# Patient Record
Sex: FEMALE | Race: BLACK OR AFRICAN AMERICAN | NOT HISPANIC OR LATINO | Employment: FULL TIME | URBAN - METROPOLITAN AREA
[De-identification: names, ages, dates, MRNs, and addresses within clinical notes are randomized per-mention and may not be internally consistent; named-entity substitution may affect disease eponyms.]

---

## 2017-05-09 ENCOUNTER — ALLSCRIPTS OFFICE VISIT (OUTPATIENT)
Dept: OTHER | Facility: OTHER | Age: 37
End: 2017-05-09

## 2017-08-31 ENCOUNTER — HOSPITAL ENCOUNTER (EMERGENCY)
Facility: HOSPITAL | Age: 37
Discharge: HOME | End: 2017-08-31
Attending: EMERGENCY MEDICINE | Admitting: EMERGENCY MEDICINE
Payer: COMMERCIAL

## 2017-08-31 VITALS
HEART RATE: 85 BPM | RESPIRATION RATE: 18 BRPM | TEMPERATURE: 99.3 F | SYSTOLIC BLOOD PRESSURE: 118 MMHG | DIASTOLIC BLOOD PRESSURE: 65 MMHG | OXYGEN SATURATION: 100 %

## 2017-08-31 DIAGNOSIS — M54.41 ACUTE RIGHT-SIDED LOW BACK PAIN WITH RIGHT-SIDED SCIATICA: Primary | ICD-10-CM

## 2017-08-31 PROCEDURE — 99283 EMERGENCY DEPT VISIT LOW MDM: CPT

## 2017-08-31 RX ORDER — CYCLOBENZAPRINE HCL 10 MG
10 TABLET ORAL 3 TIMES DAILY PRN
Qty: 20 TABLET | Refills: 0 | Status: SHIPPED | OUTPATIENT
Start: 2017-08-31 | End: 2019-03-15 | Stop reason: ALTCHOICE

## 2017-08-31 RX ORDER — NAPROXEN 500 MG/1
500 TABLET ORAL 2 TIMES DAILY PRN
Qty: 20 TABLET | Refills: 0 | Status: SHIPPED | OUTPATIENT
Start: 2017-08-31 | End: 2019-03-15 | Stop reason: ALTCHOICE

## 2017-09-11 DIAGNOSIS — M54.41 LOW BACK PAIN WITH RIGHT-SIDED SCIATICA: ICD-10-CM

## 2017-09-11 DIAGNOSIS — M54.50 LOW BACK PAIN: ICD-10-CM

## 2017-09-21 ENCOUNTER — ALLSCRIPTS OFFICE VISIT (OUTPATIENT)
Dept: OTHER | Facility: OTHER | Age: 37
End: 2017-09-21

## 2017-10-20 ENCOUNTER — GENERIC CONVERSION - ENCOUNTER (OUTPATIENT)
Dept: OTHER | Facility: OTHER | Age: 37
End: 2017-10-20

## 2018-01-12 NOTE — RESULT NOTES
Message   Stress fracture in her foot based on the MRI   Has she scheduled with the podiatrist?        Verified Results  * MRI FOOT/FOREFOOT TOES LEFT WO CONTRAST [id number] 08:49PM Melissa Smith Order Number: [id number]     Test Name Result Flag Reference   MRI FOOT/FOREFOOT TOES LEFT WO CONTRAST (Report)     This is a summary report  The complete report is available in the patient's medical record  If you cannot access the medical record, please contact the sending organization for a detailed fax or copy  MRI LEFT FOOT     INDICATION: Left foot swelling, generalized left foot pain localized over the lateral dorsal aspect  COMPARISON: Left foot plain films from 1/4/2016  TECHNIQUE: MR sequences were obtained of the left foot including the following: Localizer, sagittal T1/STIR, coronal T1/T2 fat sat, axial T2 fat sat/PD  Images were acquired on a 1 5 Sana unit  Gadolinium was not used     FINDINGS:     SUBCUTANEOUS TISSUES: Normal     BONE MARROW: There is marrow edema along the plantar aspect of the metatarsal head and neck which is probably due to a stress fracture (series 5 images 10 through 12, and series 4 images 10 through 12 )     FIRST MTP JOINT: Intact  SESAMOID BONES: Intact  PLANTAR FASCIA: Intact  LISFRANC LIGAMENT: Intact  FOREFOOT TENDONS: Intact  INTERMETATARSAL REGIONS: There is mild 1st and 2nd interspace intermetatarsal bursitis (series 7 image 18 ) There is no evidence of Campo's neuroma  MUSCULATURE: Intact         IMPRESSION:     There is marrow edema along the plantar aspect of the metatarsal head and neck which is probably due to a stress fracture (series 5 images 10 through 12, and series 4 images 10 through 12 )     There is mild 1st and 2nd interspace intermetatarsal bursitis (series 7 image 18 )       ##sigslh##sigslh       Workstation performed: [id number]     Signed by:   Zaid Jin MD   3/31/16       Signatures   Electronically signed by BERNARDO Miles ; Mar 31 2016  2:19PM EST                       (Author)

## 2018-01-13 VITALS
SYSTOLIC BLOOD PRESSURE: 122 MMHG | HEIGHT: 68 IN | BODY MASS INDEX: 32.89 KG/M2 | HEART RATE: 100 BPM | WEIGHT: 217 LBS | OXYGEN SATURATION: 98 % | DIASTOLIC BLOOD PRESSURE: 80 MMHG

## 2018-01-14 VITALS
OXYGEN SATURATION: 99 % | HEART RATE: 76 BPM | DIASTOLIC BLOOD PRESSURE: 74 MMHG | WEIGHT: 218.2 LBS | HEIGHT: 68 IN | SYSTOLIC BLOOD PRESSURE: 112 MMHG | BODY MASS INDEX: 33.07 KG/M2 | TEMPERATURE: 99.1 F

## 2018-01-14 NOTE — MISCELLANEOUS
Provider Comments  Provider Comments:   Pt was a n/s  LMOM to call back and make new appt        Signatures   Electronically signed by : BERNARDO Cassidy ; Nov 14 2016  4:41PM EST                       (Review)

## 2018-01-16 NOTE — MISCELLANEOUS
Provider Comments  Provider Comments: The patient did not show up for her appointment today  A message was left for her to reschedule        Signatures   Electronically signed by : Murlene Boas, M D ; Apr 8 2016 12:50PM EST                       (Author)

## 2018-01-16 NOTE — PROGRESS NOTES
Assessment    1  Encounter for preventive health examination (V70 0) (Z00 00)   2  Current smoker (305 1) (F17 200)   3  Obesity (BMI 30-39 9) (278 00) (E66 9)   4  Screening, lipid (V77 91) (Z13 220)   5  Screening for diabetes mellitus (V77 1) (Z13 1)   6  Need for prophylactic vaccination or inoculation against diphtheria and tetanus (V06 5)   (Z23)   7  Bipolar disorder (296 80) (F31 9)   8  Bilateral edema of lower extremity (782 3) (R60 0)   9  Cough (786 2) (R05)    Plan  Bilateral edema of lower extremity, Obesity (BMI 30-39 9), Screening for diabetes  mellitus, Screening, lipid    · (1) TSH WITH FT4 REFLEX; Status:Active; Requested for:61Ady2960;   Bipolar disorder    · QUEtiapine Fumarate 25 MG Oral Tablet (SEROquel); TAKE 1 TABLET qhs  Need for prophylactic vaccination or inoculation against diphtheria and tetanus    · Adacel 5-2-15 5 LF-MCG/0 5 Intramuscular Suspension; INJECT 0 5  ML  Intramuscular; To Be Done: 57ZVF0391  Obesity (BMI 30-39 9), Screening for diabetes mellitus, Screening, lipid    · (1) GLUCOSE,  FASTING; Status:Active; Requested for:44Rcf1451;    · (1) LIPID PANEL FASTING W DIRECT LDL REFLEX; Status:Active; Requested  BMU:[id number];     Discussion/Summary  health maintenance visit Currently, she eats a poor diet and has an inadequate exercise regimen  cervical cancer screening is current Breast cancer screening: breast cancer screening is not indicated  Colorectal cancer screening: colorectal cancer screening is not indicated  Osteoporosis screening: bone mineral density testing is not indicated  Screening lab work includes glucose, lipid profile and thyroid function testing  The immunizations will be given as outlined in the orders and Declines flu vaccine  Advice and education were given regarding nutrition, aerobic exercise, weight loss and tobacco cessation  Start Seroquel  3-4 week f/u  Form completed for her  If cough does not get better, will CXR     Possible side effects of new medications were reviewed with the patient/guardian today  The patient was counseled regarding instructions for management, impressions  total time of encounter was 30 minutes and >15 minutes was spent counseling  Chief Complaint  Patient presents today for a wellness  History of Present Illness  HM, Adult Female: The patient is being seen for a health maintenance and need form completed to have foster child evaluation  The last health maintenance visit was 1 year(s) ago  Social History: Household members include foster child, 3 biological children  Work status: working full time  General Health: The patient's health since the last visit is described as good  She has regular dental visits  She complains of vision problems  Vision care includes no recent eye examination  The patient complains of worsening vision  She denies hearing loss  Immunizations status: not up to date The patient needs the following immunization(s): tetanus vaccine and influenza vaccine  Lifestyle:  She does not have a healthy diet  She has weight concerns  She does not exercise regularly  She uses tobacco  The patient is a current cigarette smoker and >20 pack years  She is ready to quit using tobacco  She consumes alcohol  She reports occasional alcohol use  She denies drug use  Reproductive health: the patient is premenopausal   pregnancy history: G 6P 3, 1, 2, 4  Screening: Cervical cancer screening includes a pap smear performed 2016  Breast cancer screening includes no previous mammogram  She hasn't been previously screened for colorectal cancer  Metabolic screening includes uncertain timing of her last lipid profile, glucose screening performed 7468 and uncertain timing of her last thyroid function test      Cardiovascular risk factors: stress, obesity and tobacco use, but no hypertension and no diabetes        Review of Systems    Constitutional: no fever, no recent weight gain, no chills and no recent weight loss    Eyes: eyesight problems  ENT: nasal discharge, but no sore throat  Cardiovascular: no chest pain, no palpitations and no lower extremity edema  The patient presents with complaints of constant episodes of cough, described as dry (3 weeks, started when she had strep throat)  Gastrointestinal: no abdominal pain, no constipation and no diarrhea  Genitourinary: no dysuria  Musculoskeletal: left foot pain at times, known stress fractures and used the boot for a while  Integumentary: no rashes  Neurological: no headache  Psychiatric: sleep disturbances, emotional problems and racing thoughts, mood swings but did not try Seroquel again which she did well on with Lexapro in the past, but not suicidal and no depression  Active Problems    1  Bilateral edema of lower extremity (782 3) (R60 0)   2  Bipolar disorder (296 80) (F31 9)   3  Cervical high risk HPV (human papillomavirus) test positive (795 05) (R87 810)   4  Generalized abdominal pain (789 07) (R10 84)   5  Hemorrhagic cyst of ovary (620 2) (N83 20)   6  Hemorrhoids, external (455 3) (K64 4)   7  Need for prophylactic vaccination and inoculation against influenza (V04 81) (Z23)   8   Pedal edema (782 3) (R60 0)    Past Medical History    · History of Acute foot pain, left (729 5) (M79 672)   · Acute laryngitis (464 00) (J04 0)   · Acute upper respiratory infection (465 9) (J06 9)   · History of Cellulitis (682 9) (L03 90)   · History of Cough (786 2) (R05)   · History of Coughing up blood (786 30) (R04 2)   · History of acute bronchitis (V12 69) (Z87 09)   · History of acute gastritis (V12 70) (Z87 19)   · History of acute pharyngitis (V12 69) (Z87 09)   · History of backache (V13 59) (Z87 39)   · History of left flank pain (V13 89) (T70 555)   · History of sciatica (V12 49) (Z86 69)   · History of streptococcal pharyngitis (V12 09) (Z87 09)   · History of urinary tract infection (V13 02) (Z87 440)   · History of Neck pain (723 1) (M54 2)    Surgical History    · History of Appendectomy   · History of Tubal Ligation    Family History  Family History    · Family history of Depression (311) (F32 9)   · Family history of Diabetes Mellitus (250 00)   · Family history of hypertension (V17 49) (Z82 49)   · Family history of Schizophrenia    Social History    · Being A Social Drinker   · Current smoker (305 1) (F17 200)    Allergies    1  No Known Drug Allergies    2  Seasonal    Vitals   Recorded: 58MHD9924 75:57OH   Systolic 592   Diastolic 88   Heart Rate 218   Respiration 16   Temperature 97 8 F   O2 Saturation 99   Height 5 ft 7 5 in   Weight 219 lb 0 8 oz   BMI Calculated 33 8   BSA Calculated 2 11     Physical Exam    Constitutional   General appearance: No acute distress, well appearing and well nourished  obese  Head and Face   Head and face: Normal     Eyes   Conjunctiva and lids: No swelling, erythema or discharge  Ears, Nose, Mouth, and Throat   Otoscopic examination: Tympanic membranes translucent with normal light reflex  Canals patent without erythema  Oropharynx: Normal with no erythema, edema, exudate or lesions  Neck   Neck: Supple, symmetric, trachea midline, no masses  Thyroid: Normal, no thyromegaly  Pulmonary   Respiratory effort: No increased work of breathing or signs of respiratory distress  Auscultation of lungs: Clear to auscultation  Cardiovascular   Auscultation of heart: Normal rate and rhythm, normal S1 and S2, no murmurs  Examination of extremities for edema and/or varicosities: Abnormal   bilateral ankle tr+ pitting edema and bilateral pretibial tr+ pitting edema, but pitting edema present  Abdomen   Abdomen: Non-tender, no masses  Lymphatic   Palpation of lymph nodes in neck: No lymphadenopathy      Musculoskeletal   Gait and station: Normal     Psychiatric   Orientation to person, place, and time: Normal     Mood and affect: Normal        Signatures   Electronically signed by : Doug Hunt BERNARDO Powell ; Sep 23 2016  9:18AM EST                       (Author)

## 2018-01-17 NOTE — MISCELLANEOUS
Provider Comments  Provider Comments:   Pt was a NOS  LMOM to call back and make a new appt        Signatures   Electronically signed by : BERNARDO Chakraborty ; Oct 27 2017 10:13AM EST                       (Review)

## 2018-06-01 ENCOUNTER — APPOINTMENT (EMERGENCY)
Dept: CT IMAGING | Facility: HOSPITAL | Age: 38
End: 2018-06-01
Payer: COMMERCIAL

## 2018-06-01 ENCOUNTER — HOSPITAL ENCOUNTER (EMERGENCY)
Facility: HOSPITAL | Age: 38
Discharge: HOME | End: 2018-06-01
Attending: EMERGENCY MEDICINE | Admitting: EMERGENCY MEDICINE
Payer: COMMERCIAL

## 2018-06-01 ENCOUNTER — TELEPHONE (OUTPATIENT)
Dept: INTERNAL MEDICINE CLINIC | Facility: CLINIC | Age: 38
End: 2018-06-01

## 2018-06-01 VITALS
RESPIRATION RATE: 20 BRPM | HEART RATE: 64 BPM | OXYGEN SATURATION: 100 % | DIASTOLIC BLOOD PRESSURE: 61 MMHG | BODY MASS INDEX: 33.58 KG/M2 | TEMPERATURE: 98.4 F | WEIGHT: 217.59 LBS | SYSTOLIC BLOOD PRESSURE: 117 MMHG

## 2018-06-01 DIAGNOSIS — R51.9 HEADACHE: Primary | ICD-10-CM

## 2018-06-01 PROCEDURE — 99284 EMERGENCY DEPT VISIT MOD MDM: CPT

## 2018-06-01 PROCEDURE — 96365 THER/PROPH/DIAG IV INF INIT: CPT

## 2018-06-01 PROCEDURE — 96375 TX/PRO/DX INJ NEW DRUG ADDON: CPT

## 2018-06-01 PROCEDURE — 70450 CT HEAD/BRAIN W/O DYE: CPT

## 2018-06-01 RX ORDER — METOCLOPRAMIDE HYDROCHLORIDE 5 MG/ML
10 INJECTION INTRAMUSCULAR; INTRAVENOUS ONCE
Status: COMPLETED | OUTPATIENT
Start: 2018-06-01 | End: 2018-06-01

## 2018-06-01 RX ORDER — DIPHENHYDRAMINE HYDROCHLORIDE 50 MG/ML
25 INJECTION INTRAMUSCULAR; INTRAVENOUS EVERY 6 HOURS PRN
Status: DISCONTINUED | OUTPATIENT
Start: 2018-06-01 | End: 2018-06-01 | Stop reason: HOSPADM

## 2018-06-01 RX ORDER — KETOROLAC TROMETHAMINE 30 MG/ML
15 INJECTION, SOLUTION INTRAMUSCULAR; INTRAVENOUS ONCE
Status: COMPLETED | OUTPATIENT
Start: 2018-06-01 | End: 2018-06-01

## 2018-06-01 RX ORDER — ACETAMINOPHEN 325 MG/1
975 TABLET ORAL ONCE
Status: COMPLETED | OUTPATIENT
Start: 2018-06-01 | End: 2018-06-01

## 2018-06-01 RX ADMIN — KETOROLAC TROMETHAMINE 15 MG: 30 INJECTION, SOLUTION INTRAMUSCULAR at 16:25

## 2018-06-01 RX ADMIN — VALPROATE SODIUM 1000 MG: 100 INJECTION, SOLUTION INTRAVENOUS at 15:44

## 2018-06-01 RX ADMIN — ACETAMINOPHEN 975 MG: 325 TABLET ORAL at 15:33

## 2018-06-01 RX ADMIN — DIPHENHYDRAMINE HYDROCHLORIDE 25 MG: 50 INJECTION, SOLUTION INTRAMUSCULAR; INTRAVENOUS at 15:35

## 2018-06-01 RX ADMIN — METOCLOPRAMIDE 10 MG: 5 INJECTION, SOLUTION INTRAMUSCULAR; INTRAVENOUS at 15:37

## 2018-06-01 NOTE — ED PROVIDER NOTES
History  Chief Complaint   Patient presents with    Headache     Pt  c/o headache since this morning with dizziness  Pt  states "it feels like my whole head was bashed in with a rock"  Pt  denies hx  of migraines or hypertension  HPI     Patient is a pleasant 42 yof with achi, diffuse headache that started this am  No ataxia or dizziness -  She notes mild lightheadedness with no cp or sob  HA was gradual onset  No f/c/s  No neck stiffness  No focal neurological symptoms  No temporal artery pain/tenderness  No vision changes  Headaches are not increasing in severity or frequency  Not worse in the AM  No head trauma  No dysarthria, nystagmus, dysphagia, diplopia, aphasia, vertigo, ataxia, visions loss, dysmetria  Normal finger to nose, gait, rapid alternating movement,  tandem gait, strength and sensation in bilat upper and lower extremities  Normal upper and lower reflexes  No pronator drift  Normal heel to shin  EOMI, no visual field defects  CN 2-13 intact  GCS 15  AOx3  Normal babinski  MDM well appearing 42, yof, will treat for headache  Prior to Admission Medications   Prescriptions Last Dose Informant Patient Reported? Taking? cyclobenzaprine (FLEXERIL) 10 mg tablet   No No   Sig: Take 1 tablet by mouth 3 (three) times a day as needed for muscle spasms   guaifenesin-codeine (GUAIFENESIN AC) 100-10 MG/5ML liquid   No No   Sig: Take 5 mL by mouth 3 (three) times a day as needed for cough  naproxen (NAPROSYN) 500 mg tablet   No No   Sig: Take 1 tablet by mouth 2 (two) times a day as needed for mild pain      Facility-Administered Medications: None       Past Medical History:   Diagnosis Date    Ovarian cyst        Past Surgical History:   Procedure Laterality Date    APPENDECTOMY      TUBAL LIGATION         History reviewed  No pertinent family history  I have reviewed and agree with the history as documented      Social History   Substance Use Topics    Smoking status: Current Every Day Smoker     Packs/day: 1 00     Types: Cigarettes    Smokeless tobacco: Not on file    Alcohol use Yes      Comment: occasional        Review of Systems   Neurological: Positive for headaches  All other systems reviewed and are negative  Physical Exam  Physical Exam   Constitutional: She is oriented to person, place, and time  She appears well-developed and well-nourished  HENT:   Head: Normocephalic and atraumatic  Right Ear: External ear normal    Left Ear: External ear normal    Eyes: Conjunctivae and EOM are normal    Neck: Normal range of motion  Neck supple  No JVD present  No tracheal deviation present  Cardiovascular: Normal rate, regular rhythm and normal heart sounds  Pulmonary/Chest: Effort normal  No respiratory distress  She has no wheezes  She has no rales  Abdominal: Soft  Bowel sounds are normal  There is no tenderness  There is no rebound and no guarding  Musculoskeletal: She exhibits no edema or tenderness  Neurological: She is alert and oriented to person, place, and time  Skin: Skin is warm and dry  No rash noted  No erythema  Psychiatric: She has a normal mood and affect  Thought content normal    Nursing note and vitals reviewed        Vital Signs  ED Triage Vitals   Temperature Pulse Respirations Blood Pressure SpO2   06/01/18 1501 06/01/18 1457 06/01/18 1457 06/01/18 1501 06/01/18 1457   98 4 °F (36 9 °C) 72 20 151/77 100 %      Temp Source Heart Rate Source Patient Position - Orthostatic VS BP Location FiO2 (%)   06/01/18 1501 -- -- -- --   Oral          Pain Score       06/01/18 1457       Worst Possible Pain           Vitals:    06/01/18 1457 06/01/18 1501 06/01/18 1630   BP:  151/77 117/61   Pulse: 72  64       Visual Acuity      ED Medications  Medications   valproate (DEPACON) 1,000 mg in sodium chloride 0 9 % 50 mL for headache (0 g Intravenous Stopped 6/1/18 1604)   metoclopramide (REGLAN) injection 10 mg (10 mg Intravenous Given 6/1/18 1537) acetaminophen (TYLENOL) tablet 975 mg (975 mg Oral Given 6/1/18 1533)   ketorolac (TORADOL) injection 15 mg (15 mg Intravenous Given 6/1/18 1625)       Diagnostic Studies  Results Reviewed     None                 CT head wo contrast   Final Result by Bita Lopez MD (06/01 1547)      No acute intracranial abnormality  Workstation performed: [id number]                    Procedures  Procedures       Phone Contacts  ED Phone Contact    ED Course                               MDM  CritCare Time    Disposition  Final diagnoses:   Headache     Time reflects when diagnosis was documented in both MDM as applicable and the Disposition within this note     Time User Action Codes Description Comment    6/1/2018  4:30 PM Ramona Deleon, [address] [R51] Headache       ED Disposition     ED Disposition Condition Comment    Discharge  Savanna Dennis discharge to home/self care  Condition at discharge: Good        Follow-up Information     Follow up With Specialties Details Why Gabbi Glass MD Internal Medicine In 1 day  [address]   [phone]            Discharge Medication List as of 6/1/2018  4:33 PM      CONTINUE these medications which have NOT CHANGED    Details   cyclobenzaprine (FLEXERIL) 10 mg tablet Take 1 tablet by mouth 3 (three) times a day as needed for muscle spasms, Starting Thu 8/31/2017, Print      guaifenesin-codeine (GUAIFENESIN AC) 100-10 MG/5ML liquid Take 5 mL by mouth 3 (three) times a day as needed for cough  , Starting 6/5/2016, Until Discontinued, Print      naproxen (NAPROSYN) 500 mg tablet Take 1 tablet by mouth 2 (two) times a day as needed for mild pain, Starting Thu 8/31/2017, Print           No discharge procedures on file      ED Provider  Electronically Signed by           Nidia Ingram MD  06/02/18 7891

## 2018-06-01 NOTE — DISCHARGE INSTRUCTIONS
Acute Headache   WHAT YOU NEED TO KNOW:   An acute headache is pain or discomfort that starts suddenly and gets worse quickly  You may have an acute headache only when you feel stress or eat certain foods  Other acute headache pain can happen every day, and sometimes several times a day  DISCHARGE INSTRUCTIONS:   Return to the emergency department if:   · You have severe pain  · You have numbness or weakness on one side of your face or body  · You have a headache that occurs after a blow to the head, a fall, or other trauma  · You have a headache, are forgetful or confused, or have trouble speaking  · You have a headache, stiff neck, and a fever  Contact your healthcare provider if:   · You have a constant headache and are vomiting  · You have a headache each day that does not get better, even after treatment  · You have changes in your headaches, or new symptoms that occur when you have a headache  · You have questions or concerns about your condition or care  Medicines: You may need any of the following:  · Prescription pain medicine  may be given  The medicine your healthcare provider recommends will depend on the kind of headaches you have  You will need to take prescription headache medicines as directed to prevent a problem called rebound headache  These headaches happen with regular use of pain relievers for headache disorders  · NSAIDs , such as ibuprofen, help decrease swelling, pain, and fever  This medicine is available with or without a doctor's order  NSAIDs can cause stomach bleeding or kidney problems in certain people  If you take blood thinner medicine, always ask your healthcare provider if NSAIDs are safe for you  Always read the medicine label and follow directions  · Acetaminophen  decreases pain and fever  It is available without a doctor's order  Ask how much to take and how often to take it  Follow directions   Read the labels of all other medicines you are using to see if they also contain acetaminophen, or ask your doctor or pharmacist  Acetaminophen can cause liver damage if not taken correctly  Do not use more than 3 grams (3,000 milligrams) total of acetaminophen in one day  · Antidepressants  may be given for some kinds of headaches  · Take your medicine as directed  Contact your healthcare provider if you think your medicine is not helping or if you have side effects  Tell him or her if you are allergic to any medicine  Keep a list of the medicines, vitamins, and herbs you take  Include the amounts, and when and why you take them  Bring the list or the pill bottles to follow-up visits  Carry your medicine list with you in case of an emergency  Manage your symptoms:   · Apply heat or ice  on the headache area  Use a heat or ice pack  For an ice pack, you can also put crushed ice in a plastic bag  Cover the pack or bag with a towel before you apply it to your skin  Ice and heat both help decrease pain, and heat also helps decrease muscle spasms  Apply heat for 20 to 30 minutes every 2 hours  Apply ice for 15 to 20 minutes every hour  Apply heat or ice for as long and for as many days as directed  You may alternate heat and ice  · Relax your muscles  Lie down in a comfortable position and close your eyes  Relax your muscles slowly  Start at your toes and work your way up your body  · Keep a record of your headaches  Write down when your headaches start and stop  Include your symptoms and what you were doing when the headache began  Record what you ate or drank for 24 hours before the headache started  Describe the pain and where it hurts  Keep track of what you did to treat your headache and if it worked  Prevent an acute headache:   · Avoid anything that triggers an acute headache  Examples include exposure to chemicals, going to high altitude, or not getting enough sleep  Create a regular sleep routine   Go to sleep at the same time and wake up at the same time each day  Do not use electronic devices before bedtime  These may trigger a headache or prevent you from sleeping well  · Do not smoke  Nicotine and other chemicals in cigarettes and cigars can trigger an acute headache or make it worse  Ask your healthcare provider for information if you currently smoke and need help to quit  E-cigarettes or smokeless tobacco still contain nicotine  Talk to your healthcare provider before you use these products  · Limit alcohol as directed  Alcohol can trigger an acute headache or make it worse  If you have cluster headaches, do not drink alcohol during an episode  For other types of headaches, ask your healthcare provider if it is safe for you to drink alcohol  Ask how much is safe for you to drink, and how often  · Exercise as directed  Exercise can reduce tension and help with headache pain  Aim for 30 minutes of physical activity on most days of the week  Your healthcare provider can help you create an exercise plan  · Eat a variety of healthy foods  Healthy foods include fruits, vegetables, low-fat dairy products, lean meats, fish, whole grains, and cooked beans  Your healthcare provider or dietitian can help you create meals plans if you need to avoid foods that trigger headaches  Follow up with your healthcare provider as directed:  Bring your headache record with you when you see your healthcare provider  Write down your questions so you remember to ask them during your visits  © 2017 [facility] Information is for End User's use only and may not be sold, redistributed or otherwise used for commercial purposes  All illustrations and images included in CareNotes® are the copyrighted property of A D [employer] M [employer] Inc  or Gage [employer]  The above information is an  only  It is not intended as medical advice for individual conditions or treatments   Talk to your doctor, nurse or pharmacist before following any medical regimen to see if it is safe and effective for you

## 2018-06-01 NOTE — ED NOTES
Pt  Ambulated out of dept , vss, normal gait, no acute distress       Bob Kulkarni RN  06/01/18 9895

## 2018-08-23 ENCOUNTER — HOSPITAL ENCOUNTER (EMERGENCY)
Facility: HOSPITAL | Age: 38
Discharge: HOME | End: 2018-08-23
Attending: EMERGENCY MEDICINE | Admitting: EMERGENCY MEDICINE
Payer: COMMERCIAL

## 2018-08-23 ENCOUNTER — APPOINTMENT (EMERGENCY)
Dept: CT IMAGING | Facility: HOSPITAL | Age: 38
End: 2018-08-23
Payer: COMMERCIAL

## 2018-08-23 VITALS
BODY MASS INDEX: 34.22 KG/M2 | HEART RATE: 62 BPM | RESPIRATION RATE: 16 BRPM | DIASTOLIC BLOOD PRESSURE: 81 MMHG | SYSTOLIC BLOOD PRESSURE: 132 MMHG | OXYGEN SATURATION: 100 % | WEIGHT: 221.78 LBS | TEMPERATURE: 97.9 F

## 2018-08-23 DIAGNOSIS — R10.9 ABDOMINAL PAIN: Primary | ICD-10-CM

## 2018-08-23 LAB
ALBUMIN SERPL BCP-MCNC: 3.4 G/DL (ref 3.5–5)
ALP SERPL-CCNC: 113 U/L (ref 46–116)
ALT SERPL W P-5'-P-CCNC: 29 U/L (ref 12–78)
ANION GAP SERPL CALCULATED.3IONS-SCNC: 3 MMOL/L (ref 4–13)
AST SERPL W P-5'-P-CCNC: 12 U/L (ref 5–45)
BASOPHILS # BLD AUTO: 0.01 THOUSANDS/ΜL (ref 0–0.1)
BASOPHILS NFR BLD AUTO: 0 % (ref 0–1)
BILIRUB SERPL-MCNC: 0.3 MG/DL (ref 0.2–1)
BILIRUB UR QL STRIP: NEGATIVE
BUN SERPL-MCNC: 7 MG/DL (ref 5–25)
CALCIUM SERPL-MCNC: 10 MG/DL (ref 8.3–10.1)
CHLORIDE SERPL-SCNC: 109 MMOL/L (ref 100–108)
CLARITY UR: CLEAR
CO2 SERPL-SCNC: 29 MMOL/L (ref 21–32)
COLOR UR: YELLOW
CREAT SERPL-MCNC: 0.66 MG/DL (ref 0.6–1.3)
EOSINOPHIL # BLD AUTO: 0.13 THOUSAND/ΜL (ref 0–0.61)
EOSINOPHIL NFR BLD AUTO: 3 % (ref 0–6)
ERYTHROCYTE [DISTWIDTH] IN BLOOD BY AUTOMATED COUNT: 15.6 % (ref 11.6–15.1)
EXT PREG TEST URINE: NEGATIVE
GFR SERPL CREATININE-BSD FRML MDRD: 128 ML/MIN/1.73SQ M
GLUCOSE SERPL-MCNC: 91 MG/DL (ref 65–140)
GLUCOSE UR STRIP-MCNC: NEGATIVE MG/DL
HCT VFR BLD AUTO: 39.4 % (ref 34.8–46.1)
HGB BLD-MCNC: 12.8 G/DL (ref 11.5–15.4)
HGB UR QL STRIP.AUTO: NEGATIVE
IMM GRANULOCYTES # BLD AUTO: 0.01 THOUSAND/UL (ref 0–0.2)
IMM GRANULOCYTES NFR BLD AUTO: 0 % (ref 0–2)
KETONES UR STRIP-MCNC: NEGATIVE MG/DL
LEUKOCYTE ESTERASE UR QL STRIP: NEGATIVE
LIPASE SERPL-CCNC: 98 U/L (ref 73–393)
LYMPHOCYTES # BLD AUTO: 1.43 THOUSANDS/ΜL (ref 0.6–4.47)
LYMPHOCYTES NFR BLD AUTO: 34 % (ref 14–44)
MCH RBC QN AUTO: 26.7 PG (ref 26.8–34.3)
MCHC RBC AUTO-ENTMCNC: 32.5 G/DL (ref 31.4–37.4)
MCV RBC AUTO: 82 FL (ref 82–98)
MONOCYTES # BLD AUTO: 0.4 THOUSAND/ΜL (ref 0.17–1.22)
MONOCYTES NFR BLD AUTO: 10 % (ref 4–12)
NEUTROPHILS # BLD AUTO: 2.25 THOUSANDS/ΜL (ref 1.85–7.62)
NEUTS SEG NFR BLD AUTO: 53 % (ref 43–75)
NITRITE UR QL STRIP: NEGATIVE
NRBC BLD AUTO-RTO: 0 /100 WBCS
PH UR STRIP.AUTO: 7.5 [PH] (ref 4.5–8)
PLATELET # BLD AUTO: 168 THOUSANDS/UL (ref 149–390)
PMV BLD AUTO: 11.3 FL (ref 8.9–12.7)
POTASSIUM SERPL-SCNC: 3.7 MMOL/L (ref 3.5–5.3)
PROT SERPL-MCNC: 6.8 G/DL (ref 6.4–8.2)
PROT UR STRIP-MCNC: NEGATIVE MG/DL
RBC # BLD AUTO: 4.79 MILLION/UL (ref 3.81–5.12)
SODIUM SERPL-SCNC: 141 MMOL/L (ref 136–145)
SP GR UR STRIP.AUTO: 1.02 (ref 1–1.03)
UROBILINOGEN UR QL STRIP.AUTO: 1 E.U./DL
WBC # BLD AUTO: 4.23 THOUSAND/UL (ref 4.31–10.16)

## 2018-08-23 PROCEDURE — 81003 URINALYSIS AUTO W/O SCOPE: CPT

## 2018-08-23 PROCEDURE — 36415 COLL VENOUS BLD VENIPUNCTURE: CPT

## 2018-08-23 PROCEDURE — 80053 COMPREHEN METABOLIC PANEL: CPT | Performed by: EMERGENCY MEDICINE

## 2018-08-23 PROCEDURE — 81025 URINE PREGNANCY TEST: CPT | Performed by: EMERGENCY MEDICINE

## 2018-08-23 PROCEDURE — 85025 COMPLETE CBC W/AUTO DIFF WBC: CPT | Performed by: EMERGENCY MEDICINE

## 2018-08-23 PROCEDURE — 74177 CT ABD & PELVIS W/CONTRAST: CPT

## 2018-08-23 PROCEDURE — 96361 HYDRATE IV INFUSION ADD-ON: CPT

## 2018-08-23 PROCEDURE — 99284 EMERGENCY DEPT VISIT MOD MDM: CPT

## 2018-08-23 PROCEDURE — 96374 THER/PROPH/DIAG INJ IV PUSH: CPT

## 2018-08-23 PROCEDURE — 83690 ASSAY OF LIPASE: CPT | Performed by: EMERGENCY MEDICINE

## 2018-08-23 RX ORDER — KETOROLAC TROMETHAMINE 30 MG/ML
30 INJECTION, SOLUTION INTRAMUSCULAR; INTRAVENOUS ONCE
Status: COMPLETED | OUTPATIENT
Start: 2018-08-23 | End: 2018-08-23

## 2018-08-23 RX ORDER — ONDANSETRON 2 MG/ML
4 INJECTION INTRAMUSCULAR; INTRAVENOUS ONCE
Status: DISCONTINUED | OUTPATIENT
Start: 2018-08-23 | End: 2018-08-23 | Stop reason: HOSPADM

## 2018-08-23 RX ADMIN — KETOROLAC TROMETHAMINE 30 MG: 30 INJECTION, SOLUTION INTRAMUSCULAR at 14:23

## 2018-08-23 RX ADMIN — SODIUM CHLORIDE 1000 ML: 0.9 INJECTION, SOLUTION INTRAVENOUS at 13:40

## 2018-08-23 RX ADMIN — IOHEXOL 100 ML: 350 INJECTION, SOLUTION INTRAVENOUS at 14:31

## 2018-08-23 NOTE — DISCHARGE INSTRUCTIONS

## 2018-08-23 NOTE — ED PROVIDER NOTES
History  Chief Complaint   Patient presents with    Abdominal Pain     PT reports "Pain starting on left side three days ago, woke up this morning and now it is on the right side and has gotten worse " PT c/o N/V, denies fevers     Patient presents to the emergency department for evaluation of generalized abdominal pain  She states this began 3 days ago with left-sided pain and has since moved over to include the right  She had 1 episode of vomiting yesterday  No diarrhea  Patient denies trauma fall or injury  Patient denies back pain chest pain sob  Patient denies urinary symptoms or vaginal discharge or bleeding  She denies fever chills or rash  She does have a history of an appendectomy  Prior to Admission Medications   Prescriptions Last Dose Informant Patient Reported? Taking? cyclobenzaprine (FLEXERIL) 10 mg tablet   No No   Sig: Take 1 tablet by mouth 3 (three) times a day as needed for muscle spasms   guaifenesin-codeine (GUAIFENESIN AC) 100-10 MG/5ML liquid   No No   Sig: Take 5 mL by mouth 3 (three) times a day as needed for cough  naproxen (NAPROSYN) 500 mg tablet   No No   Sig: Take 1 tablet by mouth 2 (two) times a day as needed for mild pain      Facility-Administered Medications: None       Past Medical History:   Diagnosis Date    Ovarian cyst        Past Surgical History:   Procedure Laterality Date    APPENDECTOMY      TUBAL LIGATION         Family History   Problem Relation Age of Onset    Depression Family     Diabetes Family     Hypertension Family     Schizophrenia Family      I have reviewed and agree with the history as documented  Social History   Substance Use Topics    Smoking status: Current Every Day Smoker     Packs/day: 1 00     Types: Cigarettes    Smokeless tobacco: Never Used    Alcohol use Yes      Comment: occasional        Review of Systems   Constitutional: Negative    Negative for activity change, appetite change, chills, diaphoresis, fatigue and fever  HENT: Negative  Negative for congestion, sinus pain, sinus pressure, sore throat, trouble swallowing and voice change  Eyes: Negative  Negative for photophobia and visual disturbance  Respiratory: Negative  Negative for chest tightness and shortness of breath  Cardiovascular: Negative  Negative for chest pain, palpitations and leg swelling  Gastrointestinal: Positive for abdominal pain, nausea and vomiting  Negative for abdominal distention, anal bleeding, blood in stool, constipation, diarrhea and rectal pain  Endocrine: Negative  Genitourinary: Negative  Negative for difficulty urinating, dysuria, flank pain, frequency, hematuria, urgency, vaginal bleeding, vaginal discharge and vaginal pain  Musculoskeletal: Negative  Negative for back pain, gait problem, neck pain and neck stiffness  Skin: Negative  Negative for rash and wound  Allergic/Immunologic: Negative  Neurological: Negative  Negative for dizziness, tremors, seizures, syncope, facial asymmetry, speech difficulty, weakness, light-headedness, numbness and headaches  Hematological: Bruises/bleeds easily  Psychiatric/Behavioral: Negative  Negative for confusion, self-injury, sleep disturbance and suicidal ideas  Physical Exam  Physical Exam   Constitutional: She is oriented to person, place, and time  She appears well-developed and well-nourished  Nontoxic appearance  Respiratory distress  Patient appears comfortable sitting upright in the stretcher  HENT:   Head: Normocephalic and atraumatic  Right Ear: External ear normal    Left Ear: External ear normal    Mouth/Throat: Oropharynx is clear and moist    Eyes: Conjunctivae and EOM are normal  Pupils are equal, round, and reactive to light  Neck: Normal range of motion  Neck supple  Cardiovascular: Normal rate, regular rhythm, normal heart sounds and intact distal pulses      Pulmonary/Chest: Effort normal and breath sounds normal  No respiratory distress  She has no wheezes  She has no rales  She exhibits no tenderness  Abdominal: Soft  Bowel sounds are normal  She exhibits no distension and no mass  There is tenderness  There is no rebound and no guarding  No hernia  Mild generalized tenderness palpation  No peritoneal signs  No masses or hernias  Musculoskeletal: Normal range of motion  She exhibits no edema or deformity  Neurological: She is alert and oriented to person, place, and time  She has normal reflexes  She displays normal reflexes  No cranial nerve deficit or sensory deficit  She exhibits normal muscle tone  Coordination normal    Skin: Skin is warm and dry  No erythema  No pallor  Psychiatric: She has a normal mood and affect  Her behavior is normal  Judgment and thought content normal    Nursing note and vitals reviewed        Vital Signs  ED Triage Vitals   Temperature Pulse Respirations Blood Pressure SpO2   08/23/18 1222 08/23/18 1211 08/23/18 1211 08/23/18 1211 08/23/18 1211   97 9 °F (36 6 °C) 73 16 (!) 157/11 100 %      Temp Source Heart Rate Source Patient Position - Orthostatic VS BP Location FiO2 (%)   08/23/18 1222 08/23/18 1211 08/23/18 1211 08/23/18 1211 --   Oral Monitor Sitting Right arm       Pain Score       08/23/18 1423       8           Vitals:    08/23/18 1211 08/23/18 1430   BP: (!) 157/11 132/81   Pulse: 73 62   Patient Position - Orthostatic VS: Sitting        Visual Acuity      ED Medications  Medications   ondansetron (ZOFRAN) injection 4 mg (0 mg Intravenous Hold 8/23/18 1340)   sodium chloride 0 9 % bolus 1,000 mL (0 mL Intravenous Stopped 8/23/18 1529)   ketorolac (TORADOL) injection 30 mg (30 mg Intravenous Given 8/23/18 1423)   iohexol (OMNIPAQUE) 350 MG/ML injection (MULTI-DOSE) 100 mL (100 mL Intravenous Given 8/23/18 1431)       Diagnostic Studies  Results Reviewed     Procedure Component Value Units Date/Time    Comprehensive metabolic panel [[id number]]  (Abnormal) Collected:  08/23/18 1235    Lab Status:  Final result Specimen:  Blood from Arm, Right Updated:  08/23/18 1302     Sodium 141 mmol/L      Potassium 3 7 mmol/L      Chloride 109 (H) mmol/L      CO2 29 mmol/L      Anion Gap 3 (L) mmol/L      BUN 7 mg/dL      Creatinine 0 66 mg/dL      Glucose 91 mg/dL      Calcium 10 0 mg/dL      AST 12 U/L      ALT 29 U/L      Alkaline Phosphatase 113 U/L      Total Protein 6 8 g/dL      Albumin 3 4 (L) g/dL      Total Bilirubin 0 30 mg/dL      eGFR 128 ml/min/1 73sq m     Narrative:         National Kidney Disease Education Program recommendations are as follows:  GFR calculation is accurate only with a steady state creatinine  Chronic Kidney disease less than 60 ml/min/1 73 sq  meters  Kidney failure less than 15 ml/min/1 73 sq  meters      Lipase [[id number]]  (Normal) Collected:  08/23/18 1235    Lab Status:  Final result Specimen:  Blood from Arm, Right Updated:  08/23/18 1302     Lipase 98 u/L     POCT pregnancy, urine [[id number]]  (Normal) Resulted:  08/23/18 1253    Lab Status:  Final result Specimen:  Urine Updated:  08/23/18 1253     EXT PREG TEST UR (Ref: Negative) negative    ED Urine Macroscopic [[id number]] Collected:  08/23/18 1258    Lab Status:  Final result Specimen:  Urine Updated:  08/23/18 1248     Color, UA Yellow     Clarity, UA Clear     pH, UA 7 5     Leukocytes, UA Negative     Nitrite, UA Negative     Protein, UA Negative mg/dl      Glucose, UA Negative mg/dl      Ketones, UA Negative mg/dl      Urobilinogen, UA 1 0 E U /dl      Bilirubin, UA Negative     Blood, UA Negative     Specific Gravity, UA 1 020    Narrative:       CLINITEK RESULT    CBC and differential [[id number]]  (Abnormal) Collected:  08/23/18 1235    Lab Status:  Final result Specimen:  Blood from Arm, Right Updated:  08/23/18 1240     WBC 4 23 (L) Thousand/uL      RBC 4 79 Million/uL      Hemoglobin 12 8 g/dL      Hematocrit 39 4 %      MCV 82 fL      MCH 26 7 (L) pg      MCHC 32 5 g/dL      RDW 15 6 (H) %      MPV 11 3 fL      Platelets 098 Thousands/uL      nRBC 0 /100 WBCs      Neutrophils Relative 53 %      Immat GRANS % 0 %      Lymphocytes Relative 34 %      Monocytes Relative 10 %      Eosinophils Relative 3 %      Basophils Relative 0 %      Neutrophils Absolute 2 25 Thousands/µL      Immature Grans Absolute 0 01 Thousand/uL      Lymphocytes Absolute 1 43 Thousands/µL      Monocytes Absolute 0 40 Thousand/µL      Eosinophils Absolute 0 13 Thousand/µL      Basophils Absolute 0 01 Thousands/µL                  CT abdomen pelvis with contrast   Final Result by Estuardo Roque MD (08/23 1514)      No evidence of acute abnormality in the abdomen or pelvis  Workstation performed: [id number]                    Procedures  Procedures       Phone Contacts  ED Phone Contact    ED Course  ED Course as of Aug 23 1545   Thu Aug 23, 2018   1510 Patient feeling improved post Toradol  CT scan results and disposition pending  1529 Patient with negative CT scan and significant improvement after the Toradol  Discussed signs and symptoms that would require return to the emergency department  Patient comfortable at discharge                                MDM  CritCare Time    Disposition  Final diagnoses:   Abdominal pain     Time reflects when diagnosis was documented in both MDM as applicable and the Disposition within this note     Time User Action Codes Description Comment    8/23/2018  3:35 PM Inocencia Mirza 54 [R10 9] Abdominal pain       ED Disposition     ED Disposition Condition Comment    Discharge  [address] discharge to home/self care  Condition at discharge: Stable        Follow-up Information     Follow up With Specialties Details [facility] physician  Schedule an appointment as soon as possible for a visit            Patient's Medications   Discharge Prescriptions    No medications on file     No discharge procedures on file      ED Provider  Electronically Signed by           Ponce Martinez Lang Campbell MD  08/23/18 1975 Mili Ibarra MD  08/23/18 9107

## 2018-09-24 ENCOUNTER — HOSPITAL ENCOUNTER (EMERGENCY)
Facility: HOSPITAL | Age: 38
End: 2018-09-26
Attending: EMERGENCY MEDICINE | Admitting: EMERGENCY MEDICINE
Payer: COMMERCIAL

## 2018-09-24 DIAGNOSIS — F32.A DEPRESSION: Primary | ICD-10-CM

## 2018-09-24 DIAGNOSIS — R45.851 SUICIDAL THOUGHTS: ICD-10-CM

## 2018-09-24 LAB
AMPHETAMINES SERPL QL SCN: NEGATIVE
BARBITURATES UR QL: NEGATIVE
BENZODIAZ UR QL: NEGATIVE
COCAINE UR QL: NEGATIVE
ETHANOL EXG-MCNC: 0 MG/DL
EXT PREG TEST URINE: NEGATIVE
METHADONE UR QL: NEGATIVE
OPIATES UR QL SCN: NEGATIVE
PCP UR QL: NEGATIVE
THC UR QL: NEGATIVE

## 2018-09-24 PROCEDURE — 81025 URINE PREGNANCY TEST: CPT | Performed by: EMERGENCY MEDICINE

## 2018-09-24 PROCEDURE — 82075 ASSAY OF BREATH ETHANOL: CPT | Performed by: EMERGENCY MEDICINE

## 2018-09-24 PROCEDURE — 80307 DRUG TEST PRSMV CHEM ANLYZR: CPT | Performed by: EMERGENCY MEDICINE

## 2018-09-24 PROCEDURE — 99285 EMERGENCY DEPT VISIT HI MDM: CPT
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
## 2018-09-25 VITALS
SYSTOLIC BLOOD PRESSURE: 120 MMHG | BODY MASS INDEX: 35.04 KG/M2 | RESPIRATION RATE: 16 BRPM | TEMPERATURE: 97.8 F | DIASTOLIC BLOOD PRESSURE: 56 MMHG | WEIGHT: 227.07 LBS | HEART RATE: 70 BPM | OXYGEN SATURATION: 97 %

## 2018-09-25 LAB
BASOPHILS # BLD AUTO: 0.01 THOUSANDS/ΜL (ref 0–0.1)
BASOPHILS NFR BLD AUTO: 0 % (ref 0–1)
EOSINOPHIL # BLD AUTO: 0.14 THOUSAND/ΜL (ref 0–0.61)
EOSINOPHIL NFR BLD AUTO: 4 % (ref 0–6)
ERYTHROCYTE [DISTWIDTH] IN BLOOD BY AUTOMATED COUNT: 15.8 % (ref 11.6–15.1)
HCT VFR BLD AUTO: 39.7 % (ref 34.8–46.1)
HGB BLD-MCNC: 13 G/DL (ref 11.5–15.4)
IMM GRANULOCYTES # BLD AUTO: 0.01 THOUSAND/UL (ref 0–0.2)
IMM GRANULOCYTES NFR BLD AUTO: 0 % (ref 0–2)
LYMPHOCYTES # BLD AUTO: 1.19 THOUSANDS/ΜL (ref 0.6–4.47)
LYMPHOCYTES NFR BLD AUTO: 33 % (ref 14–44)
MCH RBC QN AUTO: 26.9 PG (ref 26.8–34.3)
MCHC RBC AUTO-ENTMCNC: 32.7 G/DL (ref 31.4–37.4)
MCV RBC AUTO: 82 FL (ref 82–98)
MONOCYTES # BLD AUTO: 0.31 THOUSAND/ΜL (ref 0.17–1.22)
MONOCYTES NFR BLD AUTO: 9 % (ref 4–12)
NEUTROPHILS # BLD AUTO: 1.99 THOUSANDS/ΜL (ref 1.85–7.62)
NEUTS SEG NFR BLD AUTO: 54 % (ref 43–75)
NRBC BLD AUTO-RTO: 0 /100 WBCS
PLATELET # BLD AUTO: 176 THOUSANDS/UL (ref 149–390)
PMV BLD AUTO: 11.4 FL (ref 8.9–12.7)
RBC # BLD AUTO: 4.83 MILLION/UL (ref 3.81–5.12)
WBC # BLD AUTO: 3.65 THOUSAND/UL (ref 4.31–10.16)

## 2018-09-25 PROCEDURE — 36415 COLL VENOUS BLD VENIPUNCTURE: CPT | Performed by: EMERGENCY MEDICINE

## 2018-09-25 PROCEDURE — 85025 COMPLETE CBC W/AUTO DIFF WBC: CPT | Performed by: EMERGENCY MEDICINE

## 2018-09-25 RX ORDER — NICOTINE 21 MG/24HR
14 PATCH, TRANSDERMAL 24 HOURS TRANSDERMAL ONCE
Status: DISCONTINUED | OUTPATIENT
Start: 2018-09-25 | End: 2018-09-26 | Stop reason: HOSPADM

## 2018-09-25 RX ADMIN — NICOTINE 14 MG: 14 PATCH TRANSDERMAL at 12:31

## 2018-09-25 NOTE — ED NOTES
Items for personal hygiene care provided to pt  Dinner tray at bedside  Pt calm and cooperative with negative complaints at present time  Will continue to monitor for safety        Maile Rajput RN  09/25/18 1971

## 2018-09-25 NOTE — ED NOTES
CM received call from Stacy Nails at [facility]; patient has been accepted and they are requesting transportation after 3 pm  CM agreed to call Jackie for pre-auth and will relay information to Stacy Nails once received  CM contacted [facility] at [phone] and spoke with Cristian Balderas  CM provided facility contact and clinical information as requested  Insurance Pre-cert information:  Sandeeptrosalio  Spoke with: Vaibhav Brown request approved for IP [address]  Admission 9/25  Approved: 7 days, last covered day Oct 1st    Review due Oct  1st  Approved case #:[id number]  Concurrent reviewer: Constantine Smith , [phone]    [facility]  Medical necessity and facesheet faxed and placed on sticker chart  EMTALA completed  ED provider, RN, facility and patient made aware of transport time  CM offered to contact patient's family and she stated she does not have contact information as it is on her phone, which is dead  Patient's cell phone provided to ED Tech for charging in [address] and will be provided to patient when charged so that she may contact family on her own  CM will continue to follow patient through discharge

## 2018-09-25 NOTE — ED NOTES
Patient is awake and asked if she could change her mind and go home     [address]  09/25/18 5564

## 2018-09-25 NOTE — ED NOTES
Pt sleeping on bed  Light and TV off  Door semi closed  Will continue to monitor        Rafa Hobson  09/25/18 0302

## 2018-09-25 NOTE — ED NOTES
CM received call from [facility] at [facility]; unfortunately 5 discharges cancelled and they are no longer able to accept patient  [facility] will call CM back if something opens up later this afternoon  CM to continue bed search but [facility] states that they will accept tomorrow if patient still in ED  CM called [facility] to ask if patient can still be accepted, as CM received a call earlier from Admissions  At that time, patient no longer needed bed  CM spoke with [facility] at [place] [id number] who stated that they did not have record of patient and CM should refax clinical to [phone]  CM faxed clinical  CM will follow up with [facility] re: admission status  ED provider, RN and patient made aware of bed status  CM will continue to follow
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
## 2018-09-25 NOTE — ED NOTES
Patient resting comfortably on bed with room lights and tv off  No wants or complaints at this time    Will continue to monitor       Alfonso Morales  09/25/18 4287

## 2018-09-25 NOTE — ED NOTES
Pt laying on hospital safety bed in negative distress  Pt calm and cooperative  Pt became tearful when explaining how she has felt "like I just don't want to be here anymore"  Pt admitted to increased stress from work, having 4 children, and being a   Pt denies having a specific plan  Pt denies any complaints at present time except that she doesn't "not want my children back here in this place"  Warm blankets, fresh water provided  Awaiting psych placement  Pt provided verbal understanding of plan of care/treatment plan  Will continue to monitor for safety       Darcy Worthy RN  09/25/18 7042

## 2018-09-25 NOTE — ED NOTES
Additional clothing in a alexandra secret pink and white stripped bag, brought in by daughter     Sohail Henry  09/25/18 6425

## 2018-09-25 NOTE — ED PROVIDER NOTES
Care of pt assumed at 0700  Please see prior notes for full history, physical exam, and MDM up to this point  In brief, pt is a 33 y/o female presenting with SI  She has no complaints on my re-evaluation  Pt accepted at inpatient psychiatric facility  EMTALA signed and pt ready for transfer        Zaid Samson MD  09/25/18 8572

## 2018-09-25 NOTE — ED NOTES
CM called and spoke with Greg Eller at [facility] admissions  The clinical has been received and is next in line for review  Admissions will contact CM with decision shortly  CM will wait to hear from [facility] admissions

## 2018-09-25 NOTE — ED NOTES
PT upset that no notice or education was given that she would be place back secure holding  [facility]  09/24/18 [address]  09/24/18 8996

## 2018-09-25 NOTE — ED NOTES
At this time, patient being reviewed at [facility] for admission  Crisis to follow up on [facility] admission and continue bed search in the event patient not accepted  Crisis also to update insurance company if patient discharges today

## 2018-09-25 NOTE — ED NOTES
Ordered food tray     [facility] [employer] [facility]  09/25/18 0991

## 2018-09-25 NOTE — ED NOTES
CM called [facility] and spoke with Yudy Linares in admissions  They have beds available at this time  CM faxed clinical for review to [phone]  CM to continue to follow patient and will continue bed search

## 2018-09-25 NOTE — ED NOTES
Patient resting comfortably with lights on and tv off  Patients  is at bedside  No wants or complaints at this time  Will continue to monitor       Laura Slaughter  09/25/18 1929

## 2018-09-25 NOTE — ED NOTES
Taking over pt observation  Pt noted to have person phone  Per previous ED Tech and RN pt allowed cell phone  Charge RN made aware of phone situation  Pt currently laying down in bed with no signs of distress  Pt has no concerns or requests at this time  Will continue to monitor for pt safety       Fiona Freedman  09/24/18 8631

## 2018-09-25 NOTE — ED NOTES
Offered pt breakfast  Pt states she wants breakfast, but hasn't mentioned what she wants yet  Told her to let us know when she is ready        Haroon Wick  09/25/18 9881

## 2018-09-25 NOTE — ED NOTES
Pt  at bedside, valuables and keys locked in visitor locker 40       Stacie Steiner RN  09/25/18 2011

## 2018-09-25 NOTE — ED NOTES
Patient requesting food tray and wants a cigarette     Jose Nearing [facility]  09/25/18 9266

## 2018-09-25 NOTE — ED NOTES
Patient in room with lights out and tv off  No wants or complaints at this time  Will continue to monitor       Alissa Winters  09/25/18 5550

## 2018-09-25 NOTE — ED NOTES
Continued bed search efforts:    [facility]-no beds  [facility]-no beds   Jaren Reeder with [facility], they do have beds available at this time  CM faxed [phone] and clinical to [phone] for review  CM will continue bed search efforts

## 2018-09-25 NOTE — ED NOTES
Patient signed a 61 51 81  Crisis worker contacted her [facility] at [phone] and spoke with Mackenzie Damon who instructed that accepting facility must be located and accepting/attending physician, ur contact name, number, fax and facility tax id and address obtained prior to preauthorization for inpatient mental health treatment  She also provided the following list of in network facilities: [facility], [facility], [address], [address], [facility], [place], [facility], [facility]  The following are the results of the bed search: No beds at [facility], [facility], [address] per Julián Vang at [facility] AND [facility], no beds at [address] per [place], no beds at [facility] per Ruth Ann, no beds at [place] per Su Breen, no beds at [facility] per Humza Olguin, no beds at [facility] per [place]   Bed search to resume in am

## 2018-09-25 NOTE — EMTALA/ACUTE CARE TRANSFER
[address] [address] [address]  Dept: [phone]      EMTALA TRANSFER CONSENT    NAME Marcy Osuna                                         1980                              MRN [id number]    I have been informed of my rights regarding examination, treatment, and transfer   by Dr Samantha Hollingsworth MD    Benefits: Specialized equipment and/or services available at the receiving facility (Include comment)________________________ (psychiatric evaluation)    Risks: Increased discomfort during transfer, Possible worsening of condition or death during transfer      Transfer Request   I acknowledge that my medical condition has been evaluated and explained to me by the emergency department physician or other qualified medical person and/or my attending physician who has recommended and offered to me further medical examination and treatment  I understand the Hospital's obligation with respect to the treatment and stabilization of my emergency medical condition  I nevertheless request to be transferred  I release the Hospital, the doctor, and any other persons caring for me from all responsibility or liability for any injury or ill effects that may result from my transfer and agree to accept all responsibility for the consequences of my choice to transfer, rather than receive stabilizing treatment at the Hospital  I understand that because the transfer is my request, my insurance may not provide reimbursement for the services  The Hospital will assist and direct me and my family in how to make arrangements for transfer, but the hospital is not liable for any fees charged by the transport service  In spite of this understanding, I refuse to consent to further medical examination and treatment which has been offered to me, and request transfer to  [address], [address] : Dinah HERNANDEZ   I authorize the performance of emergency medical procedures and treatments upon me in both transit and upon arrival at the receiving facility  Additionally, I authorize the release of any and all medical records to the receiving facility and request they be transported with me, if possible  I authorize the performance of emergency medical procedures and treatments upon me in both transit and upon arrival at the receiving facility  Additionally, I authorize the release of any and all medical records to the receiving facility and request they be transported with me, if possible  I understand that the safest mode of transportation during a medical emergency is an ambulance and that the Hospital advocates the use of this mode of transport  Risks of traveling to the receiving facility by car, including absence of medical control, life sustaining equipment, such as oxygen, and medical personnel has been explained to me and I fully understand them  (RHONDA CORRECT BOX BELOW)  [  ]  I consent to the stated transfer and to be transported by ambulance/helicopter  [  ]  I consent to the stated transfer, but refuse transportation by ambulance and accept full responsibility for my transportation by car  I understand the risks of non-ambulance transfers and I exonerate the Hospital and its staff from any deterioration in my condition that results from this refusal     X___________________________________________    DATE  18  TIME________  Signature of patient or legally responsible individual signing on patient behalf           RELATIONSHIP TO PATIENT_________________________          Provider Certification    NAME Iman Marr                                        [facility] 1980                              MRN [id number]    A medical screening exam was performed on the above named patient  Based on the examination:    Condition Necessitating Transfer The primary encounter diagnosis was Depression   A diagnosis of Suicidal thoughts was also pertinent to this visit  Patient Condition: The patient has been stabilized such that within reasonable medical probability, no material deterioration of the patient condition or the condition of the unborn child(noah) is likely to result from the transfer    Reason for Transfer: Level of Care needed not available at this facility (psychiatric evaluation)    Transfer Requirements: [address] [place], [place]   · Space available and qualified personnel available for treatment as acknowledged by Nir Lee @ [facility]  · Agreed to accept transfer and to provide appropriate medical treatment as acknowledged by       Dr Cathy Apley  · Appropriate medical records of the examination and treatment of the patient are provided at the time of transfer   [address] [facility], [address] _______  · Transfer will be performed by qualified personnel from [address] at 6:45 pm  and appropriate transfer equipment as required, including the use of necessary and appropriate life support measures  Provider Certification: I have examined the patient and explained the following risks and benefits of being transferred/refusing transfer to the patient/family:  General risk, such as traffic hazards, adverse weather conditions, rough terrain or turbulence, possible failure of equipment (including vehicle or aircraft), or consequences of actions of persons outside the control of the transport personnel      Based on these reasonable risks and benefits to the patient and/or the unborn child(noah), and based upon the information available at the time of the patients examination, I certify that the medical benefits reasonably to be expected from the provision of appropriate medical treatments at another medical facility outweigh the increasing risks, if any, to the individuals medical condition, and in the case of labor to the unborn child, from effecting the transfer      X____________________________________________ DATE 09/25/18 TIME_______      ORIGINAL - SEND TO MEDICAL RECORDS   COPY - SEND WITH PATIENT DURING TRANSFER

## 2018-09-25 NOTE — ED NOTES
Went in to speak to patient  Patient was upset that her previous RN did not make her aware that she would be moving back to secure holding  Charge RN made aware        Hermelindo Messina RN  09/25/18 6282

## 2018-09-25 NOTE — ED NOTES
Patient is accepted at [facility]  Patient is accepted by Dr Larry Barraza [facility] is arranged with  [employer] is scheduled for 1:00 am  Patient may go to the floor at upon arrival        Nurse report is to be called to [phone] prior to patient transfer

## 2018-09-26 NOTE — ED NOTES
Patient sleeping with lights and tv off in room  No wants or complaints at this time  Will continue to monitor       Ifeanyi Luo  09/25/18 1569

## 2018-09-26 NOTE — ED NOTES
Patient sleeping in bed with room lights and tv off  No wants or complaints at this time  Will continue to monitor       Rayshawn Vasile  09/25/18 5796

## 2018-09-26 NOTE — ED NOTES
Taking over pt observation  Verbal report received from [address] in bed with no signs of distress  No concerns or requests at this time  Will continue to monitor for pt safety       Patt Quiet  09/25/18 8878

## 2018-09-26 NOTE — ED NOTES
Pt continues to rest comfortably on stretcher w/ lights out  Trying to sleep  Denies any needs or complaints at this time        Marisol Hunt RN  09/25/18 2033

## 2018-09-28 ENCOUNTER — TELEPHONE (OUTPATIENT)
Dept: INTERNAL MEDICINE CLINIC | Facility: CLINIC | Age: 38
End: 2018-09-28

## 2018-09-28 ENCOUNTER — TELEPHONE (OUTPATIENT)
Dept: PSYCHIATRY | Facility: CLINIC | Age: 38
End: 2018-09-28

## 2018-09-28 NOTE — TELEPHONE ENCOUNTER
Behavorial Health Outpatient Intake Questions    Referred by: [address] [facility] with provider before scheduling    Are there any developmental disabilities? No    Does the patient have hearing impairment? No    Does the patient have ICM or CTT? No    Taking injectable psychiatric medications? NoIf yes, patient can not be seen here  Has the patient ever seen or currently see a psychiatrist? Yes If yes who/when? SEEN AT [facility] ED ON 9/24/18 ,9/26/18 ADMITTED TO [facility] THOUGHTS  SEEN DR ZOHRA DE LEÓN, D/C 9/29/18  ADMITTED TO [facility] 10 YRS AGO   Has the patient ever seen or currently see a therapist? Yes If yes who/when? SEEN 1-2 IN 2015     How many visits did the pt have for previous psychiatric treatment?  History    Has the patient served in the [place] [address]? No    If yes, have you had combat services? No    Was the patient activated into federal active duty as a member of the national guard or reserve? No    Minor Child    Who has custody of the child? Is there a custody agreement? If there is a custody agreement remind parent that they must bring a copy to the first appt or they will not be seen  Behavorial Health Outpatient Intake History     Presenting Problem (in patient's words) DEPRESSION,ANXIETY,ADMITTED TO [facility]/26[facility] WITH SUICIDAL THOUGHTS,PATIENT DENIES BEING SUICIDAL AT THIS TIME  Substance Abuse:No concerns of substance abuse are reported  Has the patient been seen here previously, either inpatient or outpatient? No outpatient    If seen as outpatient, what provider(s) did the patient see? N/A    A member of the patient's family has been in therapy here with NO    ACCEPTED as a patient Yes Appointment Date: 11/21/18 @ 10:00AM SIGRID [place]BARRAN    Referred Elsewhere?  No    Primary Care Physician: Aida Cool MD    PCP telephone number: [phone]    SUB: VALERI  INS: [employer]  ID: [id number]     GRP: [id number]

## 2018-09-28 NOTE — TELEPHONE ENCOUNTER
They should give her enough until we see her so please schedule her a visit ASAP if she knows when she will be discharged but yes, I can refill the med while she is waiting to see a psychiatrist

## 2018-10-01 NOTE — TELEPHONE ENCOUNTER
Called patient, put her on hold so I could ask Claude Loh if she needed a TCM  I went back to asker she had hung up  I called her back and she did not answer  I left her a detailed message and asked her to call back   I gave Claude Loh the patient's information in order to schedule a TCM

## 2018-10-02 ENCOUNTER — TELEPHONE (OUTPATIENT)
Dept: PSYCHIATRY | Facility: CLINIC | Age: 38
End: 2018-10-02

## 2018-10-03 NOTE — TELEPHONE ENCOUNTER
Left message for patient to call back  The patient is no longer a TCM   Dr Robinson Cool still want to see her as soon as possible

## 2018-10-05 ENCOUNTER — TELEPHONE (OUTPATIENT)
Dept: PSYCHIATRY | Facility: CLINIC | Age: 38
End: 2018-10-05

## 2018-12-20 ENCOUNTER — OFFICE VISIT (OUTPATIENT)
Dept: INTERNAL MEDICINE CLINIC | Facility: CLINIC | Age: 38
End: 2018-12-20
Payer: COMMERCIAL

## 2018-12-20 VITALS
TEMPERATURE: 98.6 F | BODY MASS INDEX: 34.41 KG/M2 | DIASTOLIC BLOOD PRESSURE: 96 MMHG | SYSTOLIC BLOOD PRESSURE: 150 MMHG | WEIGHT: 223 LBS

## 2018-12-20 DIAGNOSIS — E04.9 ENLARGED THYROID: ICD-10-CM

## 2018-12-20 DIAGNOSIS — R07.0 BURNING SENSATION OF THROAT: ICD-10-CM

## 2018-12-20 DIAGNOSIS — R13.10 DYSPHAGIA, UNSPECIFIED TYPE: Primary | ICD-10-CM

## 2018-12-20 DIAGNOSIS — R22.1 SENSATION OF LUMP IN THROAT: ICD-10-CM

## 2018-12-20 PROCEDURE — 99213 OFFICE O/P EST LOW 20 MIN: CPT | Performed by: NURSE PRACTITIONER

## 2018-12-20 RX ORDER — VALACYCLOVIR HYDROCHLORIDE 500 MG/1
500 TABLET, FILM COATED ORAL
COMMUNITY
Start: 2017-08-25 | End: 2019-03-15 | Stop reason: SDUPTHER

## 2018-12-20 RX ORDER — LORAZEPAM 1 MG/1
1 TABLET ORAL AS NEEDED
Refills: 2 | COMMUNITY
Start: 2018-10-02 | End: 2019-03-29 | Stop reason: ALTCHOICE

## 2018-12-20 RX ORDER — ESCITALOPRAM OXALATE 20 MG/1
20 TABLET ORAL
Refills: 2 | COMMUNITY
Start: 2018-10-01 | End: 2019-03-15 | Stop reason: ALTCHOICE

## 2018-12-20 RX ORDER — OMEPRAZOLE 20 MG/1
20 CAPSULE, DELAYED RELEASE ORAL 2 TIMES DAILY
Qty: 60 CAPSULE | Refills: 0 | Status: SHIPPED | OUTPATIENT
Start: 2018-12-20 | End: 2019-03-15 | Stop reason: ALTCHOICE

## 2018-12-20 RX ORDER — NAPROXEN 375 MG/1
TABLET ORAL 3 TIMES DAILY
COMMUNITY
Start: 2011-07-12 | End: 2019-03-15 | Stop reason: ALTCHOICE

## 2018-12-20 RX ORDER — QUETIAPINE FUMARATE 25 MG/1
1 TABLET, FILM COATED ORAL
COMMUNITY
Start: 2017-09-21 | End: 2019-03-15 | Stop reason: ALTCHOICE

## 2018-12-20 NOTE — PROGRESS NOTES
Assessment/Plan:     Diagnoses and all orders for this visit:    Dysphagia, unspecified type  -     omeprazole (PriLOSEC) 20 mg delayed release capsule; Take 1 capsule (20 mg total) by mouth 2 (two) times a day  -     Ambulatory referral to Gastroenterology; Future    Sensation of lump in throat  -     Ambulatory referral to Gastroenterology; Future    Burning sensation of throat  -     Ambulatory referral to Gastroenterology; Future    Enlarged thyroid  -     US thyroid; Future  -     TSH, 3rd generation with Free T4 reflex; Future    Other orders  -     valACYclovir (VALTREX) 500 mg tablet; Take 500 mg by mouth  -     PROCTOSOL HC 2 5 % rectal cream;   -     LORazepam (ATIVAN) 1 mg tablet; Take 1 mg by mouth as needed  -     QUEtiapine (SEROQUEL) 25 mg tablet; Take 1 tablet by mouth  -     escitalopram (LEXAPRO) 20 mg tablet; Take 20 mg by mouth daily at bedtime  -     naproxen (NAPROSYN) 375 mg tablet; 3 (three) times a day          Subjective:      Patient ID: Broderick Singh is a 34 y o  female  Here for lump in her throat   Started 5 days ago  Hard to eat- able to swallow liquids but worse with solids  Feels like indigestion up in her throat  Burning sensation  Feels like a lump in her throat  Denies any n/v or fever   She has tried pepcid and tums without relief             The following portions of the patient's history were reviewed and updated as appropriate: allergies, current medications, past family history, past medical history, past social history, past surgical history and problem list     Review of Systems   Constitutional: Negative  HENT: Positive for sore throat and trouble swallowing  Negative for congestion and rhinorrhea  Respiratory: Negative  Cardiovascular: Negative  Gastrointestinal: Negative  Genitourinary: Negative  Neurological: Negative            Objective:      /96   Temp 98 6 °F (37 °C)   Wt 101 kg (223 lb)   BMI 34 41 kg/m²          Physical Exam Constitutional: She is oriented to person, place, and time  She appears well-developed and well-nourished  HENT:   Mouth/Throat: Oropharynx is clear and moist  No posterior oropharyngeal edema  Neck: Thyromegaly present  Cardiovascular: Normal rate, regular rhythm and normal heart sounds  Pulmonary/Chest: Effort normal and breath sounds normal    Abdominal: Soft  Bowel sounds are normal  She exhibits no distension  There is no tenderness  Lymphadenopathy:     She has no cervical adenopathy  Neurological: She is alert and oriented to person, place, and time  Psychiatric: She has a normal mood and affect  Her behavior is normal    Vitals reviewed

## 2019-01-02 ENCOUNTER — TELEPHONE (OUTPATIENT)
Dept: INTERNAL MEDICINE CLINIC | Facility: CLINIC | Age: 39
End: 2019-01-02

## 2019-01-02 NOTE — TELEPHONE ENCOUNTER
[facility] called to advise us pt did not show for her US Thyroid and they did reach out to the pt to r/s - FYI

## 2019-03-15 ENCOUNTER — OFFICE VISIT (OUTPATIENT)
Dept: INTERNAL MEDICINE CLINIC | Facility: CLINIC | Age: 39
End: 2019-03-15
Payer: COMMERCIAL

## 2019-03-15 VITALS
WEIGHT: 224 LBS | RESPIRATION RATE: 18 BRPM | HEIGHT: 68 IN | HEART RATE: 71 BPM | OXYGEN SATURATION: 99 % | BODY MASS INDEX: 33.95 KG/M2 | TEMPERATURE: 98.5 F | SYSTOLIC BLOOD PRESSURE: 142 MMHG | DIASTOLIC BLOOD PRESSURE: 82 MMHG

## 2019-03-15 DIAGNOSIS — B00.9 HERPES SIMPLEX TYPE 2 INFECTION: ICD-10-CM

## 2019-03-15 DIAGNOSIS — B37.0 ORAL CANDIDIASIS: Primary | ICD-10-CM

## 2019-03-15 PROCEDURE — 87102 FUNGUS ISOLATION CULTURE: CPT | Performed by: NURSE PRACTITIONER

## 2019-03-15 PROCEDURE — 99213 OFFICE O/P EST LOW 20 MIN: CPT | Performed by: NURSE PRACTITIONER

## 2019-03-15 PROCEDURE — 87106 FUNGI IDENTIFICATION YEAST: CPT | Performed by: NURSE PRACTITIONER

## 2019-03-15 PROCEDURE — 3008F BODY MASS INDEX DOCD: CPT | Performed by: NURSE PRACTITIONER

## 2019-03-15 PROCEDURE — 87252 VIRUS INOCULATION TISSUE: CPT | Performed by: NURSE PRACTITIONER

## 2019-03-15 RX ORDER — CLOTRIMAZOLE 10 MG/1
10 LOZENGE ORAL; TOPICAL
Qty: 70 TABLET | Refills: 0 | Status: CANCELLED | OUTPATIENT
Start: 2019-03-15 | End: 2019-03-29

## 2019-03-15 RX ORDER — VALACYCLOVIR HYDROCHLORIDE 500 MG/1
500 TABLET, FILM COATED ORAL 2 TIMES DAILY
Qty: 14 TABLET | Refills: 0 | Status: SHIPPED | OUTPATIENT
Start: 2019-03-15 | End: 2019-03-29 | Stop reason: ALTCHOICE

## 2019-03-15 NOTE — PROGRESS NOTES
Assessment/Plan:    Suspect candidiasis, fungal and viral culture pending  Try nystatin swish and spit  Due to history of herpes, try course of valtrex  If persists, suggest evaluation by oral surgeon or dentist       Diagnoses and all orders for this visit:    Oral candidiasis  -     nystatin (MYCOSTATIN) 100,000 units/mL suspension; Apply 5 mL (500,000 Units total) to the mouth or throat 4 (four) times a day  -     Fungal culture; Future  -     Fungal culture    Herpes simplex type 2 infection  -     valACYclovir (VALTREX) 500 mg tablet; Take 1 tablet (500 mg total) by mouth 2 (two) times a day for 7 days  -     Virus culture; Future  -     Virus culture    Other orders  -     Cancel: PNEUMOCOCCAL POLYSACCHARIDE VACCINE 23-VALENT =>1YO SQ IM  -     Cancel: PREFERRED: influenza vaccine, [id number], quadrivalent, recombinant, PF, 0 5 mL (FLUBLOK)  -     Cancel: clotrimazole (MYCELEX) 10 mg dima; Take 1 tablet (10 mg total) by mouth 5 (five) times a day for 14 days          Subjective:      Patient ID: Shannon Conway is a 34 y o  female  Here for white patches in her mouth  Stared after dental procedure about a month ago  Started around the inside of her lips and tip of her tongue  Feels like its getting worse   Denies any pain but has a weird sensation       The following portions of the patient's history were reviewed and updated as appropriate: allergies, current medications, past family history, past medical history, past social history, past surgical history and problem list     Review of Systems   Constitutional: Negative  Respiratory: Negative  Cardiovascular: Negative  Skin:        White patches in mouth         Objective:      /82   Pulse 71   Temp 98 5 °F (36 9 °C) (Oral)   Resp 18   Ht 5' 8 11" (1 73 m)   Wt 102 kg (224 lb)   SpO2 99%   BMI 33 95 kg/m²          Physical Exam   Constitutional: She is oriented to person, place, and time  She appears well-developed and well-nourished  HENT:   Mouth/Throat:       Cardiovascular: Normal rate, regular rhythm and normal heart sounds  Pulmonary/Chest: Effort normal and breath sounds normal    Neurological: She is alert and oriented to person, place, and time  Vitals reviewed

## 2019-03-20 LAB — FUNGUS SPEC CULT: ABNORMAL

## 2019-03-29 ENCOUNTER — HOSPITAL ENCOUNTER (EMERGENCY)
Facility: HOSPITAL | Age: 39
Discharge: HOME | End: 2019-03-29
Attending: EMERGENCY MEDICINE | Admitting: EMERGENCY MEDICINE
Payer: COMMERCIAL

## 2019-03-29 ENCOUNTER — APPOINTMENT (EMERGENCY)
Dept: RADIOLOGY | Facility: HOSPITAL | Age: 39
End: 2019-03-29
Payer: COMMERCIAL

## 2019-03-29 VITALS
TEMPERATURE: 100 F | SYSTOLIC BLOOD PRESSURE: 179 MMHG | HEART RATE: 91 BPM | OXYGEN SATURATION: 97 % | RESPIRATION RATE: 18 BRPM | DIASTOLIC BLOOD PRESSURE: 71 MMHG

## 2019-03-29 DIAGNOSIS — J20.9 ACUTE BRONCHITIS: Primary | ICD-10-CM

## 2019-03-29 DIAGNOSIS — R03.0 ELEVATED BLOOD PRESSURE READING: ICD-10-CM

## 2019-03-29 PROCEDURE — 71046 X-RAY EXAM CHEST 2 VIEWS: CPT

## 2019-03-29 PROCEDURE — 99283 EMERGENCY DEPT VISIT LOW MDM: CPT

## 2019-03-29 RX ORDER — AZITHROMYCIN 250 MG/1
TABLET, FILM COATED ORAL
Qty: 6 TABLET | Refills: 0 | Status: SHIPPED | OUTPATIENT
Start: 2019-03-29 | End: 2019-03-29 | Stop reason: SDUPTHER

## 2019-03-29 RX ORDER — PROMETHAZINE HYDROCHLORIDE AND CODEINE PHOSPHATE 6.25; 1 MG/5ML; MG/5ML
5 SYRUP ORAL EVERY 4 HOURS PRN
Qty: 120 ML | Refills: 0 | Status: SHIPPED | OUTPATIENT
Start: 2019-03-29 | End: 2019-04-03

## 2019-03-29 RX ORDER — AZITHROMYCIN 250 MG/1
TABLET, FILM COATED ORAL
Qty: 6 TABLET | Refills: 0 | Status: SHIPPED | OUTPATIENT
Start: 2019-03-29 | End: 2019-04-02

## 2019-10-18 ENCOUNTER — OFFICE VISIT (OUTPATIENT)
Dept: INTERNAL MEDICINE CLINIC | Facility: CLINIC | Age: 39
End: 2019-10-18
Payer: COMMERCIAL

## 2019-10-18 VITALS
RESPIRATION RATE: 18 BRPM | SYSTOLIC BLOOD PRESSURE: 122 MMHG | TEMPERATURE: 98.6 F | HEIGHT: 68 IN | HEART RATE: 74 BPM | DIASTOLIC BLOOD PRESSURE: 82 MMHG | OXYGEN SATURATION: 98 % | BODY MASS INDEX: 34.92 KG/M2 | WEIGHT: 230.4 LBS

## 2019-10-18 DIAGNOSIS — E66.09 CLASS 2 OBESITY DUE TO EXCESS CALORIES WITHOUT SERIOUS COMORBIDITY WITH BODY MASS INDEX (BMI) OF 35.0 TO 35.9 IN ADULT: ICD-10-CM

## 2019-10-18 DIAGNOSIS — Z12.39 ENCOUNTER FOR SCREENING BREAST EXAMINATION: ICD-10-CM

## 2019-10-18 DIAGNOSIS — J06.9 UPPER RESPIRATORY TRACT INFECTION, UNSPECIFIED TYPE: Primary | ICD-10-CM

## 2019-10-18 DIAGNOSIS — F41.9 ANXIETY: ICD-10-CM

## 2019-10-18 PROCEDURE — 99213 OFFICE O/P EST LOW 20 MIN: CPT | Performed by: NURSE PRACTITIONER

## 2019-10-18 PROCEDURE — 3008F BODY MASS INDEX DOCD: CPT | Performed by: NURSE PRACTITIONER

## 2019-10-18 RX ORDER — LORAZEPAM 1 MG/1
1 TABLET ORAL
Qty: 30 TABLET | Refills: 0 | Status: SHIPPED | OUTPATIENT
Start: 2019-10-18 | End: 2020-06-03 | Stop reason: ALTCHOICE

## 2019-10-18 RX ORDER — AZITHROMYCIN 250 MG/1
TABLET, FILM COATED ORAL
Qty: 6 TABLET | Refills: 0 | Status: SHIPPED | OUTPATIENT
Start: 2019-10-18 | End: 2019-10-22

## 2019-10-18 RX ORDER — LORAZEPAM 1 MG/1
TABLET ORAL
COMMUNITY
Start: 2018-10-02 | End: 2019-10-18 | Stop reason: SDUPTHER

## 2019-10-18 RX ORDER — LORAZEPAM 1 MG/1
1 TABLET ORAL
Qty: 30 TABLET | Refills: 0
Start: 2019-10-18 | End: 2019-10-18 | Stop reason: SDUPTHER

## 2019-10-18 RX ORDER — VALACYCLOVIR HYDROCHLORIDE 500 MG/1
500 TABLET, FILM COATED ORAL 2 TIMES DAILY
COMMUNITY
End: 2020-06-03 | Stop reason: SDUPTHER

## 2019-10-18 NOTE — PROGRESS NOTES
Assessment/Plan:    Upper respiratory tract infection  Start zpack  Fluids and rest  Call if worsening    Anxiety  Refilled ativan  pmpd checked    Class 2 obesity due to excess calories without serious comorbidity with body mass index (BMI) of 35 0 to 35 9 in adult  BMI Counseling: Body mass index is 34 93 kg/m²  The BMI is above normal  Nutrition recommendations include decreasing overall calorie intake and 3-5 servings of fruits/vegetables daily  Exercise recommendations include exercising 3-5 times per week and joining a gym  Diagnoses and all orders for this visit:    Upper respiratory tract infection, unspecified type  -     azithromycin (ZITHROMAX) 250 mg tablet; Take 2 tablets today then 1 tablet daily x 4 days    Encounter for screening breast examination  -     Mammo screening bilateral w cad; Future    Anxiety  -     Discontinue: LORazepam (ATIVAN) 1 mg tablet; Take 1 tablet (1 mg total) by mouth daily at bedtime  -     LORazepam (ATIVAN) 1 mg tablet; Take 1 tablet (1 mg total) by mouth daily at bedtime    Class 2 obesity due to excess calories without serious comorbidity with body mass index (BMI) of 35 0 to 35 9 in adult    Other orders  -     Discontinue: LORazepam (ATIVAN) 1 mg tablet; TAKE 1 TABLET BY MOUTH AS NEEDED  -     Cancel: influenza vaccine, [id number], quadrivalent, 0 5 mL, preservative-free, for adult and pediatric patients 6 mos+ (AFLURIA, FLUARIX, FLULAVAL, FLUZONE)  -     Cancel: Tdap vaccine greater than or equal to 6yo IM  -     Cancel: Ambulatory referral to Gynecology; Future  -     valACYclovir (VALTREX) 500 mg tablet; Take 500 mg by mouth 2 (two) times a day  -     Cancel: Ambulatory referral to Gynecology; Future          Subjective:      Patient ID: Marlena Mt is a 37 y o  female  Patient complains of fever, cough, sore throat since Tuesday   Fever has gone away but cough is worse  She is prone to bronchitis       The following portions of the patient's history were reviewed and updated as appropriate: allergies, current medications, past family history, past medical history, past social history, past surgical history and problem list     Review of Systems   Constitutional: Positive for fatigue  HENT: Positive for sore throat  Eyes: Negative  Respiratory: Positive for cough  Cardiovascular: Negative  Gastrointestinal: Negative  Musculoskeletal: Negative  Neurological: Negative  Objective:      /82   Pulse 74   Temp 98 6 °F (37 °C) (Tympanic)   Resp 18   Ht 5' 8 1" (1 73 m)   Wt 105 kg (230 lb 6 4 oz)   SpO2 98%   BMI 34 93 kg/m²          Physical Exam   Constitutional: She is oriented to person, place, and time  She appears well-developed and well-nourished  HENT:   Head: Normocephalic and atraumatic  Right Ear: External ear normal    Left Ear: External ear normal    Nose: Nose normal    Mouth/Throat: Oropharynx is clear and moist    Eyes: Pupils are equal, round, and reactive to light  Conjunctivae are normal    Neck: Normal range of motion  Neck supple  Cardiovascular: Normal rate and regular rhythm  Pulmonary/Chest: Effort normal and breath sounds normal    Abdominal: Soft  Bowel sounds are normal    Musculoskeletal: Normal range of motion  Neurological: She is alert and oriented to person, place, and time  Skin: Skin is warm and dry  Nursing note and vitals reviewed

## 2019-10-26 DIAGNOSIS — B00.9 HERPES SIMPLEX TYPE 2 INFECTION: ICD-10-CM

## 2019-10-28 RX ORDER — VALACYCLOVIR HYDROCHLORIDE 500 MG/1
TABLET, FILM COATED ORAL
Qty: 14 TABLET | Refills: 0 | Status: SHIPPED | OUTPATIENT
Start: 2019-10-28 | End: 2020-06-03 | Stop reason: SDUPTHER

## 2019-11-01 PROBLEM — E66.812 CLASS 2 OBESITY DUE TO EXCESS CALORIES WITHOUT SERIOUS COMORBIDITY WITH BODY MASS INDEX (BMI) OF 35.0 TO 35.9 IN ADULT: Status: ACTIVE | Noted: 2019-11-01

## 2019-11-01 PROBLEM — E66.09 CLASS 2 OBESITY DUE TO EXCESS CALORIES WITHOUT SERIOUS COMORBIDITY WITH BODY MASS INDEX (BMI) OF 35.0 TO 35.9 IN ADULT: Status: ACTIVE | Noted: 2019-11-01

## 2019-11-01 NOTE — ASSESSMENT & PLAN NOTE
BMI Counseling: Body mass index is 34 93 kg/m²  The BMI is above normal  Nutrition recommendations include decreasing overall calorie intake and 3-5 servings of fruits/vegetables daily  Exercise recommendations include exercising 3-5 times per week and joining a gym

## 2020-01-23 ENCOUNTER — OFFICE VISIT (OUTPATIENT)
Dept: INTERNAL MEDICINE CLINIC | Facility: CLINIC | Age: 40
End: 2020-01-23
Payer: COMMERCIAL

## 2020-01-23 VITALS
BODY MASS INDEX: 33.19 KG/M2 | RESPIRATION RATE: 16 BRPM | WEIGHT: 219 LBS | TEMPERATURE: 98.4 F | HEIGHT: 68 IN | DIASTOLIC BLOOD PRESSURE: 86 MMHG | OXYGEN SATURATION: 99 % | HEART RATE: 90 BPM | SYSTOLIC BLOOD PRESSURE: 144 MMHG

## 2020-01-23 DIAGNOSIS — J01.00 ACUTE NON-RECURRENT MAXILLARY SINUSITIS: Primary | ICD-10-CM

## 2020-01-23 PROCEDURE — 3008F BODY MASS INDEX DOCD: CPT | Performed by: NURSE PRACTITIONER

## 2020-01-23 PROCEDURE — 99213 OFFICE O/P EST LOW 20 MIN: CPT | Performed by: NURSE PRACTITIONER

## 2020-01-23 RX ORDER — AMOXICILLIN AND CLAVULANATE POTASSIUM 875; 125 MG/1; MG/1
1 TABLET, FILM COATED ORAL EVERY 12 HOURS SCHEDULED
Qty: 14 TABLET | Refills: 0 | Status: SHIPPED | OUTPATIENT
Start: 2020-01-23 | End: 2020-01-30

## 2020-01-23 NOTE — PROGRESS NOTES
Assessment/Plan:    Acute non-recurrent maxillary sinusitis  Start antibiotics    fluids and rest       Diagnoses and all orders for this visit:    Acute non-recurrent maxillary sinusitis  -     amoxicillin-clavulanate (AUGMENTIN) 875-125 mg per tablet; Take 1 tablet by mouth every 12 (twelve) hours for 7 days          Subjective:      Patient ID: Estefanía Ascencio is a 37 y o  female  Started Monday with sinus congestion, pressure, yellow to green mucous  No fever  Headaches  Not improving      The following portions of the patient's history were reviewed and updated as appropriate: allergies, current medications, past family history, past medical history, past social history, past surgical history and problem list     Review of Systems   Constitutional: Negative  HENT: Positive for congestion, postnasal drip, rhinorrhea, sinus pressure and sinus pain  Eyes: Negative  Respiratory: Negative  Cardiovascular: Negative  Gastrointestinal: Negative  Musculoskeletal: Negative  Neurological: Negative  Objective:      /86 (BP Location: Left arm, Patient Position: Sitting, Cuff Size: Large)   Pulse 90   Temp 98 4 °F (36 9 °C)   Resp 16   Ht 5' 8 1" (1 73 m)   Wt 99 3 kg (219 lb)   SpO2 99%   BMI 33 20 kg/m²          Physical Exam   Constitutional: She is oriented to person, place, and time  She appears well-developed and well-nourished  HENT:   Head: Normocephalic and atraumatic  Right Ear: External ear normal    Left Ear: External ear normal    Nose: Nose normal    Mouth/Throat: Oropharynx is clear and moist    Eyes: Pupils are equal, round, and reactive to light  Conjunctivae are normal    Neck: Normal range of motion  Neck supple  Cardiovascular: Normal rate and regular rhythm  Pulmonary/Chest: Effort normal and breath sounds normal    Abdominal: Soft  Bowel sounds are normal    Musculoskeletal: Normal range of motion     Neurological: She is alert and oriented to person, place, and time  Skin: Skin is warm and dry  Nursing note and vitals reviewed

## 2020-01-23 NOTE — LETTER
January 23, 2020     Patient: Gemma Lorraine   YOB: 1980   Date of Visit: 1/23/2020       To Whom it May Concern:    Calvert Councilman is under my professional care  She was seen in my office on 1/23/2020  She may return to work on 1/24/20  If you have any questions or concerns, please don't hesitate to call           Sincerely,          NO Rivera        CC: No Recipients

## 2020-01-27 PROBLEM — J01.00 ACUTE NON-RECURRENT MAXILLARY SINUSITIS: Status: ACTIVE | Noted: 2020-01-27

## 2020-03-23 ENCOUNTER — TELEMEDICINE (OUTPATIENT)
Dept: INTERNAL MEDICINE CLINIC | Facility: CLINIC | Age: 40
End: 2020-03-23
Payer: COMMERCIAL

## 2020-03-23 DIAGNOSIS — R51.9 ACUTE NONINTRACTABLE HEADACHE, UNSPECIFIED HEADACHE TYPE: Primary | ICD-10-CM

## 2020-03-23 PROCEDURE — 99213 OFFICE O/P EST LOW 20 MIN: CPT | Performed by: NURSE PRACTITIONER

## 2020-03-23 RX ORDER — IBUPROFEN 600 MG/1
600 TABLET ORAL EVERY 8 HOURS PRN
Qty: 21 TABLET | Refills: 0 | Status: SHIPPED | OUTPATIENT
Start: 2020-03-23 | End: 2021-02-14

## 2020-03-23 NOTE — PROGRESS NOTES
Virtual Regular Visit    Reason for visit is headache x 3 days    Encounter provider NO Kirkland    Provider located at [id number] [facility] [id number]      Recent Visits  No visits were found meeting these conditions  Showing recent visits within past 7 days and meeting all other requirements     Future Appointments  No visits were found meeting these conditions  Showing future appointments within next 150 days and meeting all other requirements        After connecting through [employer], the patient was identified by name and date of birth  Tammy Tracy was informed that this is a telemedicine visit and that the visit is being conducted through [employer] which may not be secure and therefore, might not be HIPAA-compliant  My office door was closed  No one else was in the room  She acknowledged consent and understanding of privacy and security of the video platform  The patient has agreed to participate and understands they can discontinue the visit at any time  Subjective  Tammy Tracy is a 37 y o  female with a headache all over her head for 3 days  HA is constant and intense  She has not taken any ibuprofen  She has been eating poorly but low sodium  She usually doesn't get headaches  No cough, fever, chills, no travel  Slight sore throat         Past Medical History:   Diagnosis Date    Ovarian cyst     Psychiatric disorder        Past Surgical History:   Procedure Laterality Date    APPENDECTOMY      TUBAL LIGATION         Current Outpatient Medications   Medication Sig Dispense Refill    ibuprofen (MOTRIN) 600 mg tablet Take 1 tablet (600 mg total) by mouth every 8 (eight) hours as needed for headaches 21 tablet 0    LORazepam (ATIVAN) 1 mg tablet Take 1 tablet (1 mg total) by mouth daily at bedtime 30 tablet 0    valACYclovir (VALTREX) 500 mg tablet Take 500 mg by mouth 2 (two) times a day      valACYclovir (VALTREX) 500 mg tablet TAKE 1 TABLET BY MOUTH TWICE A DAY FOR 7 DAYS 14 tablet 0     No current facility-administered medications for this visit  Allergies   Allergen Reactions    Other Allergic Rhinitis     Diagnoses and all orders for this visit:    Acute nonintractable headache, unspecified headache type  -     ibuprofen (MOTRIN) 600 mg tablet; Take 1 tablet (600 mg total) by mouth every 8 (eight) hours as needed for headaches    Try ibuprofen, cut back on salt and processed foods  Drink lots of water  If worsening go to ER or call back   Due to history of borderline hypertension I am hesitant to try maxalt      I spent 15 minutes with the patient during this visit      [id number]

## 2020-06-01 ENCOUNTER — TELEPHONE (OUTPATIENT)
Dept: INTERNAL MEDICINE CLINIC | Facility: CLINIC | Age: 40
End: 2020-06-01

## 2020-06-03 ENCOUNTER — OFFICE VISIT (OUTPATIENT)
Dept: INTERNAL MEDICINE CLINIC | Facility: CLINIC | Age: 40
End: 2020-06-03
Payer: COMMERCIAL

## 2020-06-03 VITALS
WEIGHT: 234 LBS | OXYGEN SATURATION: 100 % | TEMPERATURE: 98.5 F | BODY MASS INDEX: 35.46 KG/M2 | HEIGHT: 68 IN | DIASTOLIC BLOOD PRESSURE: 76 MMHG | SYSTOLIC BLOOD PRESSURE: 114 MMHG | HEART RATE: 82 BPM

## 2020-06-03 DIAGNOSIS — Z12.31 ENCOUNTER FOR SCREENING MAMMOGRAM FOR BREAST CANCER: ICD-10-CM

## 2020-06-03 DIAGNOSIS — Z23 NEED FOR VACCINATION: ICD-10-CM

## 2020-06-03 DIAGNOSIS — Z00.00 WELLNESS EXAMINATION: Primary | ICD-10-CM

## 2020-06-03 DIAGNOSIS — Z13.1 ENCOUNTER FOR SCREENING EXAMINATION FOR IMPAIRED GLUCOSE REGULATION AND DIABETES MELLITUS: ICD-10-CM

## 2020-06-03 DIAGNOSIS — Z13.220 SCREENING, LIPID: ICD-10-CM

## 2020-06-03 DIAGNOSIS — E66.09 CLASS 2 OBESITY DUE TO EXCESS CALORIES WITHOUT SERIOUS COMORBIDITY WITH BODY MASS INDEX (BMI) OF 35.0 TO 35.9 IN ADULT: ICD-10-CM

## 2020-06-03 DIAGNOSIS — Z01.84 IMMUNITY STATUS TESTING: ICD-10-CM

## 2020-06-03 DIAGNOSIS — J30.2 SEASONAL ALLERGIES: ICD-10-CM

## 2020-06-03 DIAGNOSIS — B00.9 HERPES SIMPLEX TYPE 2 INFECTION: ICD-10-CM

## 2020-06-03 DIAGNOSIS — F17.200 CURRENT SMOKER: ICD-10-CM

## 2020-06-03 DIAGNOSIS — Z12.4 PAPANICOLAOU SMEAR FOR CERVICAL CANCER SCREENING: ICD-10-CM

## 2020-06-03 PROBLEM — J01.00 ACUTE NON-RECURRENT MAXILLARY SINUSITIS: Status: RESOLVED | Noted: 2020-01-27 | Resolved: 2020-06-03

## 2020-06-03 PROBLEM — J06.9 UPPER RESPIRATORY TRACT INFECTION: Status: RESOLVED | Noted: 2019-10-18 | Resolved: 2020-06-03

## 2020-06-03 PROCEDURE — 3008F BODY MASS INDEX DOCD: CPT | Performed by: GENERAL ACUTE CARE HOSPITAL

## 2020-06-03 PROCEDURE — 86580 TB INTRADERMAL TEST: CPT

## 2020-06-03 PROCEDURE — 90715 TDAP VACCINE 7 YRS/> IM: CPT

## 2020-06-03 PROCEDURE — 90471 IMMUNIZATION ADMIN: CPT

## 2020-06-03 PROCEDURE — 99396 PREV VISIT EST AGE 40-64: CPT | Performed by: INTERNAL MEDICINE

## 2020-06-03 RX ORDER — BUPROPION HYDROCHLORIDE 150 MG/1
150 TABLET, EXTENDED RELEASE ORAL 2 TIMES DAILY
Qty: 60 TABLET | Refills: 3 | Status: SHIPPED | OUTPATIENT
Start: 2020-06-03 | End: 2021-02-14

## 2020-06-03 RX ORDER — LORATADINE 10 MG/1
10 TABLET ORAL DAILY
Start: 2020-06-03

## 2020-06-03 RX ORDER — VALACYCLOVIR HYDROCHLORIDE 500 MG/1
500 TABLET, FILM COATED ORAL 2 TIMES DAILY
Qty: 60 TABLET | Refills: 3 | Status: SHIPPED | OUTPATIENT
Start: 2020-06-03 | End: 2021-10-11

## 2020-06-05 ENCOUNTER — CLINICAL SUPPORT (OUTPATIENT)
Dept: INTERNAL MEDICINE CLINIC | Facility: CLINIC | Age: 40
End: 2020-06-05

## 2020-06-05 DIAGNOSIS — Z11.1 ENCOUNTER FOR PPD SKIN TEST READING: Primary | ICD-10-CM

## 2020-06-05 LAB
INDURATION: 0 MM
TB SKIN TEST: NEGATIVE

## 2020-08-18 ENCOUNTER — TELEMEDICINE (OUTPATIENT)
Dept: INTERNAL MEDICINE CLINIC | Facility: CLINIC | Age: 40
End: 2020-08-18
Payer: COMMERCIAL

## 2020-08-18 DIAGNOSIS — Z71.9 ENCOUNTER FOR CONSULTATION: Primary | ICD-10-CM

## 2020-08-18 PROCEDURE — 4004F PT TOBACCO SCREEN RCVD TLK: CPT | Performed by: GENERAL ACUTE CARE HOSPITAL

## 2020-08-18 PROCEDURE — 99212 OFFICE O/P EST SF 10 MIN: CPT | Performed by: GENERAL ACUTE CARE HOSPITAL

## 2020-08-18 NOTE — ASSESSMENT & PLAN NOTE
Since the kids are negative for covid, and she doesn't have any symptoms, there is no indication to check covid

## 2020-08-18 NOTE — PROGRESS NOTES
Virtual Regular Visit      Assessment/Plan:    Problem List Items Addressed This Visit        Other    Encounter for consultation - Primary     Since the kids are negative for covid, and she doesn't have any symptoms, there is no indication to check covid  Reason for visit is   Chief Complaint   Patient presents with    Virtual Regular Visit        Encounter provider Elle Mendez MD    Provider located at [address] [place] [id number]      Recent Visits  No visits were found meeting these conditions  Showing recent visits within past 7 days and meeting all other requirements     Today's Visits  Date Type Provider Dept   08/18/20 Telemedicine Elle Mendez MD [facility] today's visits and meeting all other requirements     Future Appointments  No visits were found meeting these conditions  Showing future appointments within next 150 days and meeting all other requirements        The patient was identified by name and date of birth  Gini Kong was informed that this is a telemedicine visit and that the visit is being conducted through [address] [place] [address] and patient was informed that this is not a secure, HIPAA-complaint platform  She agrees to proceed     My office door was closed  No one else was in the room  She acknowledged consent and understanding of privacy and security of the video platform  The patient has agreed to participate and understands they can discontinue the visit at any time  Patient is aware this is a billable service  Subjective  Gini Kong is a 41 y o  female    HPI     Her foster kids were sick, went to test for covid, came back negative today  She doesn't have any symptoms   Wondering if she needs covid test      Past Medical History:   Diagnosis Date    Acute non-recurrent maxillary sinusitis 1/27/2020    Ovarian cyst     Psychiatric disorder     Upper respiratory tract infection 10/18/2019       Past Surgical History:   Procedure Laterality Date    APPENDECTOMY      TUBAL LIGATION         Current Outpatient Medications   Medication Sig Dispense Refill    buPROPion (WELLBUTRIN SR) 150 mg 12 hr tablet Take 1 tablet (150 mg total) by mouth 2 (two) times a day 60 tablet 3    ibuprofen (MOTRIN) 600 mg tablet Take 1 tablet (600 mg total) by mouth every 8 (eight) hours as needed for headaches 21 tablet 0    loratadine (CLARITIN) 10 mg tablet Take 1 tablet (10 mg total) by mouth daily      valACYclovir (VALTREX) 500 mg tablet Take 1 tablet (500 mg total) by mouth 2 (two) times a day for 3 days PRN outbreak 60 tablet 3     No current facility-administered medications for this visit  Allergies   Allergen Reactions    Other Allergic Rhinitis       Review of Systems    Video Exam    There were no vitals filed for this visit  Physical Exam     I spent 5 minutes directly with the patient during this visit      oLrrie Arcos acknowledges that she has consented to an online visit or consultation  She understands that the online visit is based solely on information provided by her, and that, in the absence of a face-to-face physical evaluation by the physician, the diagnosis she receives is both limited and provisional in terms of accuracy and completeness  This is not intended to replace a full medical face-to-face evaluation by the physician  Michael Hemphill understands and accepts these terms

## 2020-09-17 ENCOUNTER — TELEPHONE (OUTPATIENT)
Dept: INTERNAL MEDICINE CLINIC | Facility: CLINIC | Age: 40
End: 2020-09-17

## 2020-09-17 NOTE — TELEPHONE ENCOUNTER
2 step is typically done a week or 2 apart  If her work is ok with it being done 3months apart then it is ok with me   I prefer she get the flu vaccine when she returns for the PPD read and not the same day as the vaccine

## 2020-09-17 NOTE — TELEPHONE ENCOUNTER
The patient needs to have the 2 step PPD done for work  She had the 1st dose in June  Is it ok for her to have the second one and can she have the PPD and the Flu vaccine at the same time  Pleas advise  Thank you

## 2020-09-21 LAB
CHOLEST SERPL-MCNC: 195 MG/DL
CHOLEST/HDLC SERPL: 4.9 (CALC)
GLUCOSE P FAST SERPL-MCNC: 82 MG/DL (ref 65–99)
HDLC SERPL-MCNC: 40 MG/DL
LDLC SERPL CALC-MCNC: 133 MG/DL (CALC)
MEV IGG SER IA-ACNC: 149 AU/ML
MUV IGG SER IA-ACNC: 87.6 AU/ML
NONHDLC SERPL-MCNC: 155 MG/DL (CALC)
RUBV IGG SERPL IA-ACNC: 7.02 INDEX
TRIGL SERPL-MCNC: 111 MG/DL

## 2020-11-20 ENCOUNTER — TELEMEDICINE (OUTPATIENT)
Dept: INTERNAL MEDICINE CLINIC | Facility: CLINIC | Age: 40
End: 2020-11-20
Payer: COMMERCIAL

## 2020-11-20 DIAGNOSIS — Z20.822 EXPOSURE TO COVID-19 VIRUS: ICD-10-CM

## 2020-11-20 DIAGNOSIS — Z20.822 EXPOSURE TO COVID-19 VIRUS: Primary | ICD-10-CM

## 2020-11-20 PROCEDURE — U0003 INFECTIOUS AGENT DETECTION BY NUCLEIC ACID (DNA OR RNA); SEVERE ACUTE RESPIRATORY SYNDROME CORONAVIRUS 2 (SARS-COV-2) (CORONAVIRUS DISEASE [COVID-19]), AMPLIFIED PROBE TECHNIQUE, MAKING USE OF HIGH THROUGHPUT TECHNOLOGIES AS DESCRIBED BY CMS-2020-01-R: HCPCS | Performed by: NURSE PRACTITIONER

## 2020-11-20 PROCEDURE — 99213 OFFICE O/P EST LOW 20 MIN: CPT | Performed by: NURSE PRACTITIONER

## 2020-11-21 LAB — SARS-COV-2 RNA SPEC QL NAA+PROBE: NOT DETECTED

## 2020-11-24 PROBLEM — Z20.822 EXPOSURE TO COVID-19 VIRUS: Status: ACTIVE | Noted: 2020-11-24

## 2021-01-07 ENCOUNTER — OFFICE VISIT (OUTPATIENT)
Dept: INTERNAL MEDICINE CLINIC | Facility: CLINIC | Age: 41
End: 2021-01-07
Payer: COMMERCIAL

## 2021-01-07 VITALS
WEIGHT: 236.7 LBS | HEIGHT: 68 IN | HEART RATE: 79 BPM | OXYGEN SATURATION: 98 % | TEMPERATURE: 96.9 F | BODY MASS INDEX: 35.87 KG/M2 | DIASTOLIC BLOOD PRESSURE: 76 MMHG | SYSTOLIC BLOOD PRESSURE: 120 MMHG

## 2021-01-07 DIAGNOSIS — M54.9 UPPER BACK PAIN ON RIGHT SIDE: Primary | ICD-10-CM

## 2021-01-07 PROBLEM — Z20.822 EXPOSURE TO COVID-19 VIRUS: Status: RESOLVED | Noted: 2020-11-24 | Resolved: 2021-01-07

## 2021-01-07 PROBLEM — Z71.9 ENCOUNTER FOR CONSULTATION: Status: RESOLVED | Noted: 2020-08-18 | Resolved: 2021-01-07

## 2021-01-07 PROCEDURE — 4004F PT TOBACCO SCREEN RCVD TLK: CPT | Performed by: INTERNAL MEDICINE

## 2021-01-07 PROCEDURE — 3008F BODY MASS INDEX DOCD: CPT | Performed by: INTERNAL MEDICINE

## 2021-01-07 PROCEDURE — 99213 OFFICE O/P EST LOW 20 MIN: CPT | Performed by: INTERNAL MEDICINE

## 2021-01-07 RX ORDER — CYCLOBENZAPRINE HCL 10 MG
10 TABLET ORAL 3 TIMES DAILY PRN
Qty: 20 TABLET | Refills: 0 | Status: SHIPPED | OUTPATIENT
Start: 2021-01-07 | End: 2021-02-14

## 2021-01-07 RX ORDER — PREDNISONE 20 MG/1
20 TABLET ORAL 2 TIMES DAILY WITH MEALS
Qty: 10 TABLET | Refills: 0 | Status: SHIPPED | OUTPATIENT
Start: 2021-01-07 | End: 2021-01-12

## 2021-01-07 NOTE — PROGRESS NOTES
Assessment/Plan:    Pain appears muscular  Start muscle relaxant and steroid  May use heating pads and OTC topical cream/patch with lidocaine  Call if not imprpving     Problem List Items Addressed This Visit     None      Visit Diagnoses     Upper back pain on right side    -  Primary    Relevant Medications    cyclobenzaprine (FLEXERIL) 10 mg tablet    predniSONE 20 mg tablet            Subjective:      Patient ID: Alex Benavides is a 41 y o  female  HPI  3 days ago started with pain in the right upper back radiating to the neck and chest  Hurts with movement, can be 7/10  It does not radiate to the arms but raising her right arm triggers the pain  Also has trouble turning her head to the right side  Denies falls, injuries  Denies fever, chills  Taking Tylenol and Ibuprofen without much relief    The following portions of the patient's history were reviewed and updated as appropriate: allergies, current medications, past family history, past medical history, past social history, past surgical history and problem list     Review of Systems   Constitutional: Negative for chills and fever  Musculoskeletal: Positive for back pain  Objective:      /76   Pulse 79   Temp (!) 96 9 °F (36 1 °C)   Ht 5' 8 1" (1 73 m)   Wt 107 kg (236 lb 11 2 oz)   SpO2 98%   BMI 35 88 kg/m²          Physical Exam  Constitutional:       General: She is not in acute distress  Appearance: She is obese  She is not ill-appearing, toxic-appearing or diaphoretic  Pulmonary:      Effort: No respiratory distress  Chest:      Chest wall: No tenderness  Musculoskeletal:      Comments: Tenderness over the right upper upper thoracic paraspinal muscles and over the scapula  Limites lateral rotation of the neck to the right  5/5 both UE   Skin:     Findings: No rash  Neurological:      Mental Status: She is oriented to person, place, and time     Psychiatric:         Mood and Affect: Mood normal          Behavior: Behavior normal

## 2021-01-07 NOTE — LETTER
January 7, 2021     Patient: Marissa Encarnacion   YOB: 1980   Date of Visit: 1/7/2021       To Whom it May Concern:    Edu Friday is under my professional care  She was seen in my office on 1/7/2021  She may return to work on 1/11/2021  If you have any questions or concerns, please don't hesitate to call           Sincerely,          Conner Godinez MD        CC: No Recipients

## 2021-01-25 ENCOUNTER — TELEPHONE (OUTPATIENT)
Dept: INTERNAL MEDICINE CLINIC | Facility: CLINIC | Age: 41
End: 2021-01-25

## 2021-01-25 DIAGNOSIS — Z20.822 EXPOSURE TO COVID-19 VIRUS: Primary | ICD-10-CM

## 2021-01-25 NOTE — TELEPHONE ENCOUNTER
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

## 2021-01-25 NOTE — TELEPHONE ENCOUNTER
Self quarantine so stay home from work  Test 5 days from exposure  Order entered   Call with any symptoms

## 2021-01-28 ENCOUNTER — TELEMEDICINE (OUTPATIENT)
Dept: INTERNAL MEDICINE CLINIC | Facility: CLINIC | Age: 41
End: 2021-01-28
Payer: COMMERCIAL

## 2021-01-28 VITALS — TEMPERATURE: 99.9 F | WEIGHT: 230 LBS | BODY MASS INDEX: 34.87 KG/M2

## 2021-01-28 DIAGNOSIS — Z20.822 EXPOSURE TO COVID-19 VIRUS: ICD-10-CM

## 2021-01-28 DIAGNOSIS — Z20.822 EXPOSURE TO COVID-19 VIRUS: Primary | ICD-10-CM

## 2021-01-28 PROCEDURE — U0003 INFECTIOUS AGENT DETECTION BY NUCLEIC ACID (DNA OR RNA); SEVERE ACUTE RESPIRATORY SYNDROME CORONAVIRUS 2 (SARS-COV-2) (CORONAVIRUS DISEASE [COVID-19]), AMPLIFIED PROBE TECHNIQUE, MAKING USE OF HIGH THROUGHPUT TECHNOLOGIES AS DESCRIBED BY CMS-2020-01-R: HCPCS | Performed by: NURSE PRACTITIONER

## 2021-01-28 PROCEDURE — 99442 PR PHYS/QHP TELEPHONE EVALUATION 11-20 MIN: CPT | Performed by: NURSE PRACTITIONER

## 2021-01-28 PROCEDURE — U0005 INFEC AGEN DETEC AMPLI PROBE: HCPCS | Performed by: NURSE PRACTITIONER

## 2021-01-28 NOTE — PROGRESS NOTES
COVID-19 Virtual Visit     Assessment/Plan:    Problem List Items Addressed This Visit     None      Visit Diagnoses     Exposure to COVID-19 virus    -  Primary    Relevant Orders    Novel Coronavirus (Covid-19),PCR [facility] - Collected at Mobile [facility] or [facility]         Disposition:     I recommended self-quarantine for 10 days and to watch for symptoms until 14 days after exposure  If patient were to develop symptoms, they should self isolate and call our office for further guidance  I referred patient to one of our centralized sites for a COVID-19 swab  I have spent 15 minutes directly with the patient  Greater than 50% of this time was spent in counseling/coordination of care regarding: instructions for management and patient and family education  Encounter provider [facility], [facility]    Provider located at [address] [address] [phone]    Recent Visits  Date Type Provider Dept   01/25/21 Telephone Ronnie Nielsen MD [address] [facility] recent visits within past 7 days and meeting all other requirements     Today's Visits  Date Type Provider Dept   01/28/21 Telemedicine [facility], [address] [place] [address]   Showing today's visits and meeting all other requirements     Future Appointments  No visits were found meeting these conditions  Showing future appointments within next 150 days and meeting all other requirements        Patient agrees to participate in a virtual check in via telephone or video visit instead of presenting to the office to address urgent/immediate medical needs  Patient is aware this is a billable service  After connecting through Telephone, the patient was identified by name and date of birth  Rod Davis was informed that this was a telemedicine visit and that the exam was being conducted confidentially over secure lines  My office door was closed  No one else was in the room   Rod Davis acknowledged consent and understanding of privacy and security of the telemedicine visit  I informed the patient that I have reviewed her record in Epic and presented the opportunity for her to ask any questions regarding the visit today  The patient agreed to participate  It was my intent to perform this visit via video technology but the patient was not able to do a video connection so the visit was completed via audio telephone only  Subjective:   Eric Almaguer is a 41 y o  female who is concerned about COVID-19  Patient's symptoms include fever, fatigue, myalgias and headache  Patient denies chills, malaise, congestion, rhinorrhea, sore throat, anosmia, loss of taste, cough, shortness of breath, chest tightness, abdominal pain, nausea, vomiting and diarrhea       Date of symptom onset: 1/28/2021    Exposure:   Contact with a person who is under investigation (PUI) for or who is positive for COVID-19 within the last 14 days?: Yes    Hospitalized recently for fever and/or lower respiratory symptoms?: No      Currently a healthcare worker 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 (this may include long-term care, correctional and longterm facilities; homeless shelters; assisted-living facilities and group homes ): No      Resident in a special setting where the risk of COVID-19 transmission may be high? (this may include long-term care, correctional and longterm facilities; homeless shelters; assisted-living facilities and group homes ): No      Lab Results   Component Value Date    [address] Not Detected 11/20/2020     Past Medical History:   Diagnosis Date    Acute non-recurrent maxillary sinusitis 1/27/2020    Encounter for consultation 8/18/2020    Exposure to COVID-19 virus 11/24/2020    Ovarian cyst     Psychiatric disorder     Upper respiratory tract infection 10/18/2019     Past Surgical History:   Procedure Laterality Date    APPENDECTOMY      TUBAL LIGATION       Current Outpatient Medications   Medication Sig Dispense Refill    buPROPion (WELLBUTRIN SR) 150 mg 12 hr tablet Take 1 tablet (150 mg total) by mouth 2 (two) times a day (Patient not taking: Reported on 1/28/2021) 60 tablet 3    cyclobenzaprine (FLEXERIL) 10 mg tablet Take 1 tablet (10 mg total) by mouth 3 (three) times a day as needed for muscle spasms (Patient not taking: Reported on 1/28/2021) 20 tablet 0    ibuprofen (MOTRIN) 600 mg tablet Take 1 tablet (600 mg total) by mouth every 8 (eight) hours as needed for headaches (Patient not taking: Reported on 1/28/2021) 21 tablet 0    loratadine (CLARITIN) 10 mg tablet Take 1 tablet (10 mg total) by mouth daily (Patient not taking: Reported on 1/28/2021)      valACYclovir (VALTREX) 500 mg tablet Take 1 tablet (500 mg total) by mouth 2 (two) times a day for 3 days PRN outbreak 60 tablet 3     No current facility-administered medications for this visit  Allergies   Allergen Reactions    Other Allergic Rhinitis       Review of Systems   Constitutional: Positive for fatigue and fever  Negative for chills  HENT: Negative for congestion, rhinorrhea and sore throat  Respiratory: Negative for cough, chest tightness and shortness of breath  Gastrointestinal: Negative for abdominal pain, diarrhea, nausea and vomiting  Musculoskeletal: Positive for myalgias  Neurological: Positive for headaches  Objective:    Vitals:    01/28/21 1540   Temp: 99 9 °F (37 7 °C)   TempSrc: Oral   Weight: 104 kg (230 lb)     VIRTUAL VISIT DISCLAIMER    Zenon Costa acknowledges that she has consented to an online visit or consultation  She understands that the online visit is based solely on information provided by her, and that, in the absence of a face-to-face physical evaluation by the physician, the diagnosis she receives is both limited and provisional in terms of accuracy and completeness   This is not intended to replace a full medical face-to-face evaluation by the physician  Rod Davis understands and accepts these terms

## 2021-01-29 LAB — SARS-COV-2 RNA RESP QL NAA+PROBE: NEGATIVE

## 2021-01-29 NOTE — RESULT ENCOUNTER NOTE
covid-19 negative, but based on her symptoms she should still quarantine and rest and drink a lot of fluids for ten days   Follow up virtual next Monday or tuesday

## 2021-02-01 ENCOUNTER — TELEPHONE (OUTPATIENT)
Dept: ADMINISTRATIVE | Facility: OTHER | Age: 41
End: 2021-02-01

## 2021-02-01 NOTE — TELEPHONE ENCOUNTER
----- Message from Inocente Roe sent at 1/29/2021  1:29 PM EST -----  Regarding: Cervical cancer screening   MAB  01/29/21 1:29 PM    Mya, our patient Alexis Herrera has had Pap Smear (HPV) aka Cervical Cancer Screening completed/performed  Please assist in updating the patient chart by pulling a previous Electronic Medical Record (EMR) document  The previous EMR is Care Everywhere  The date of service is 11/9/2018      Thank you,  Inocente Roe  [facility]

## 2021-02-01 NOTE — TELEPHONE ENCOUNTER
Upon review of the In Basket request we were able to locate, review, and update the patient chart as requested for Pap Smear (HPV) aka Cervical Cancer Screening  Any additional questions or concerns should be emailed to the Practice Liaisons via [email or web address] email, please do not reply via In Basket      Thank you  Max Brewster

## 2021-02-02 ENCOUNTER — TELEMEDICINE (OUTPATIENT)
Dept: INTERNAL MEDICINE CLINIC | Facility: CLINIC | Age: 41
End: 2021-02-02
Payer: COMMERCIAL

## 2021-02-02 DIAGNOSIS — Z20.822 EXPOSURE TO COVID-19 VIRUS: Primary | ICD-10-CM

## 2021-02-02 PROCEDURE — 99442 PR PHYS/QHP TELEPHONE EVALUATION 11-20 MIN: CPT | Performed by: NURSE PRACTITIONER

## 2021-02-02 NOTE — LETTER
February 2, 2021     Patient: Alexis Herrera   YOB: 1980   Date of Visit: 2/2/2021       To Whom it May Concern:    Robert Seip is under my professional care  She was seen in my office on 2/2/2021  She may return to work on 2/8/21 fully duty               Sincerely,          NO Jimenez        CC: No Recipients

## 2021-02-15 NOTE — PROGRESS NOTES
Virtual Brief Visit    Assessment/Plan:    Problem List Items Addressed This Visit        Other    RESOLVED: Exposure to COVID-19 virus - Primary     Patient is feeling a lot better  Continue to drink fluids and rest and monitor symptoms                     Reason for visit is   Chief Complaint   Patient presents with    Virtual Brief Visit        Encounter provider NO Paez    Provider located at [address] [place] [id number]    Recent Visits  No visits were found meeting these conditions  Showing recent visits within past 7 days and meeting all other requirements     Future Appointments  No visits were found meeting these conditions  Showing future appointments within next 150 days and meeting all other requirements        After connecting through telephone, the patient was identified by name and date of birth  Lizeth Alcocer was informed that this is a telemedicine visit and that the visit is being conducted through telephone  My office door was closed  No one else was in the room  She acknowledged consent and understanding of privacy and security of the platform  The patient has agreed to participate and understands she can discontinue the visit at any time  Patient is aware this is a billable service  Subjective    Lizeth Alcocer is a 41 y o  female     Patient is here to follow up on bodyache, fatigue, headache, fever, from last week  covid19 negative  Feeling a lot better       Past Medical History:   Diagnosis Date    Acute non-recurrent maxillary sinusitis 1/27/2020    Encounter for consultation 8/18/2020    Exposure to COVID-19 virus 11/24/2020    Ovarian cyst     Psychiatric disorder     Upper respiratory tract infection 10/18/2019       Past Surgical History:   Procedure Laterality Date    APPENDECTOMY      TUBAL LIGATION         Current Outpatient Medications   Medication Sig Dispense Refill    loratadine (CLARITIN) 10 mg tablet Take 1 tablet (10 mg total) by mouth daily (Patient not taking: Reported on 1/28/2021)      valACYclovir (VALTREX) 500 mg tablet Take 1 tablet (500 mg total) by mouth 2 (two) times a day for 3 days PRN outbreak 60 tablet 3     No current facility-administered medications for this visit  Allergies   Allergen Reactions    Other Allergic Rhinitis       Review of Systems   Constitutional: Negative  HENT: Negative  Eyes: Negative  Respiratory: Negative  Cardiovascular: Negative  Gastrointestinal: Negative  Musculoskeletal: Negative  Neurological: Negative  There were no vitals filed for this visit  I spent 15 minutes directly with the patient during this visit    VIRTUAL VISIT Jordy acknowledges that she has consented to an online visit or consultation  She understands that the online visit is based solely on information provided by her, and that, in the absence of a face-to-face physical evaluation by the physician, the diagnosis she receives is both limited and provisional in terms of accuracy and completeness  This is not intended to replace a full medical face-to-face evaluation by the physician  Pranay Fong understands and accepts these terms

## 2021-04-07 ENCOUNTER — HOSPITAL ENCOUNTER (EMERGENCY)
Facility: HOSPITAL | Age: 41
Discharge: HOME | End: 2021-04-07
Attending: EMERGENCY MEDICINE
Payer: COMMERCIAL

## 2021-04-07 VITALS
SYSTOLIC BLOOD PRESSURE: 162 MMHG | RESPIRATION RATE: 18 BRPM | DIASTOLIC BLOOD PRESSURE: 78 MMHG | HEART RATE: 59 BPM | OXYGEN SATURATION: 97 % | TEMPERATURE: 97.6 F

## 2021-04-07 DIAGNOSIS — K08.89 DENTALGIA: Primary | ICD-10-CM

## 2021-04-07 DIAGNOSIS — M27.3 DRY SOCKET: ICD-10-CM

## 2021-04-07 PROCEDURE — 99284 EMERGENCY DEPT VISIT MOD MDM: CPT | Performed by: PHYSICIAN ASSISTANT

## 2021-04-07 PROCEDURE — 99283 EMERGENCY DEPT VISIT LOW MDM: CPT

## 2021-04-07 RX ORDER — LIDOCAINE HYDROCHLORIDE 20 MG/ML
15 SOLUTION OROPHARYNGEAL 4 TIMES DAILY PRN
Qty: 100 ML | Refills: 0 | Status: SHIPPED | OUTPATIENT
Start: 2021-04-07

## 2021-04-07 RX ORDER — OXYCODONE HYDROCHLORIDE AND ACETAMINOPHEN 5; 325 MG/1; MG/1
1 TABLET ORAL ONCE
Status: COMPLETED | OUTPATIENT
Start: 2021-04-07 | End: 2021-04-07

## 2021-04-07 RX ORDER — LIDOCAINE HYDROCHLORIDE 20 MG/ML
15 SOLUTION OROPHARYNGEAL ONCE
Status: COMPLETED | OUTPATIENT
Start: 2021-04-07 | End: 2021-04-07

## 2021-04-07 RX ORDER — OXYCODONE HYDROCHLORIDE AND ACETAMINOPHEN 5; 325 MG/1; MG/1
1 TABLET ORAL EVERY 4 HOURS PRN
Qty: 6 TABLET | Refills: 0 | Status: SHIPPED | OUTPATIENT
Start: 2021-04-07 | End: 2021-04-17

## 2021-04-07 RX ADMIN — OXYCODONE HYDROCHLORIDE AND ACETAMINOPHEN 1 TABLET: 5; 325 TABLET ORAL at 22:47

## 2021-04-07 RX ADMIN — LIDOCAINE HYDROCHLORIDE 15 ML: 20 SOLUTION ORAL; TOPICAL at 22:49

## 2021-04-07 NOTE — Clinical Note
Helen Alicia was seen and treated in our emergency department on 4/7/2021  Diagnosis:     Carmel Castellanos  may return to work on return date  She may return on this date: 04/09/2021         If you have any questions or concerns, please don't hesitate to call        Aníbal Angel PA-C    ______________________________           _______________          _______________  Hospital Representative                              Date                                Time

## 2021-04-08 NOTE — ED PROVIDER NOTES
tHistory  Chief Complaint   Patient presents with    Dental Pain     pt had a tooth pulled and the pain medication is not helping     Patient is a 40 y/o female presenting to the ED for evaluation of dental pain x1 day  Patient had a lower right molar extracted this morning  Since that time, patient has had worsening pain in the area radiating to her jaw  She was given Tylenol with codeine for pain and took this about 4-5 hours ago with no relief  Patient says she has had teeth extracted in the past that were not nearly as painful  She is able to open her jaw but has increasing pain when doing so  She denies fevers/chills  Prior to Admission Medications   Prescriptions Last Dose Informant Patient Reported? Taking?   loratadine (CLARITIN) 10 mg tablet   No No   Sig: Take 1 tablet (10 mg total) by mouth daily   Patient not taking: Reported on 1/28/2021   valACYclovir (VALTREX) 500 mg tablet   No No   Sig: Take 1 tablet (500 mg total) by mouth 2 (two) times a day for 3 days PRN outbreak      Facility-Administered Medications: None       Past Medical History:   Diagnosis Date    Acute non-recurrent maxillary sinusitis 1/27/2020    Encounter for consultation 8/18/2020    Exposure to COVID-19 virus 11/24/2020    Ovarian cyst     Psychiatric disorder     Upper respiratory tract infection 10/18/2019       Past Surgical History:   Procedure Laterality Date    APPENDECTOMY      TUBAL LIGATION         Family History   Problem Relation Age of Onset    Depression Family     Diabetes Family     Hypertension Family     Schizophrenia Family      I have reviewed and agree with the history as documented      E-Cigarette/Vaping     E-Cigarette/Vaping Substances     Social History     Tobacco Use    Smoking status: Current Every Day Smoker     Packs/day: 1 00     Types: Cigarettes    Smokeless tobacco: Never Used   Substance Use Topics    Alcohol use: Yes     Comment: occasional    Drug use: No       Review of Systems   Constitutional: Negative for appetite change, chills, fatigue and fever  HENT: Positive for dental problem (pain s/p lower right molar extraction)  Negative for congestion, rhinorrhea, sinus pressure, sinus pain and sore throat  Eyes: Negative for photophobia and visual disturbance  Respiratory: Negative for cough, shortness of breath and wheezing  Cardiovascular: Negative for chest pain, palpitations and leg swelling  Gastrointestinal: Negative for abdominal pain, blood in stool, constipation, diarrhea, nausea and vomiting  Genitourinary: Negative for difficulty urinating, dysuria, flank pain, frequency, hematuria and urgency  Musculoskeletal: Negative for arthralgias, back pain, joint swelling, myalgias and neck pain  Skin: Negative for color change, pallor and rash  Neurological: Negative for dizziness, syncope, weakness, light-headedness and headaches  Psychiatric/Behavioral: Negative for confusion and sleep disturbance  All other systems reviewed and are negative  Physical Exam  Physical Exam  Vitals signs and nursing note reviewed  Constitutional:       General: She is awake  Appearance: Normal appearance  She is well-developed  She is not toxic-appearing or diaphoretic  HENT:      Head: Normocephalic and atraumatic  Right Ear: External ear normal       Left Ear: External ear normal       Nose: Nose normal       Mouth/Throat:      Lips: Pink  Mouth: Mucous membranes are moist      Eyes:      General: Lids are normal  No scleral icterus  Conjunctiva/sclera: Conjunctivae normal       Pupils: Pupils are equal, round, and reactive to light  Neck:      Musculoskeletal: Full passive range of motion without pain and neck supple  No injury or pain with movement  Cardiovascular:      Rate and Rhythm: Normal rate and regular rhythm  Pulses: Normal pulses  Radial pulses are 2+ on the right side and 2+ on the left side        Heart sounds: Normal heart sounds, S1 normal and S2 normal    Pulmonary:      Effort: Pulmonary effort is normal  No accessory muscle usage  Breath sounds: Normal breath sounds  No stridor  No decreased breath sounds, wheezing, rhonchi or rales  Abdominal:      General: Abdomen is flat  Bowel sounds are normal  There is no distension  Palpations: Abdomen is soft  Tenderness: There is no abdominal tenderness  There is no right CVA tenderness, left CVA tenderness, guarding or rebound  Musculoskeletal:      Right lower leg: No edema  Left lower leg: No edema  Lymphadenopathy:      Cervical: No cervical adenopathy  Skin:     General: Skin is warm and dry  Capillary Refill: Capillary refill takes less than 2 seconds  Coloration: Skin is not cyanotic, jaundiced or pale  Neurological:      Mental Status: She is alert and oriented to person, place, and time  GCS: GCS eye subscore is 4  GCS verbal subscore is 5  GCS motor subscore is 6  Gait: Gait normal    Psychiatric:         Mood and Affect: Mood normal          Speech: Speech normal          Behavior: Behavior is cooperative           Vital Signs  ED Triage Vitals [04/07/21 2142]   Temperature Pulse Respirations Blood Pressure SpO2   97 6 °F (36 4 °C) 59 18 162/78 97 %      Temp Source Heart Rate Source Patient Position - Orthostatic VS BP Location FiO2 (%)   Oral Monitor Sitting Left arm --      Pain Score       Worst Possible Pain           Vitals:    04/07/21 2142   BP: 162/78   Pulse: 59   Patient Position - Orthostatic VS: Sitting         Visual Acuity      ED Medications  Medications   oxyCODONE-acetaminophen (PERCOCET) 5-325 mg per tablet 1 tablet (1 tablet Oral Given 4/7/21 2247)   Lidocaine Viscous HCl (XYLOCAINE) 2 % mucosal solution 15 mL (15 mL Swish & Spit Given 4/7/21 2249)       Diagnostic Studies  Results Reviewed     None                 No orders to display              Procedures  Procedures         ED Course MDM  Number of Diagnoses or Management Options  Dentalgia:   Dry socket:   Diagnosis management comments: Patient is a 40 y/o female presenting to the ED for evaluation of dental pain after a lower right molar extraction  Pain significantly improved with viscous lidocaine and Percocet  Will send viscous lidocaine and a few percocet to patient's pharmacy but I advised her to follow-up with her dentist as soon as possible  Strict ED return precautions discussed in detail  The management plan was discussed in detail with the patient at bedside and all questions were answered  Prior to discharge, verbal and written instructions provided  The patient verbalized understanding of our discussion and plan of care, and agrees to return to the Emergency Department for concerns and progression of illness  Patient Progress  Patient progress: stable      Disposition  Final diagnoses:   Dentalgia   Dry socket     Time reflects when diagnosis was documented in both MDM as applicable and the Disposition within this note     Time User Action Codes Description Comment    4/7/2021 11:16 PM Bbaita Mendez Add [U17 35] Dentalgia     4/7/2021 11:19 PM Babita Mendez Add [M27 3] Dry socket       ED Disposition     ED Disposition Condition Date/Time Comment    Discharge Stable Wed Apr 7, 2021 11:16 PM Deena Brower discharge to home/self care              Follow-up Information     Follow up With Specialties Details Why Contact Info Additional Prem Richards MD Internal Medicine  As needed [place] [phone]       [facility] Emergency Department Emergency Medicine  If symptoms worsen [address] Emergency Department,  [address], [place], [place], [address]          Discharge Medication List as of 4/7/2021 11:20 PM      START taking these medications    Details   Lidocaine Viscous HCl (XYLOCAINE) 2 % mucosal solution Swish and spit 15 mL 4 (four) times a day as needed for mouth pain or discomfort, Starting Wed 4/7/2021, Normal      oxyCODONE-acetaminophen (PERCOCET) 5-325 mg per tablet Take 1 tablet by mouth every 4 (four) hours as needed for moderate pain for up to 10 daysMax Daily Amount: 6 tablets, Starting Wed 4/7/2021, Until Sat 4/17/2021, Normal         CONTINUE these medications which have NOT CHANGED    Details   loratadine (CLARITIN) 10 mg tablet Take 1 tablet (10 mg total) by mouth daily, Starting Wed 6/3/2020, No Print      valACYclovir (VALTREX) 500 mg tablet Take 1 tablet (500 mg total) by mouth 2 (two) times a day for 3 days PRN outbreak, Starting Wed 6/3/2020, Until Sat 6/6/2020, Normal           No discharge procedures on file      PDMP Review     None          ED Provider  Electronically Signed by           Aye Chowdhury PA-C  04/08/21 4368

## 2021-04-12 ENCOUNTER — IMMUNIZATIONS (OUTPATIENT)
Dept: FAMILY MEDICINE CLINIC | Facility: HOSPITAL | Age: 41
End: 2021-04-12

## 2021-04-12 DIAGNOSIS — Z23 ENCOUNTER FOR IMMUNIZATION: Primary | ICD-10-CM

## 2021-04-12 PROCEDURE — 0001A SARS-COV-2 / COVID-19 MRNA VACCINE (PFIZER-BIONTECH) 30 MCG: CPT

## 2021-04-12 PROCEDURE — 91300 SARS-COV-2 / COVID-19 MRNA VACCINE (PFIZER-BIONTECH) 30 MCG: CPT

## 2021-04-22 ENCOUNTER — TELEMEDICINE (OUTPATIENT)
Dept: INTERNAL MEDICINE CLINIC | Facility: CLINIC | Age: 41
End: 2021-04-22
Payer: COMMERCIAL

## 2021-04-22 VITALS — BODY MASS INDEX: 35.61 KG/M2 | WEIGHT: 235 LBS | TEMPERATURE: 97.4 F | HEIGHT: 68 IN

## 2021-04-22 DIAGNOSIS — J02.9 ACUTE PHARYNGITIS, UNSPECIFIED ETIOLOGY: Primary | ICD-10-CM

## 2021-04-22 PROCEDURE — 99442 PR PHYS/QHP TELEPHONE EVALUATION 11-20 MIN: CPT | Performed by: NURSE PRACTITIONER

## 2021-04-22 RX ORDER — AZITHROMYCIN 250 MG/1
TABLET, FILM COATED ORAL
Qty: 6 TABLET | Refills: 0 | Status: SHIPPED | OUTPATIENT
Start: 2021-04-22 | End: 2021-04-26

## 2021-04-22 NOTE — PROGRESS NOTES
Virtual Brief Visit    Assessment/Plan:    Problem List Items Addressed This Visit        Digestive    Acute pharyngitis - Primary     Start antibiotics  Fluids and rest                     Reason for visit is   Chief Complaint   Patient presents with    Virtual Brief Visit        Encounter provider NO Bacon    Provider located at [address] [address] [id number]    Recent Visits  Date Type Provider Dept   04/22/21 Telemedicine Shadi Bacon recent visits within past 7 days and meeting all other requirements     Future Appointments  No visits were found meeting these conditions  Showing future appointments within next 150 days and meeting all other requirements        After connecting through telephone, the patient was identified by name and date of birth  Silva Lott was informed that this is a telemedicine visit and that the visit is being conducted through telephone  My office door was closed  No one else was in the room  She acknowledged consent and understanding of privacy and security of the platform  The patient has agreed to participate and understands she can discontinue the visit at any time  Patient is aware this is a billable service  Subjective    Silva Lott is a 41 y o  female   Sore Throat   This is a new problem  The current episode started in the past 7 days  The problem has been unchanged  Neither side of throat is experiencing more pain than the other  There has been no fever  The pain is mild  She has tried nothing for the symptoms          Past Medical History:   Diagnosis Date    Acute non-recurrent maxillary sinusitis 1/27/2020    Encounter for consultation 8/18/2020    Exposure to COVID-19 virus 11/24/2020    Ovarian cyst     Psychiatric disorder     Upper respiratory tract infection 10/18/2019       Past Surgical History:   Procedure Laterality Date    APPENDECTOMY  TUBAL LIGATION         Current Outpatient Medications   Medication Sig Dispense Refill    valACYclovir (VALTREX) 500 mg tablet Take 1 tablet (500 mg total) by mouth 2 (two) times a day for 3 days PRN outbreak 60 tablet 3    Lidocaine Viscous HCl (XYLOCAINE) 2 % mucosal solution Swish and spit 15 mL 4 (four) times a day as needed for mouth pain or discomfort (Patient not taking: Reported on 4/22/2021) 100 mL 0    loratadine (CLARITIN) 10 mg tablet Take 1 tablet (10 mg total) by mouth daily (Patient not taking: Reported on 1/28/2021)       No current facility-administered medications for this visit  Allergies   Allergen Reactions    Other Allergic Rhinitis       Review of Systems   Constitutional: Negative  HENT: Positive for sore throat  Eyes: Negative  Respiratory: Negative  Cardiovascular: Negative  Gastrointestinal: Negative  Musculoskeletal: Negative  Neurological: Negative  Vitals:    04/22/21 1559   Temp: (!) 97 4 °F (36 3 °C)   Weight: 107 kg (235 lb)   Height: 5' 8 1" (1 73 m)         I spent 15 minutes directly with the patient during this visit    VIRTUAL VISIT Jordy acknowledges that she has consented to an online visit or consultation  She understands that the online visit is based solely on information provided by her, and that, in the absence of a face-to-face physical evaluation by the physician, the diagnosis she receives is both limited and provisional in terms of accuracy and completeness  This is not intended to replace a full medical face-to-face evaluation by the physician  Key Almaguer understands and accepts these terms

## 2021-04-26 PROBLEM — J02.9 ACUTE PHARYNGITIS: Status: ACTIVE | Noted: 2021-04-26

## 2021-05-06 ENCOUNTER — TRANSCRIBE ORDERS (OUTPATIENT)
Dept: ADMINISTRATIVE | Facility: HOSPITAL | Age: 41
End: 2021-05-06

## 2021-05-06 DIAGNOSIS — Z12.31 ENCOUNTER FOR SCREENING MAMMOGRAM FOR MALIGNANT NEOPLASM OF BREAST: Primary | ICD-10-CM

## 2021-05-08 ENCOUNTER — IMMUNIZATIONS (OUTPATIENT)
Dept: FAMILY MEDICINE CLINIC | Facility: HOSPITAL | Age: 41
End: 2021-05-08

## 2021-05-08 DIAGNOSIS — Z23 ENCOUNTER FOR IMMUNIZATION: Primary | ICD-10-CM

## 2021-05-08 PROCEDURE — 91300 SARS-COV-2 / COVID-19 MRNA VACCINE (PFIZER-BIONTECH) 30 MCG: CPT

## 2021-05-08 PROCEDURE — 0002A SARS-COV-2 / COVID-19 MRNA VACCINE (PFIZER-BIONTECH) 30 MCG: CPT

## 2021-05-12 ENCOUNTER — HOSPITAL ENCOUNTER (OUTPATIENT)
Dept: RADIOLOGY | Age: 41
Discharge: HOME | End: 2021-05-12
Payer: COMMERCIAL

## 2021-05-12 VITALS — WEIGHT: 235 LBS | BODY MASS INDEX: 35.61 KG/M2 | HEIGHT: 68 IN

## 2021-05-12 DIAGNOSIS — Z12.31 ENCOUNTER FOR SCREENING MAMMOGRAM FOR BREAST CANCER: ICD-10-CM

## 2021-05-12 PROCEDURE — 77067 SCR MAMMO BI INCL CAD: CPT

## 2021-05-12 PROCEDURE — 77063 BREAST TOMOSYNTHESIS BI: CPT

## 2021-09-06 ENCOUNTER — HOSPITAL ENCOUNTER (EMERGENCY)
Facility: HOSPITAL | Age: 41
Discharge: HOME | End: 2021-09-06
Attending: EMERGENCY MEDICINE | Admitting: EMERGENCY MEDICINE
Payer: COMMERCIAL

## 2021-09-06 VITALS
HEART RATE: 86 BPM | WEIGHT: 234 LBS | DIASTOLIC BLOOD PRESSURE: 77 MMHG | RESPIRATION RATE: 18 BRPM | TEMPERATURE: 98.5 F | BODY MASS INDEX: 35.58 KG/M2 | OXYGEN SATURATION: 100 % | SYSTOLIC BLOOD PRESSURE: 125 MMHG

## 2021-09-06 DIAGNOSIS — J06.9 VIRAL URI WITH COUGH: Primary | ICD-10-CM

## 2021-09-06 LAB — SARS-COV-2 RNA RESP QL NAA+PROBE: NEGATIVE

## 2021-09-06 PROCEDURE — U0005 INFEC AGEN DETEC AMPLI PROBE: HCPCS | Performed by: PHYSICIAN ASSISTANT

## 2021-09-06 PROCEDURE — U0003 INFECTIOUS AGENT DETECTION BY NUCLEIC ACID (DNA OR RNA); SEVERE ACUTE RESPIRATORY SYNDROME CORONAVIRUS 2 (SARS-COV-2) (CORONAVIRUS DISEASE [COVID-19]), AMPLIFIED PROBE TECHNIQUE, MAKING USE OF HIGH THROUGHPUT TECHNOLOGIES AS DESCRIBED BY CMS-2020-01-R: HCPCS | Performed by: PHYSICIAN ASSISTANT

## 2021-09-06 PROCEDURE — 99283 EMERGENCY DEPT VISIT LOW MDM: CPT

## 2021-09-06 PROCEDURE — 99284 EMERGENCY DEPT VISIT MOD MDM: CPT | Performed by: PHYSICIAN ASSISTANT

## 2021-09-06 RX ORDER — PREDNISONE 20 MG/1
40 TABLET ORAL DAILY
Qty: 10 TABLET | Refills: 0 | Status: SHIPPED | OUTPATIENT
Start: 2021-09-06 | End: 2021-09-11

## 2021-09-06 RX ORDER — BENZONATATE 100 MG/1
100 CAPSULE ORAL EVERY 8 HOURS
Qty: 21 CAPSULE | Refills: 0 | Status: SHIPPED | OUTPATIENT
Start: 2021-09-06

## 2021-09-06 RX ORDER — ALBUTEROL SULFATE 90 UG/1
1-2 AEROSOL, METERED RESPIRATORY (INHALATION) EVERY 6 HOURS PRN
Qty: 8 G | Refills: 0 | Status: SHIPPED | OUTPATIENT
Start: 2021-09-06

## 2021-09-06 NOTE — ED PROVIDER NOTES
History  Chief Complaint   Patient presents with    Cough     PT c/o "cough for the last three days  OTC products have not helped"     The patient is a 43-year-old female with no significant past medical history who presents to the emergency department for evaluation of cough and cold symptoms that she has been experiencing for the last 3 days  She states that she was recently around to of her foster children who had similar symptoms  She denies any known exposure to COVID-19  She states that she has been taking over-the-counter cough medications, however she is not getting any relief  She reports some associated nasal congestion  She reports a history of bronchitis, and she states that generally steroids are very helpful when she is having the symptoms  She denies any associated chest pain or shortness of breath  She does state that it is her to breathe when she is coughing a lot, however it is only associated with coughing  She denies fever, chills, nausea, vomiting or headache, dizziness or lightheadedness  History provided by:  Patient   used: No    Cough  Associated symptoms: no chest pain, no chills, no ear pain, no fever, no headaches, no rash, no shortness of breath and no sore throat        Prior to Admission Medications   Prescriptions Last Dose Informant Patient Reported? Taking?    Lidocaine Viscous HCl (XYLOCAINE) 2 % mucosal solution   No No   Sig: Swish and spit 15 mL 4 (four) times a day as needed for mouth pain or discomfort   Patient not taking: Reported on 4/22/2021   loratadine (CLARITIN) 10 mg tablet   No No   Sig: Take 1 tablet (10 mg total) by mouth daily   Patient not taking: Reported on 1/28/2021   valACYclovir (VALTREX) 500 mg tablet   No No   Sig: Take 1 tablet (500 mg total) by mouth 2 (two) times a day for 3 days PRN outbreak      Facility-Administered Medications: None       Past Medical History:   Diagnosis Date    Acute non-recurrent maxillary sinusitis 1/27/2020    Encounter for consultation 8/18/2020    Exposure to COVID-19 virus 11/24/2020    Ovarian cyst     Psychiatric disorder     Upper respiratory tract infection 10/18/2019       Past Surgical History:   Procedure Laterality Date    APPENDECTOMY      TUBAL LIGATION         Family History   Problem Relation Age of Onset    Depression Family     Diabetes Family     Hypertension Family     Schizophrenia Family     No Known Problems Mother     No Known Problems Father     No Known Problems Sister     No Known Problems Daughter     No Known Problems Maternal Grandmother     No Known Problems Maternal Grandfather     No Known Problems Paternal Grandmother     No Known Problems Paternal Grandfather     No Known Problems Son     No Known Problems Son     No Known Problems Son     No Known Problems Paternal Aunt      I have reviewed and agree with the history as documented  E-Cigarette/Vaping     E-Cigarette/Vaping Substances     Social History     Tobacco Use    Smoking status: Current Every Day Smoker     Packs/day: 1 00     Years: 22 00     Pack years: 22 00     Types: Cigarettes    Smokeless tobacco: Never Used   Substance Use Topics    Alcohol use: Yes     Comment: 1 x a year    Drug use: No       Review of Systems   Constitutional: Negative for chills and fever  HENT: Positive for congestion  Negative for ear pain and sore throat  Eyes: Negative for redness and visual disturbance  Respiratory: Positive for cough  Negative for shortness of breath  Cardiovascular: Negative for chest pain  Gastrointestinal: Negative for abdominal pain, diarrhea, nausea and vomiting  Genitourinary: Negative for dysuria and hematuria  Musculoskeletal: Negative for back pain, neck pain and neck stiffness  Skin: Negative for color change and rash  Neurological: Negative for dizziness, light-headedness and headaches  All other systems reviewed and are negative        Physical Exam  Physical Exam  Vitals and nursing note reviewed  Constitutional:       General: She is not in acute distress  Appearance: She is well-developed  She is not ill-appearing or toxic-appearing  HENT:      Head: Normocephalic and atraumatic  Nose: Mucosal edema and congestion present  Mouth/Throat:      Pharynx: Oropharynx is clear  Uvula midline  No pharyngeal swelling, oropharyngeal exudate, posterior oropharyngeal erythema or uvula swelling  Eyes:      General: Lids are normal       Conjunctiva/sclera: Conjunctivae normal    Cardiovascular:      Rate and Rhythm: Normal rate and regular rhythm  Heart sounds: Normal heart sounds  Pulmonary:      Effort: Pulmonary effort is normal       Breath sounds: Normal breath sounds  Abdominal:      General: There is no distension  Palpations: Abdomen is soft  Tenderness: There is no abdominal tenderness  Musculoskeletal:      Cervical back: Normal range of motion and neck supple  Skin:     General: Skin is warm and dry  Neurological:      Mental Status: She is alert and oriented to person, place, and time  Vital Signs  ED Triage Vitals [09/06/21 1126]   Temperature Pulse Respirations Blood Pressure SpO2   98 5 °F (36 9 °C) 86 18 125/77 100 %      Temp Source Heart Rate Source Patient Position - Orthostatic VS BP Location FiO2 (%)   Oral Monitor Sitting Left arm --      Pain Score       --           Vitals:    09/06/21 1126   BP: 125/77   Pulse: 86   Patient Position - Orthostatic VS: Sitting         Visual Acuity      ED Medications  Medications - No data to display    Diagnostic Studies  Results Reviewed     Procedure Component Value Units Date/Time    Novel Coronavirus (Covid-19),PCR [facility] - 2 Hour Stat [[id number]]  (Normal) Collected: 09/06/21 1215    Lab Status: Final result Specimen: Nares from Nose Updated: 09/06/21 1307     SARS-CoV-2 Negative    Narrative:       The specimen collection materials, transport medium, and/or testing methodology utilized in the production of these test results have been proven to be reliable in a limited validation with an abbreviated program under the Emergency Utilization Authorization provided by the FDA  Testing reported as "Presumptive positive" will be confirmed with secondary testing to ensure result accuracy  Clinical caution and judgement should be used with the interpretation of these results with consideration of the clinical impression and other laboratory testing  Testing reported as "Positive" or "Negative" has been proven to be accurate according to standard laboratory validation requirements  All testing is performed with control materials showing appropriate reactivity at standard intervals  No orders to display              Procedures  Procedures         ED Course                                           MDM  Number of Diagnoses or Management Options  Viral URI with cough: new and requires workup  Diagnosis management comments: Patient presents for evaluation of cough and congestion that started 3 days ago  Held extensive discussion with the patient, and she did ultimately request COVID-19 testing, although she states she does not think that is what this is  The patient reports an extensive history of bronchitis, and she feels that she may benefit from a short course of steroids  I ultimately agreed to the  Will prescribe a short course of steroids in addition to an inhaler and some Tessalon  Patient was advised that she should quarantine at home until receiving the results for her COVID-19 testing  Patient was advised that if she does not have improvement of symptoms the next 2-3 days, she should follow up with her primary care doctor for further evaluation  She was advised return the ED with any new or significantly worsening symptoms  Patient stable for discharge         Amount and/or Complexity of Data Reviewed  Clinical lab tests: ordered  Decide to obtain previous medical records or to obtain history from someone other than the patient: yes  Review and summarize past medical records: yes    Risk of Complications, Morbidity, and/or Mortality  Presenting problems: low  Diagnostic procedures: low  Management options: low    Patient Progress  Patient progress: stable      Disposition  Final diagnoses:   Viral URI with cough     Time reflects when diagnosis was documented in both MDM as applicable and the Disposition within this note     Time User Action Codes Description Comment    9/6/2021 12:05 PM Romel Jose Add [J06 9] Viral URI with cough       ED Disposition     ED Disposition Condition Date/Time Comment    Discharge Stable Mon Sep 6, 2021 12:05 PM Parviz Gentile discharge to home/self care              Follow-up Information     Follow up With Specialties Details Why Contact Info Additional Information    Navjot Gordon MD Internal Medicine Schedule an appointment as soon as possible for a visit  As needed [place] [phone]       [facility] Emergency Department Emergency Medicine  If symptoms worsen [address] Emergency Department,  [address], [place], [place], [address]          Discharge Medication List as of 9/6/2021 12:08 PM      START taking these medications    Details   albuterol (Ventolin HFA) 90 mcg/act inhaler Inhale 1-2 puffs every 6 (six) hours as needed for wheezing, Starting Mon 9/6/2021, Normal      benzonatate (TESSALON PERLES) 100 mg capsule Take 1 capsule (100 mg total) by mouth every 8 (eight) hours, Starting Mon 9/6/2021, Normal      predniSONE 20 mg tablet Take 2 tablets (40 mg total) by mouth daily for 5 days, Starting Mon 9/6/2021, Until Sat 9/11/2021, Normal         CONTINUE these medications which have NOT CHANGED    Details   Lidocaine Viscous HCl (XYLOCAINE) 2 % mucosal solution Swish and spit 15 mL 4 (four) times a day as needed for mouth pain or discomfort, Starting Wed 4/7/2021, Normal      loratadine (CLARITIN) 10 mg tablet Take 1 tablet (10 mg total) by mouth daily, Starting Wed 6/3/2020, No Print      valACYclovir (VALTREX) 500 mg tablet Take 1 tablet (500 mg total) by mouth 2 (two) times a day for 3 days PRN outbreak, Starting Wed 6/3/2020, Until u 4/22/2021, Normal           No discharge procedures on file      PDMP Review     None          ED Provider  Electronically Signed by           Zaid Fowler PA-C  09/06/21 9821

## 2021-10-11 ENCOUNTER — HOSPITAL ENCOUNTER (OUTPATIENT)
Dept: RADIOLOGY | Facility: HOSPITAL | Age: 41
Discharge: HOME | End: 2021-10-11
Payer: COMMERCIAL

## 2021-10-11 ENCOUNTER — TRANSITIONAL CARE MANAGEMENT (OUTPATIENT)
Dept: INTERNAL MEDICINE CLINIC | Facility: CLINIC | Age: 41
End: 2021-10-11

## 2021-10-11 ENCOUNTER — OFFICE VISIT (OUTPATIENT)
Dept: INTERNAL MEDICINE CLINIC | Facility: CLINIC | Age: 41
End: 2021-10-11
Payer: COMMERCIAL

## 2021-10-11 VITALS
HEART RATE: 74 BPM | HEIGHT: 68 IN | DIASTOLIC BLOOD PRESSURE: 78 MMHG | BODY MASS INDEX: 34.01 KG/M2 | WEIGHT: 224.4 LBS | OXYGEN SATURATION: 98 % | SYSTOLIC BLOOD PRESSURE: 122 MMHG
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
## 2021-10-11 DIAGNOSIS — H93.12 TINNITUS OF LEFT EAR: ICD-10-CM

## 2021-10-11 DIAGNOSIS — F41.9 ANXIETY: ICD-10-CM
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
## 2021-10-11 PROCEDURE — 99213 OFFICE O/P EST LOW 20 MIN: CPT | Performed by: NURSE PRACTITIONER

## 2021-10-11 PROCEDURE — 73030 X-RAY EXAM OF SHOULDER: CPT

## 2021-10-11 PROCEDURE — 72050 X-RAY EXAM NECK SPINE 4/5VWS: CPT

## 2021-10-11 PROCEDURE — 72072 X-RAY EXAM THORAC SPINE 3VWS: CPT

## 2021-10-11 RX ORDER — TIZANIDINE HYDROCHLORIDE 4 MG/1
4 CAPSULE, GELATIN COATED ORAL 3 TIMES DAILY
Qty: 21 CAPSULE | Refills: 0 | Status: SHIPPED | OUTPATIENT
Start: 2021-10-11 | End: 2021-10-13 | Stop reason: SDUPTHER

## 2021-10-11 RX ORDER — BUSPIRONE HYDROCHLORIDE 7.5 MG/1
7.5 TABLET ORAL 2 TIMES DAILY
Qty: 60 TABLET | Refills: 5 | Status: SHIPPED | OUTPATIENT
Start: 2021-10-11 | End: 2021-10-13 | Stop reason: SDUPTHER

## 2021-10-13 DIAGNOSIS — F41.9 ANXIETY: ICD-10-CM
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
## 2021-10-14 PROBLEM — H93.12 TINNITUS OF LEFT EAR: Status: ACTIVE | Noted: 2021-10-14
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
## 2021-10-14 RX ORDER — TIZANIDINE HYDROCHLORIDE 4 MG/1
4 CAPSULE, GELATIN COATED ORAL 3 TIMES DAILY
Qty: 21 CAPSULE | Refills: 0 | Status: SHIPPED | OUTPATIENT
Start: 2021-10-14

## 2021-10-14 RX ORDER — BUSPIRONE HYDROCHLORIDE 7.5 MG/1
7.5 TABLET ORAL 2 TIMES DAILY
Qty: 60 TABLET | Refills: 5 | Status: SHIPPED | OUTPATIENT
Start: 2021-10-14

## 2021-11-17 ENCOUNTER — NURSE TRIAGE (OUTPATIENT)
Dept: OTHER | Facility: OTHER | Age: 41
End: 2021-11-17

## 2022-01-03 ENCOUNTER — TELEMEDICINE (OUTPATIENT)
Dept: INTERNAL MEDICINE CLINIC | Facility: CLINIC | Age: 42
End: 2022-01-03
Payer: COMMERCIAL

## 2022-01-03 ENCOUNTER — TELEPHONE (OUTPATIENT)
Dept: OTHER | Facility: OTHER | Age: 42
End: 2022-01-03

## 2022-01-03 DIAGNOSIS — U07.1 COVID-19: Primary | ICD-10-CM

## 2022-01-03 PROCEDURE — 4004F PT TOBACCO SCREEN RCVD TLK: CPT | Performed by: INTERNAL MEDICINE

## 2022-01-03 PROCEDURE — 99213 OFFICE O/P EST LOW 20 MIN: CPT | Performed by: INTERNAL MEDICINE

## 2022-01-03 NOTE — TELEPHONE ENCOUNTER
Patient called today regarding she had a Positive Covid Test Yesterday, when can she go back to work

## 2022-01-03 NOTE — TELEPHONE ENCOUNTER
So 5 days from the start of symptoms (day 0 is the first day), she can return  This will be after Thursday for her as long as she has had no fever for 24 hours without using a fever reducer and her cough has improved   Schedule virtual if she needs a note to return to work

## 2022-01-03 NOTE — TELEPHONE ENCOUNTER
Patient started Saturday with chills, vomiting, fever, cough, dizzy  Patient did take an at home test yesterday and it came back positive          Please advise

## 2022-01-03 NOTE — PROGRESS NOTES
COVID-19 Outpatient Progress Note    Assessment/Plan:    Problem List Items Addressed This Visit     None      Visit Diagnoses     COVID-19    -  Primary         Disposition:     Patient has COVID-19 infection  Based off CDC guidelines, they were recommended to isolate for 5 days from the date of the positive test  If they remain asymptomatic, isolation may be ended followed by 5 days of wearing a mask when around othes to minimize risk of infecting others  If they have a fever, continue to stay home until fever resolves for at least 24 hours  She may return to work on Friday Jan 7 since day 5 from when her symptoms started is Jan 6 as long as she has not had a fever by then without a fever reducer  I advised she call if she continues to have a fever to symptoms worsen    I have spent 15 minutes directly with the patient  Greater than 50% of this time was spent in counseling/coordination of care regarding: patient and family education and impressions  Encounter provider Eli Mcneal MD    Provider located at [address] [place] [phone]    Recent Visits  No visits were found meeting these conditions  Showing recent visits within past 7 days and meeting all other requirements  Today's Visits  Date Type Provider Dept   01/03/22 Telemedicine Eli Mcneal MD [id number] [facility] today's visits and meeting all other requirements  Future Appointments  No visits were found meeting these conditions  Showing future appointments within next 150 days and meeting all other requirements     This virtual check-in was done via [facility] and patient was informed that this is a secure, HIPAA-compliant platform  She agrees to proceed  Patient agrees to participate in a virtual check in via telephone or video visit instead of presenting to the office to address urgent/immediate medical needs   Patient is aware this is a billable service  After connecting through [facility], the patient was identified by name and date of birth  Edwin Pittman was informed that this was a telemedicine visit and that the exam was being conducted confidentially over secure lines  My office door was closed  No one else was in the room  Edwin Pittman acknowledged consent and understanding of privacy and security of the telemedicine visit  I informed the patient that I have reviewed her record in Epic and presented the opportunity for her to ask any questions regarding the visit today  The patient agreed to participate  Verification of patient location:  Patient is located in the following state in which I hold an active license: [place]    Subjective:   Edwin Pittman is a 35 y o  female who has been screened for COVID-19  Symptom change since last report: improving  Patient's symptoms include fever, chills, fatigue, cough, chest tightness, vomiting, diarrhea and myalgias  Patient denies congestion, rhinorrhea, sore throat, anosmia, loss of taste, shortness of breath, abdominal pain, nausea and headaches  Date of symptom onset: 1/1/2022  Date of exposure: 1/3/2022  Date of positive COVID-19 PCR: 1/2/2022  COVID-19 vaccination status: Fully vaccinated (primary series)    Amita Wilcox has been staying home and has isolated themselves in her home  She is taking care to not share personal items and is cleaning all surfaces that are touched often, like counters, tabletops, and doorknobs using household cleaning sprays or wipes  She is wearing a mask when she leaves her room       +home test yesterday  Needs to know when she can return to work    Lab Results   Component Value Date    SARSCOV2 Negative 09/06/2021    Colten Never Not Detected 11/20/2020     Past Medical History:   Diagnosis Date    Acute non-recurrent maxillary sinusitis 1/27/2020    Encounter for consultation 8/18/2020    Exposure to COVID-19 virus 11/24/2020    Ovarian cyst     Psychiatric disorder     Upper respiratory tract infection 10/18/2019     Past Surgical History:   Procedure Laterality Date    APPENDECTOMY      TUBAL LIGATION       Current Outpatient Medications   Medication Sig Dispense Refill    albuterol (Ventolin HFA) 90 mcg/act inhaler Inhale 1-2 puffs every 6 (six) hours as needed for wheezing 8 g 0    benzonatate (TESSALON PERLES) 100 mg capsule Take 1 capsule (100 mg total) by mouth every 8 (eight) hours (Patient not taking: Reported on 10/11/2021) 21 capsule 0    busPIRone (BUSPAR) 7 5 mg tablet Take 1 tablet (7 5 mg total) by mouth 2 (two) times a day 60 tablet 5    Lidocaine Viscous HCl (XYLOCAINE) 2 % mucosal solution Swish and spit 15 mL 4 (four) times a day as needed for mouth pain or discomfort (Patient not taking: Reported on 4/22/2021) 100 mL 0    loratadine (CLARITIN) 10 mg tablet Take 1 tablet (10 mg total) by mouth daily (Patient not taking: Reported on 1/28/2021)      TiZANidine (ZANAFLEX) 4 MG capsule Take 1 capsule (4 mg total) by mouth 3 (three) times a day 21 capsule 0    valACYclovir (VALTREX) 500 mg tablet Take 1 tablet (500 mg total) by mouth 2 (two) times a day for 3 days PRN outbreak 60 tablet 3     No current facility-administered medications for this visit  Allergies   Allergen Reactions    Other Allergic Rhinitis       Review of Systems   Constitutional: Positive for chills, fatigue and fever  HENT: Negative for congestion, rhinorrhea and sore throat  Respiratory: Positive for cough and chest tightness  Negative for shortness of breath  Gastrointestinal: Positive for diarrhea and vomiting  Negative for abdominal pain and nausea  Musculoskeletal: Positive for myalgias  Neurological: Negative for headaches  Objective: There were no vitals filed for this visit  Physical Exam  Constitutional:       General: She is not in acute distress  Appearance: She is not ill-appearing, toxic-appearing or diaphoretic     Pulmonary:      Effort: No respiratory distress  Psychiatric:         Mood and Affect: Mood normal          Behavior: Behavior normal          VIRTUAL VISIT Jordy verbally agrees to participate in [employer]  Pt is aware that [employer] could be limited without vital signs or the ability to perform a full hands-on physical exam  Michelle Costa understands she or the provider may request at any time to terminate the video visit and request the patient to seek care or treatment in person

## 2022-03-23 ENCOUNTER — APPOINTMENT (EMERGENCY)
Dept: CT IMAGING | Facility: HOSPITAL | Age: 42
End: 2022-03-23
Payer: COMMERCIAL

## 2022-03-23 ENCOUNTER — HOSPITAL ENCOUNTER (EMERGENCY)
Facility: HOSPITAL | Age: 42
Discharge: HOME | End: 2022-03-23
Attending: EMERGENCY MEDICINE
Payer: COMMERCIAL

## 2022-03-23 VITALS
SYSTOLIC BLOOD PRESSURE: 134 MMHG | TEMPERATURE: 97.6 F | HEART RATE: 73 BPM | OXYGEN SATURATION: 100 % | HEIGHT: 67 IN | BODY MASS INDEX: 36.88 KG/M2 | WEIGHT: 235 LBS | DIASTOLIC BLOOD PRESSURE: 98 MMHG | RESPIRATION RATE: 18 BRPM

## 2022-03-23 DIAGNOSIS — K04.7 DENTAL ABSCESS: Primary | ICD-10-CM

## 2022-03-23 PROCEDURE — 99284 EMERGENCY DEPT VISIT MOD MDM: CPT

## 2022-03-23 PROCEDURE — G1004 CDSM NDSC: HCPCS

## 2022-03-23 PROCEDURE — 96372 THER/PROPH/DIAG INJ SC/IM: CPT

## 2022-03-23 PROCEDURE — 96365 THER/PROPH/DIAG IV INF INIT: CPT

## 2022-03-23 PROCEDURE — 70486 CT MAXILLOFACIAL W/O DYE: CPT

## 2022-03-23 PROCEDURE — 99284 EMERGENCY DEPT VISIT MOD MDM: CPT | Performed by: EMERGENCY MEDICINE

## 2022-03-23 RX ORDER — CLINDAMYCIN HYDROCHLORIDE 300 MG/1
300 CAPSULE ORAL 4 TIMES DAILY
Qty: 40 CAPSULE | Refills: 0 | Status: SHIPPED | OUTPATIENT
Start: 2022-03-23 | End: 2022-04-02

## 2022-03-23 RX ORDER — KETOROLAC TROMETHAMINE 30 MG/ML
30 INJECTION, SOLUTION INTRAMUSCULAR; INTRAVENOUS ONCE
Status: COMPLETED | OUTPATIENT
Start: 2022-03-23 | End: 2022-03-23

## 2022-03-23 RX ORDER — CLINDAMYCIN PHOSPHATE 600 MG/50ML
600 INJECTION INTRAVENOUS ONCE
Status: COMPLETED | OUTPATIENT
Start: 2022-03-23 | End: 2022-03-23

## 2022-03-23 RX ORDER — KETOROLAC TROMETHAMINE 30 MG/ML
30 INJECTION, SOLUTION INTRAMUSCULAR; INTRAVENOUS ONCE
Status: DISCONTINUED | OUTPATIENT
Start: 2022-03-23 | End: 2022-03-23

## 2022-03-23 RX ORDER — CLINDAMYCIN HYDROCHLORIDE 150 MG/1
300 CAPSULE ORAL ONCE
Status: COMPLETED | OUTPATIENT
Start: 2022-03-23 | End: 2022-03-23

## 2022-03-23 RX ORDER — ACETAMINOPHEN 325 MG/1
650 TABLET ORAL ONCE
Status: COMPLETED | OUTPATIENT
Start: 2022-03-23 | End: 2022-03-23

## 2022-03-23 RX ADMIN — KETOROLAC TROMETHAMINE 30 MG: 30 INJECTION, SOLUTION INTRAMUSCULAR at 16:52

## 2022-03-23 RX ADMIN — CLINDAMYCIN PHOSPHATE 600 MG: 600 INJECTION, SOLUTION INTRAVENOUS at 19:16

## 2022-03-23 RX ADMIN — ACETAMINOPHEN 650 MG: 325 TABLET, FILM COATED ORAL at 15:28

## 2022-03-23 RX ADMIN — CLINDAMYCIN HYDROCHLORIDE 300 MG: 150 CAPSULE ORAL at 16:29

## 2022-03-23 NOTE — ED PROVIDER NOTES
History  Chief Complaint   Patient presents with    Facial Swelling     Pt presents to the ED from home with complaint of right sided facial swelling; states was at dentist Friday 18 Mar 2022, informed she has infection and requires root canal, placed on antibiotic and oxycodone; states swelling started x2 days ago; states called dentist who stated "I don't know why your face is swelling" and was instructed to come to ED for evaluation; denies nausea, vomiting, fever and/or diarrhea; taking motrin with no relief     HPI  36 yo female with cc swelling a lot over rt side face; dx with tooth abscess, told need root canal for infected chipped tooth upper and placed on amoxicialln by dentist; past 2 days notes severe swelling of rt face, with pain; no fevers; no trismus;   Prior to Admission Medications   Prescriptions Last Dose Informant Patient Reported? Taking?    Lidocaine Viscous HCl (XYLOCAINE) 2 % mucosal solution   No No   Sig: Swish and spit 15 mL 4 (four) times a day as needed for mouth pain or discomfort   Patient not taking: Reported on 4/22/2021   TiZANidine (ZANAFLEX) 4 MG capsule Not Taking at Unknown time  No No   Sig: Take 1 capsule (4 mg total) by mouth 3 (three) times a day   Patient not taking: Reported on 3/23/2022    albuterol (Ventolin HFA) 90 mcg/act inhaler More than a month at Unknown time  No No   Sig: Inhale 1-2 puffs every 6 (six) hours as needed for wheezing   benzonatate (TESSALON PERLES) 100 mg capsule   No No   Sig: Take 1 capsule (100 mg total) by mouth every 8 (eight) hours   Patient not taking: Reported on 10/11/2021   busPIRone (BUSPAR) 7 5 mg tablet Not Taking at Unknown time  No No   Sig: Take 1 tablet (7 5 mg total) by mouth 2 (two) times a day   Patient not taking: Reported on 3/23/2022    loratadine (CLARITIN) 10 mg tablet   No No   Sig: Take 1 tablet (10 mg total) by mouth daily   Patient not taking: Reported on 1/28/2021   valACYclovir (VALTREX) 500 mg tablet   No No   Sig: Take 1 tablet (500 mg total) by mouth 2 (two) times a day for 3 days PRN outbreak      Facility-Administered Medications: None       Past Medical History:   Diagnosis Date    Acute non-recurrent maxillary sinusitis 1/27/2020    Encounter for consultation 8/18/2020    Exposure to COVID-19 virus 11/24/2020    Ovarian cyst     Psychiatric disorder     Upper respiratory tract infection 10/18/2019       Past Surgical History:   Procedure Laterality Date    APPENDECTOMY      TUBAL LIGATION         Family History   Problem Relation Age of Onset    Depression Family     Diabetes Family     Hypertension Family     Schizophrenia Family     No Known Problems Mother     No Known Problems Father     No Known Problems Sister     No Known Problems Daughter     No Known Problems Maternal Grandmother     No Known Problems Maternal Grandfather     No Known Problems Paternal Grandmother     No Known Problems Paternal Grandfather     No Known Problems Son     No Known Problems Son     No Known Problems Son     No Known Problems Paternal Aunt      I have reviewed and agree with the history as documented  E-Cigarette/Vaping    E-Cigarette Use Never User      E-Cigarette/Vaping Substances    Nicotine No     THC No     CBD No     Flavoring No     Other No     Unknown No      Social History     Tobacco Use    Smoking status: Current Every Day Smoker     Packs/day: 0 50     Years: 22 00     Pack years: 11 00     Types: Cigarettes    Smokeless tobacco: Never Used   Vaping Use    Vaping Use: Never used   Substance Use Topics    Alcohol use: Yes     Comment: 1 x a year    Drug use: No       Review of Systems   Constitutional: Negative for chills and fever  HENT: Positive for dental problem, facial swelling, sinus pressure and sinus pain  Negative for ear pain, mouth sores, sore throat and trouble swallowing  Eyes: Negative for pain and visual disturbance     Respiratory: Negative for cough and shortness of breath  Cardiovascular: Negative for chest pain and palpitations  Gastrointestinal: Negative for abdominal pain and vomiting  Genitourinary: Negative for dysuria and hematuria  Musculoskeletal: Negative for arthralgias and back pain  Skin: Negative for color change and rash  Neurological: Negative for seizures and syncope  All other systems reviewed and are negative  Physical Exam  Physical Exam  Vitals and nursing note reviewed  Constitutional:       General: She is not in acute distress  Appearance: She is well-developed  HENT:      Head: Normocephalic and atraumatic  Comments: Swelling over rt maxillary area, until just below orbit; No orbital cellutis;no upper lid involvment; No proptosis, eomi;    TOOTH 5 is cracked and tender to percussion     Nose: Nose normal       Mouth/Throat:      Mouth: Mucous membranes are moist    Eyes:      General:         Right eye: No discharge  Left eye: No discharge  Extraocular Movements: Extraocular movements intact  Conjunctiva/sclera: Conjunctivae normal    Cardiovascular:      Rate and Rhythm: Normal rate and regular rhythm  Heart sounds: No murmur heard  Pulmonary:      Effort: Pulmonary effort is normal  No respiratory distress  Breath sounds: Normal breath sounds  Abdominal:      Palpations: Abdomen is soft  Tenderness: There is no abdominal tenderness  Musculoskeletal:         General: Normal range of motion  Cervical back: Normal range of motion and neck supple  No rigidity  Lymphadenopathy:      Cervical: No cervical adenopathy  Skin:     General: Skin is warm and dry  Capillary Refill: Capillary refill takes less than 2 seconds  Neurological:      General: No focal deficit present  Mental Status: She is alert and oriented to person, place, and time           Vital Signs  ED Triage Vitals [03/23/22 1509]   Temperature Pulse Respirations Blood Pressure SpO2 97 6 °F (36 4 °C) 82 16 145/94 100 %      Temp Source Heart Rate Source Patient Position - Orthostatic VS BP Location FiO2 (%)   Oral Monitor Sitting Left arm --      Pain Score       8           Vitals:    03/23/22 1509 03/23/22 1913   BP: 145/94 134/98   Pulse: 82 73   Patient Position - Orthostatic VS: Sitting Lying         Visual Acuity      ED Medications  Medications   acetaminophen (TYLENOL) tablet 650 mg (650 mg Oral Given 3/23/22 1528)   clindamycin (CLEOCIN) capsule 300 mg (300 mg Oral Given 3/23/22 1629)   ketorolac (TORADOL) injection 30 mg (30 mg Intramuscular Given 3/23/22 1652)   clindamycin (CLEOCIN) IVPB (premix in dextrose) 600 mg 50 mL (0 mg Intravenous Stopped 3/23/22 1953)       Diagnostic Studies  Results Reviewed     None                 CT sinus wo contrast   Final Result by Jonna Bermudez MD (03/23 1649)      Erosion right anterior maxilla at the base of the right canine tooth suspicious for odontogenic disease/osteomyelitis  There is adjacent anterior phlegmonous change  Mild chronic mucosal thickening of the inferior right maxillary sinus  No air-fluid levels  Workstation performed: [id number]                    Procedures  Procedures         ED Course       awaiting ct scan results;    awaiting consultation reply;              Following ct result, consult to oms, 5pm;  Discussed with patient and       consultation with dr Hortencia Falcon, who concurred with iv clindmaycin, discussed ct findings, and will followup patient in office tomorrow;     Discussed with patient and  reasons for muliple tests, and delays and delays for consultations, all to get the best answer for how to assess and address patients clinical situation;             patient notes marked improvement prior to dc;         MDM      Ct notes no fluid in sinus, bone erosion, possible osteomyeleits , noted by omfs to often be normal finding for erosion in inifection and not an actual osteomultitis; Patient notes pain is markedly relieved following iv clindamycin and im toradol; At dc, well appearing, notes her pain is addressed and she feels fine at time of dc;   Disposition  Final diagnoses:   Dental abscess     Time reflects when diagnosis was documented in both MDM as applicable and the Disposition within this note     Time User Action Codes Description Comment    3/23/2022  4:15 PM Blossom Berrios Add [K04 7] Dental abscess       ED Disposition     ED Disposition Condition Date/Time Comment    Discharge Stable Wed Mar 23, 2022  7:56 PM Mark Garciaopshire discharge to home/self care              Follow-up Information     Follow up With Specialties Details Why Contact Info    Fredrick Parker DDS Oral Maxillofacial Surgery, Oral Surgery   [facility] [address]            Discharge Medication List as of 3/23/2022  8:35 PM      START taking these medications    Details   clindamycin (CLEOCIN) 300 MG capsule Take 1 capsule (300 mg total) by mouth 4 (four) times a day for 10 days, Starting Wed 3/23/2022, Until Sat 4/2/2022, Normal         CONTINUE these medications which have NOT CHANGED    Details   albuterol (Ventolin HFA) 90 mcg/act inhaler Inhale 1-2 puffs every 6 (six) hours as needed for wheezing, Starting Mon 9/6/2021, Normal      benzonatate (TESSALON PERLES) 100 mg capsule Take 1 capsule (100 mg total) by mouth every 8 (eight) hours, Starting Mon 9/6/2021, Normal      busPIRone (BUSPAR) 7 5 mg tablet Take 1 tablet (7 5 mg total) by mouth 2 (two) times a day, Starting u 10/14/2021, Normal      Lidocaine Viscous HCl (XYLOCAINE) 2 % mucosal solution Swish and spit 15 mL 4 (four) times a day as needed for mouth pain or discomfort, Starting Wed 4/7/2021, Normal      loratadine (CLARITIN) 10 mg tablet Take 1 tablet (10 mg total) by mouth daily, Starting Wed 6/3/2020, No Print      TiZANidine (ZANAFLEX) 4 MG capsule Take 1 capsule (4 mg total) by mouth 3 (three) times a day, Starting Thu 10/14/2021, Normal      valACYclovir (VALTREX) 500 mg tablet Take 1 tablet (500 mg total) by mouth 2 (two) times a day for 3 days PRN outbreak, Starting Wed 6/3/2020, Until Mon 10/11/2021, Normal             No discharge procedures on file      PDMP Review     None          ED Provider  Electronically Signed by           Tanya Rodriguez MD  03/23/22 2051

## 2022-03-23 NOTE — Clinical Note
Missy Alva was seen and treated in our emergency department on 3/23/2022  Diagnosis:     Giovanny Banks    She may return on this date: Off work as needed for symptoms thru march 27 as needed     If you have any questions or concerns, please don't hesitate to call        Kenisha Lyman MD    ______________________________           _______________          _______________  [facility] Representative                              Date                                Time

## 2022-03-23 NOTE — ED TRIAGE NOTES
Pt presents to the ED from home with complaint of right sided facial swelling;  was at dentist Friday 18 Mar 2022, informed she has infection and requires root canal, placed on antibiotic and oxycodone; states swelling started x2 days ago;  called dentist who stated "I don't know why your face is swelling" and was instructed to come to ED for evaluation; denies nausea, vomiting, fever and/or diarrhea; taking motrin with no relief

## 2022-03-23 NOTE — DISCHARGE INSTRUCTIONS
Tomorrow morning call oral surgery , dr rodriguez office     Stay well hydrated;  Swish mouth with salt water or listerine several times a day, follow dentist instructions; Recheck with primary care doctor tomorrow as needed,   Call dentist office today to let them know of your visit to ED and ct scan results; Take clindamyicn 300 mg 4 times a day; Ask pharmacist for Probiotic to counter the effects of the antibiotic on the bowel;     If severe swelling worsens from what it is now, call the oral surgery office, or return to the emergency department;

## 2022-08-04 ENCOUNTER — TELEMEDICINE (OUTPATIENT)
Dept: INTERNAL MEDICINE CLINIC | Facility: CLINIC | Age: 42
End: 2022-08-04
Payer: COMMERCIAL

## 2022-08-04 DIAGNOSIS — B34.9 VIRAL ILLNESS: Primary | ICD-10-CM

## 2022-08-04 PROCEDURE — 99213 OFFICE O/P EST LOW 20 MIN: CPT | Performed by: INTERNAL MEDICINE

## 2022-08-04 NOTE — PROGRESS NOTES
Virtual Regular Visit    Verification of patient location:    Patient is located in the following state in which I hold an active license { amb virtual patient location:[address]}      Assessment/Plan:    Problem List Items Addressed This Visit    None              Reason for visit is   Chief Complaint   Patient presents with    Virtual Regular Visit        Encounter provider Eduar Serrano MD    Provider located at [address] [place] [phone]      Recent Visits  No visits were found meeting these conditions  Showing recent visits within past 7 days and meeting all other requirements  Future Appointments  No visits were found meeting these conditions  Showing future appointments within next 150 days and meeting all other requirements       The patient was identified by name and date of birth  Constantino Green was informed that this is a telemedicine visit and that the visit is being conducted through {AMB VIRTUAL VISIT ALHNEM:27093}  {Telemedicine confidentiality :50992} {Telemedicine participants:89000}  She acknowledged consent and understanding of privacy and security of the video platform  The patient has agreed to participate and understands they can discontinue the visit at any time  Patient is aware this is a billable service  Subjective  Constantino Green is a 38 y o  female ***         HPI     Past Medical History:   Diagnosis Date    Acute non-recurrent maxillary sinusitis 1/27/2020    Encounter for consultation 8/18/2020    Exposure to COVID-19 virus 11/24/2020    Ovarian cyst     Psychiatric disorder     Upper respiratory tract infection 10/18/2019       Past Surgical History:   Procedure Laterality Date    APPENDECTOMY      TUBAL LIGATION         Current Outpatient Medications   Medication Sig Dispense Refill    albuterol (Ventolin HFA) 90 mcg/act inhaler Inhale 1-2 puffs every 6 (six) hours as needed for wheezing 8 g 0    benzonatate (TESSALON PERLES) 100 mg capsule Take 1 capsule (100 mg total) by mouth every 8 (eight) hours (Patient not taking: Reported on 10/11/2021) 21 capsule 0    busPIRone (BUSPAR) 7 5 mg tablet Take 1 tablet (7 5 mg total) by mouth 2 (two) times a day (Patient not taking: Reported on 3/23/2022 ) 60 tablet 5    Lidocaine Viscous HCl (XYLOCAINE) 2 % mucosal solution Swish and spit 15 mL 4 (four) times a day as needed for mouth pain or discomfort (Patient not taking: Reported on 4/22/2021) 100 mL 0    loratadine (CLARITIN) 10 mg tablet Take 1 tablet (10 mg total) by mouth daily (Patient not taking: Reported on 1/28/2021)      TiZANidine (ZANAFLEX) 4 MG capsule Take 1 capsule (4 mg total) by mouth 3 (three) times a day (Patient not taking: Reported on 3/23/2022 ) 21 capsule 0    valACYclovir (VALTREX) 500 mg tablet Take 1 tablet (500 mg total) by mouth 2 (two) times a day for 3 days PRN outbreak 60 tablet 3     No current facility-administered medications for this visit  Allergies   Allergen Reactions    Mucinex Cough For Kids [Dextromethorphan-Guaifenesin] Other (See Comments)     Per verbal of pt "It causes body aches"    Other Allergic Rhinitis       Review of Systems    Video Exam    There were no vitals filed for this visit  Physical Exam     {Time Spent:03041}    VIRTUAL VISIT DISCLAIMER      Hernandez Costa verbally agrees to participate in [employer]  Pt is aware that [employer] could be limited without vital signs or the ability to perform a full hands-on physical exam  Michelle Costa understands she or the provider may request at any time to terminate the video visit and request the patient to seek care or treatment in person

## 2022-08-04 NOTE — PROGRESS NOTES
COVID-19 Outpatient Progress Note    Assessment/Plan:    Problem List Items Addressed This Visit    None     Visit Diagnoses     Viral illness    -  Primary         Disposition:     Discussed symptom directed medication options with patient  I recommended she perform a home COVID test and call/message with the result  She may do salt water gargles and take OTC meds for her symptoms    I have spent 15 minutes directly with the patient  Greater than 50% of this time was spent in counseling/coordination of care regarding: instructions for management and impressions  Encounter provider Clif Sales MD    Provider located at [address] [place] [phone]    Recent Visits  No visits were found meeting these conditions  Showing recent visits within past 7 days and meeting all other requirements  Today's Visits  Date Type Provider Dept   08/04/22 Telemedicine Clif Sales MD [id number] today's visits and meeting all other requirements  Future Appointments  No visits were found meeting these conditions  Showing future appointments within next 150 days and meeting all other requirements     This virtual check-in was done via [facility] and patient was informed that this is a secure, HIPAA-compliant platform  She agrees to proceed  Patient agrees to participate in a virtual check in via telephone or video visit instead of presenting to the office to address urgent/immediate medical needs  Patient is aware this is a billable service  After connecting through [facility], the patient was identified by name and date of birth  Alicia Ramirez was informed that this was a telemedicine visit and that the exam was being conducted confidentially over secure lines  My office door was closed  No one else was in the room  Alicia Ramirez acknowledged consent and understanding of privacy and security of the telemedicine visit   I informed the patient that I have reviewed her record in Epic and presented the opportunity for her to ask any questions regarding the visit today  The patient agreed to participate  Verification of patient location:  Patient is located in the following state in which I hold an active license: [place]    Subjective:   Lynette Fowler is a 38 y o  female who is concerned about COVID-19  Patient's symptoms include fatigue, sore throat, myalgias (mainly in the neck) and headache  Patient denies fever, chills, congestion, rhinorrhea, cough, shortness of breath, vomiting and diarrhea  - Date of symptom onset: 8/1/2022      COVID-19 vaccination status: Fully vaccinated (primary series)    Exposure:   Contact with a person who is under investigation (PUI) for or who is positive for COVID-19 within the last 14 days?: No    Hospitalized recently for fever and/or lower respiratory symptoms?: No      Currently a healthcare worker 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 (this may include long-term care, correctional and long term facilities; homeless shelters; assisted-living facilities and group homes ): No      Resident in a special setting where the risk of COVID-19 transmission may be high? (this may include long-term care, correctional and long term facilities; homeless shelters; assisted-living facilities and group homes ): No        Taking Claritin PRN   had Est[place] coming from vacation about 2 weeks ago  She tested twice then and was negative   She has not tested since her symptoms started    Lab Results   Component Value Date    SARSCOV2 Negative 09/06/2021    Cassie Segovia Not Detected 11/20/2020     Past Medical History:   Diagnosis Date    Acute non-recurrent maxillary sinusitis 1/27/2020    Encounter for consultation 8/18/2020    Exposure to COVID-19 virus 11/24/2020    Ovarian cyst     Psychiatric disorder     Upper respiratory tract infection 10/18/2019     Past Surgical History:   Procedure Laterality Date    APPENDECTOMY      TUBAL LIGATION       Current Outpatient Medications   Medication Sig Dispense Refill    albuterol (Ventolin HFA) 90 mcg/act inhaler Inhale 1-2 puffs every 6 (six) hours as needed for wheezing 8 g 0    benzonatate (TESSALON PERLES) 100 mg capsule Take 1 capsule (100 mg total) by mouth every 8 (eight) hours (Patient not taking: Reported on 10/11/2021) 21 capsule 0    busPIRone (BUSPAR) 7 5 mg tablet Take 1 tablet (7 5 mg total) by mouth 2 (two) times a day (Patient not taking: Reported on 3/23/2022 ) 60 tablet 5    Lidocaine Viscous HCl (XYLOCAINE) 2 % mucosal solution Swish and spit 15 mL 4 (four) times a day as needed for mouth pain or discomfort (Patient not taking: Reported on 4/22/2021) 100 mL 0    loratadine (CLARITIN) 10 mg tablet Take 1 tablet (10 mg total) by mouth daily (Patient not taking: Reported on 1/28/2021)      TiZANidine (ZANAFLEX) 4 MG capsule Take 1 capsule (4 mg total) by mouth 3 (three) times a day (Patient not taking: Reported on 3/23/2022 ) 21 capsule 0    valACYclovir (VALTREX) 500 mg tablet Take 1 tablet (500 mg total) by mouth 2 (two) times a day for 3 days PRN outbreak 60 tablet 3     No current facility-administered medications for this visit  Allergies   Allergen Reactions    Mucinex Cough For Kids [Dextromethorphan-Guaifenesin] Other (See Comments)     Per verbal of pt "It causes body aches"    Other Allergic Rhinitis       Review of Systems   Constitutional: Positive for fatigue  Negative for chills and fever  HENT: Positive for sore throat  Negative for congestion and rhinorrhea  Respiratory: Negative for cough and shortness of breath  Gastrointestinal: Negative for diarrhea and vomiting  Musculoskeletal: Positive for myalgias (mainly in the neck)  Neurological: Positive for headaches  Objective: There were no vitals filed for this visit      Physical Exam  Constitutional:       General: She is not in acute distress  Appearance: She is not ill-appearing, toxic-appearing or diaphoretic  HENT:      Mouth/Throat:      Pharynx: No oropharyngeal exudate or posterior oropharyngeal erythema  Pulmonary:      Effort: No respiratory distress  Psychiatric:         Mood and Affect: Mood normal          Behavior: Behavior normal          VIRTUAL VISIT Jordy verbally agrees to participate in [employer]  Pt is aware that [employer] could be limited without vital signs or the ability to perform a full hands-on physical exam  Michelle Costa understands she or the provider may request at any time to terminate the video visit and request the patient to seek care or treatment in person

## 2022-09-25 ENCOUNTER — OFFICE VISIT (OUTPATIENT)
Dept: URGENT CARE | Age: 42
End: 2022-09-25
Payer: COMMERCIAL

## 2022-09-25 VITALS — TEMPERATURE: 97.9 F | HEART RATE: 62 BPM | OXYGEN SATURATION: 99 % | RESPIRATION RATE: 14 BRPM

## 2022-09-25 DIAGNOSIS — R05.1 ACUTE COUGH: Primary | ICD-10-CM

## 2022-09-25 PROCEDURE — 99213 OFFICE O/P EST LOW 20 MIN: CPT | Performed by: STUDENT IN AN ORGANIZED HEALTH CARE EDUCATION/TRAINING PROGRAM

## 2022-09-25 RX ORDER — AZITHROMYCIN 250 MG/1
TABLET, FILM COATED ORAL
Qty: 6 TABLET | Refills: 0 | Status: SHIPPED | OUTPATIENT
Start: 2022-09-25 | End: 2022-09-29

## 2022-09-25 NOTE — PROGRESS NOTES
[facility]        NAME: Stefano Lopez is a 38 y o  female  : 1980    MRN: [id number]  DATE: 2022  TIME: 11:06 AM    Assessment and Plan   Acute cough [R05 1]  1  Acute cough  azithromycin (ZITHROMAX) 250 mg tablet         Patient Instructions       Follow up with PCP in 3-5 days  Proceed to  ER if symptoms worsen  Chief Complaint     Chief Complaint   Patient presents with    Cough     Stated with congestion earlier in the week  Uncontrollable cough started yesterday  Tried allergy meds and mucinex  History of Present Illness       HPI  Patient presents with upper respiratory symptoms including a very bad cough, congestion ongoing for the past 24 hours  Patient has been trying over-the-counter medications with no relief  Patient has been using Mucinex with no relief  Then any fevers but has occasional chills    Review of Systems   Review of Systems  Per HPI    Current Medications       Current Outpatient Medications:     azithromycin (ZITHROMAX) 250 mg tablet, Take 2 tablets today then 1 tablet daily x 4 days, Disp: 6 tablet, Rfl: 0    albuterol (Ventolin HFA) 90 mcg/act inhaler, Inhale 1-2 puffs every 6 (six) hours as needed for wheezing, Disp: 8 g, Rfl: 0    benzonatate (TESSALON PERLES) 100 mg capsule, Take 1 capsule (100 mg total) by mouth every 8 (eight) hours (Patient not taking: Reported on 10/11/2021), Disp: 21 capsule, Rfl: 0    busPIRone (BUSPAR) 7 5 mg tablet, Take 1 tablet (7 5 mg total) by mouth 2 (two) times a day (Patient not taking: Reported on 3/23/2022 ), Disp: 60 tablet, Rfl: 5    Lidocaine Viscous HCl (XYLOCAINE) 2 % mucosal solution, Swish and spit 15 mL 4 (four) times a day as needed for mouth pain or discomfort (Patient not taking: Reported on 2021), Disp: 100 mL, Rfl: 0    loratadine (CLARITIN) 10 mg tablet, Take 1 tablet (10 mg total) by mouth daily (Patient not taking: Reported on 2021), Disp: , Rfl:     TiZANidine (ZANAFLEX) 4 MG capsule, Take 1 capsule (4 mg total) by mouth 3 (three) times a day (Patient not taking: Reported on 3/23/2022 ), Disp: 21 capsule, Rfl: 0    valACYclovir (VALTREX) 500 mg tablet, Take 1 tablet (500 mg total) by mouth 2 (two) times a day for 3 days PRN outbreak, Disp: 60 tablet, Rfl: 3    Current Allergies     Allergies as of 09/25/2022 - Reviewed 08/04/2022   Allergen Reaction Noted    Mucinex cough for kids [dextromethorphan-guaifenesin] Other (See Comments) 03/23/2022    Other Allergic Rhinitis 03/18/2013            The following portions of the patient's history were reviewed and updated as appropriate: allergies, current medications, past family history, past medical history, past social history, past surgical history and problem list      Past Medical History:   Diagnosis Date    Acute non-recurrent maxillary sinusitis 1/27/2020    Encounter for consultation 8/18/2020    Exposure to COVID-19 virus 11/24/2020    Ovarian cyst     Psychiatric disorder     Upper respiratory tract infection 10/18/2019       Past Surgical History:   Procedure Laterality Date    APPENDECTOMY      TUBAL LIGATION         Family History   Problem Relation Age of Onset    Depression Family     Diabetes Family     Hypertension Family     Schizophrenia Family     No Known Problems Mother     No Known Problems Father     No Known Problems Sister     No Known Problems Daughter     No Known Problems Maternal Grandmother     No Known Problems Maternal Grandfather     No Known Problems Paternal Grandmother     No Known Problems Paternal Grandfather     No Known Problems Son     No Known Problems Son     No Known Problems Son     No Known Problems Paternal Aunt          Medications have been verified  Objective   Pulse 62   Temp 97 9 °F (36 6 °C) (Temporal)   Resp 14   SpO2 99%   No LMP recorded  Physical Exam     Physical Exam  Constitutional:       General: She is not in acute distress  Appearance: Normal appearance  HENT:      Head: Normocephalic  Nose: No congestion or rhinorrhea  Mouth/Throat:      Mouth: Mucous membranes are moist       Pharynx: Posterior oropharyngeal erythema present  No oropharyngeal exudate  Eyes:      General:         Right eye: No discharge  Left eye: No discharge  Conjunctiva/sclera: Conjunctivae normal    Cardiovascular:      Rate and Rhythm: Normal rate and regular rhythm  Pulses: Normal pulses  Pulmonary:      Effort: Pulmonary effort is normal  No respiratory distress  Abdominal:      General: Abdomen is flat  There is no distension  Palpations: Abdomen is soft  Tenderness: There is no abdominal tenderness  Musculoskeletal:      Cervical back: Neck supple  Lymphadenopathy:      Cervical: Cervical adenopathy present  Skin:     General: Skin is warm  Capillary Refill: Capillary refill takes less than 2 seconds  Neurological:      Mental Status: She is alert and oriented to person, place, and time
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
## 2022-10-12 PROBLEM — J02.9 ACUTE PHARYNGITIS: Status: RESOLVED | Noted: 2021-04-26 | Resolved: 2022-10-12

## 2022-12-18 ENCOUNTER — APPOINTMENT (EMERGENCY)
Dept: RADIOLOGY | Facility: HOSPITAL | Age: 42
End: 2022-12-18

## 2022-12-18 ENCOUNTER — HOSPITAL ENCOUNTER (EMERGENCY)
Facility: HOSPITAL | Age: 42
Discharge: HOME | End: 2022-12-18
Attending: EMERGENCY MEDICINE

## 2022-12-18 VITALS
RESPIRATION RATE: 22 BRPM | HEART RATE: 125 BPM | SYSTOLIC BLOOD PRESSURE: 142 MMHG | TEMPERATURE: 100.5 F | OXYGEN SATURATION: 100 % | DIASTOLIC BLOOD PRESSURE: 65 MMHG

## 2022-12-18 DIAGNOSIS — J11.1 INFLUENZA: Primary | ICD-10-CM

## 2022-12-18 LAB
ANION GAP SERPL CALCULATED.3IONS-SCNC: 5 MMOL/L (ref 4–13)
BASOPHILS # BLD AUTO: 0.01 THOUSANDS/ÂΜL (ref 0–0.1)
BASOPHILS NFR BLD AUTO: 0 % (ref 0–1)
BUN SERPL-MCNC: 7 MG/DL (ref 5–25)
CALCIUM SERPL-MCNC: 10.8 MG/DL (ref 8.4–10.2)
CHLORIDE SERPL-SCNC: 112 MMOL/L (ref 96–108)
CO2 SERPL-SCNC: 21 MMOL/L (ref 21–32)
CREAT SERPL-MCNC: 0.69 MG/DL (ref 0.6–1.3)
EOSINOPHIL # BLD AUTO: 0 THOUSAND/ÂΜL (ref 0–0.61)
EOSINOPHIL NFR BLD AUTO: 0 % (ref 0–6)
ERYTHROCYTE [DISTWIDTH] IN BLOOD BY AUTOMATED COUNT: 16.1 % (ref 11.6–15.1)
FLUAV RNA RESP QL NAA+PROBE: POSITIVE
FLUBV RNA RESP QL NAA+PROBE: NEGATIVE
GFR SERPL CREATININE-BSD FRML MDRD: 106 ML/MIN/1.73SQ M
GLUCOSE SERPL-MCNC: 146 MG/DL (ref 65–140)
HCT VFR BLD AUTO: 37.6 % (ref 34.8–46.1)
HGB BLD-MCNC: 12.1 G/DL (ref 11.5–15.4)
IMM GRANULOCYTES # BLD AUTO: 0.04 THOUSAND/UL (ref 0–0.2)
IMM GRANULOCYTES NFR BLD AUTO: 1 % (ref 0–2)
LYMPHOCYTES # BLD AUTO: 0.23 THOUSANDS/ÂΜL (ref 0.6–4.47)
LYMPHOCYTES NFR BLD AUTO: 3 % (ref 14–44)
MCH RBC QN AUTO: 26.3 PG (ref 26.8–34.3)
MCHC RBC AUTO-ENTMCNC: 32.2 G/DL (ref 31.4–37.4)
MCV RBC AUTO: 82 FL (ref 82–98)
MONOCYTES # BLD AUTO: 0.31 THOUSAND/ÂΜL (ref 0.17–1.22)
MONOCYTES NFR BLD AUTO: 4 % (ref 4–12)
NEUTROPHILS # BLD AUTO: 7.52 THOUSANDS/ÂΜL (ref 1.85–7.62)
NEUTS SEG NFR BLD AUTO: 92 % (ref 43–75)
NRBC BLD AUTO-RTO: 0 /100 WBCS
PLATELET # BLD AUTO: 154 THOUSANDS/UL (ref 149–390)
PMV BLD AUTO: 11 FL (ref 8.9–12.7)
POTASSIUM SERPL-SCNC: 3.8 MMOL/L (ref 3.5–5.3)
RBC # BLD AUTO: 4.6 MILLION/UL (ref 3.81–5.12)
RSV RNA RESP QL NAA+PROBE: NEGATIVE
SARS-COV-2 RNA RESP QL NAA+PROBE: NEGATIVE
SODIUM SERPL-SCNC: 138 MMOL/L (ref 135–147)
WBC # BLD AUTO: 8.11 THOUSAND/UL (ref 4.31–10.16)

## 2022-12-18 RX ORDER — KETOROLAC TROMETHAMINE 30 MG/ML
15 INJECTION, SOLUTION INTRAMUSCULAR; INTRAVENOUS ONCE
Status: COMPLETED | OUTPATIENT
Start: 2022-12-18 | End: 2022-12-18

## 2022-12-18 RX ORDER — ONDANSETRON 4 MG/1
4 TABLET, ORALLY DISINTEGRATING ORAL EVERY 8 HOURS PRN
Qty: 15 TABLET | Refills: 0 | Status: SHIPPED | OUTPATIENT
Start: 2022-12-18 | End: 2022-12-23

## 2022-12-18 RX ORDER — BENZONATATE 100 MG/1
100 CAPSULE ORAL ONCE
Status: COMPLETED | OUTPATIENT
Start: 2022-12-18 | End: 2022-12-18

## 2022-12-18 RX ORDER — ACETAMINOPHEN 325 MG/1
975 TABLET ORAL ONCE
Status: COMPLETED | OUTPATIENT
Start: 2022-12-18 | End: 2022-12-18

## 2022-12-18 RX ORDER — BENZONATATE 100 MG/1
100 CAPSULE ORAL EVERY 8 HOURS
Qty: 15 CAPSULE | Refills: 0 | Status: SHIPPED | OUTPATIENT
Start: 2022-12-18 | End: 2022-12-23

## 2022-12-18 RX ORDER — ONDANSETRON 2 MG/ML
4 INJECTION INTRAMUSCULAR; INTRAVENOUS ONCE
Status: COMPLETED | OUTPATIENT
Start: 2022-12-18 | End: 2022-12-18

## 2022-12-18 RX ADMIN — ONDANSETRON 4 MG: 2 INJECTION INTRAMUSCULAR; INTRAVENOUS at 13:21

## 2022-12-18 RX ADMIN — KETOROLAC TROMETHAMINE 15 MG: 30 INJECTION, SOLUTION INTRAMUSCULAR; INTRAVENOUS at 13:26

## 2022-12-18 RX ADMIN — BENZONATATE 100 MG: 100 CAPSULE ORAL at 13:21

## 2022-12-18 RX ADMIN — SODIUM CHLORIDE 1000 ML: 0.9 INJECTION, SOLUTION INTRAVENOUS at 13:20

## 2022-12-18 RX ADMIN — ACETAMINOPHEN 975 MG: 325 TABLET ORAL at 13:20

## 2022-12-18 NOTE — DISCHARGE INSTRUCTIONS
Please alternate ibuprofen and Tylenol as needed for fever  Please do not exceed daily limits on packaging

## 2022-12-18 NOTE — Clinical Note
Theresa Rodas was seen and treated in our emergency department on 12/18/2022  Diagnosis:     Dayna Garcia  may return to work on return date  She may return on this date: 12/26/2022         If you have any questions or concerns, please don't hesitate to call        Inder Sunshine MD    ______________________________           _______________          _______________  [facility]                              Date                                Time

## 2022-12-18 NOTE — ED PROVIDER NOTES
History  Chief Complaint   Patient presents with   • Flu Symptoms     Pt reports nausea, vomiting, diarrhea, and severe body aches  Started yesterday  HPI     Patient is an otherwise healthy 39-year-old female that presents to the emergency department for evaluation of nausea, vomiting, diarrhea, body aches, cough that started yesterday  She states she took TheraFlu yesterday  Denies any additional medications besides TheraFlu this morning  Denies any focal abdominal pain  Reports nonproductive cough  Reports body aches  No known sick contacts  Prior to Admission Medications   Prescriptions Last Dose Informant Patient Reported? Taking?    Lidocaine Viscous HCl (XYLOCAINE) 2 % mucosal solution   No No   Sig: Swish and spit 15 mL 4 (four) times a day as needed for mouth pain or discomfort   Patient not taking: Reported on 4/22/2021   TiZANidine (ZANAFLEX) 4 MG capsule   No No   Sig: Take 1 capsule (4 mg total) by mouth 3 (three) times a day   Patient not taking: Reported on 3/23/2022    albuterol (Ventolin HFA) 90 mcg/act inhaler   No No   Sig: Inhale 1-2 puffs every 6 (six) hours as needed for wheezing   benzonatate (TESSALON PERLES) 100 mg capsule   No No   Sig: Take 1 capsule (100 mg total) by mouth every 8 (eight) hours   Patient not taking: Reported on 10/11/2021   busPIRone (BUSPAR) 7 5 mg tablet   No No   Sig: Take 1 tablet (7 5 mg total) by mouth 2 (two) times a day   Patient not taking: Reported on 3/23/2022    loratadine (CLARITIN) 10 mg tablet   No No   Sig: Take 1 tablet (10 mg total) by mouth daily   Patient not taking: Reported on 1/28/2021   valACYclovir (VALTREX) 500 mg tablet   No No   Sig: Take 1 tablet (500 mg total) by mouth 2 (two) times a day for 3 days PRN outbreak      Facility-Administered Medications: None       Past Medical History:   Diagnosis Date   • Acute non-recurrent maxillary sinusitis 1/27/2020   • Encounter for consultation 8/18/2020   • Exposure to COVID-19 virus 11/24/2020   • Ovarian cyst    • Psychiatric disorder    • Upper respiratory tract infection 10/18/2019       Past Surgical History:   Procedure Laterality Date   • APPENDECTOMY     • TUBAL LIGATION         Family History   Problem Relation Age of Onset   • Depression Family    • Diabetes Family    • Hypertension Family    • Schizophrenia Family    • No Known Problems Mother    • No Known Problems Father    • No Known Problems Sister    • No Known Problems Daughter    • No Known Problems Maternal Grandmother    • No Known Problems Maternal Grandfather    • No Known Problems Paternal Grandmother    • No Known Problems Paternal Grandfather    • No Known Problems Son    • No Known Problems Son    • No Known Problems Son    • No Known Problems Paternal Aunt      I have reviewed and agree with the history as documented  E-Cigarette/Vaping   • E-Cigarette Use Never User      E-Cigarette/Vaping Substances   • Nicotine No    • THC No    • CBD No    • Flavoring No    • Other No    • Unknown No      Social History     Tobacco Use   • Smoking status: Every Day     Packs/day: 0 50     Years: 22 00     Pack years: 11 00     Types: Cigarettes   • Smokeless tobacco: Never   Vaping Use   • Vaping Use: Never used   Substance Use Topics   • Alcohol use: Yes     Comment: 1 x a year   • Drug use: No       Review of Systems   Constitutional: Positive for fever  Gastrointestinal: Positive for diarrhea, nausea and vomiting  All other systems reviewed and are negative  Physical Exam  Physical Exam  Vitals and nursing note reviewed  Constitutional:       General: She is not in acute distress  Appearance: She is well-developed  HENT:      Head: Normocephalic and atraumatic  Eyes:      Pupils: Pupils are equal, round, and reactive to light  Cardiovascular:      Rate and Rhythm: Regular rhythm  Tachycardia present  Heart sounds: Normal heart sounds  No murmur heard    Pulmonary:      Effort: Pulmonary effort is normal  No respiratory distress  Breath sounds: Normal breath sounds  No wheezing or rales  Abdominal:      General: Bowel sounds are normal  There is no distension  Palpations: Abdomen is soft  Tenderness: There is no abdominal tenderness  There is no guarding or rebound  Musculoskeletal:         General: No deformity  Normal range of motion  Cervical back: Normal range of motion and neck supple  Lymphadenopathy:      Cervical: No cervical adenopathy  Skin:     Capillary Refill: Capillary refill takes less than 2 seconds  Findings: No erythema or rash  Neurological:      Mental Status: She is alert and oriented to person, place, and time  Cranial Nerves: No cranial nerve deficit  Motor: No abnormal muscle tone        Coordination: Coordination normal    Psychiatric:         Behavior: Behavior normal          Vital Signs  ED Triage Vitals [12/18/22 1258]   Temperature Pulse Respirations Blood Pressure SpO2   100 5 °F (38 1 °C) (!) 125 22 142/65 100 %      Temp Source Heart Rate Source Patient Position - Orthostatic VS BP Location FiO2 (%)   Oral Monitor Lying Right arm --      Pain Score       --           Vitals:    12/18/22 1258   BP: 142/65   Pulse: (!) 125   Patient Position - Orthostatic VS: Lying         Visual Acuity      ED Medications  Medications   sodium chloride 0 9 % bolus 1,000 mL (1,000 mL Intravenous New Bag 12/18/22 1320)   ketorolac (TORADOL) injection 15 mg (15 mg Intravenous Given 12/18/22 1326)   ondansetron (ZOFRAN) injection 4 mg (4 mg Intravenous Given 12/18/22 1321)   benzonatate (TESSALON PERLES) capsule 100 mg (100 mg Oral Given 12/18/22 1321)   acetaminophen (TYLENOL) tablet 975 mg (975 mg Oral Given 12/18/22 1320)       Diagnostic Studies  Results Reviewed     Procedure Component Value Units Date/Time    CBC and differential [[id number]]  (Abnormal) Collected: 12/18/22 1319    Lab Status: Final result Specimen: Blood from Arm, Right Updated: 12/18/22 1402     WBC 8 11 Thousand/uL      RBC 4 60 Million/uL      Hemoglobin 12 1 g/dL      Hematocrit 37 6 %      MCV 82 fL      MCH 26 3 pg      MCHC 32 2 g/dL      RDW 16 1 %      MPV 11 0 fL      Platelets 138 Thousands/uL      nRBC 0 /100 WBCs      Neutrophils Relative 92 %      Immat GRANS % 1 %      Lymphocytes Relative 3 %      Monocytes Relative 4 %      Eosinophils Relative 0 %      Basophils Relative 0 %      Neutrophils Absolute 7 52 Thousands/µL      Immature Grans Absolute 0 04 Thousand/uL      Lymphocytes Absolute 0 23 Thousands/µL      Monocytes Absolute 0 31 Thousand/µL      Eosinophils Absolute 0 00 Thousand/µL      Basophils Absolute 0 01 Thousands/µL     Narrative: This is an appended report  These results have been appended to a previously verified report  FLU/RSV/COVID - if FLU/RSV clinically relevant [[id number]]  (Abnormal) Collected: 12/18/22 1306    Lab Status: Final result Specimen: Nares from Nasopharyngeal Swab Updated: 12/18/22 1357     SARS-CoV-2 Negative     INFLUENZA A PCR Positive     INFLUENZA B PCR Negative     RSV PCR Negative    Narrative:      FOR PEDIATRIC PATIENTS - copy/paste COVID Guidelines URL to browser: [email or web address] [email or web address]  [employer]    SARS-CoV-2 assay is a Nucleic Acid Amplification assay intended for the  qualitative detection of nucleic acid from SARS-CoV-2 in nasopharyngeal  swabs  Results are for the presumptive identification of SARS-CoV-2 RNA  Positive results are indicative of infection with SARS-CoV-2, the virus  causing COVID-19, but do not rule out bacterial infection or co-infection  with other viruses  Laboratories within the [place] and its  territories are required to report all positive results to the appropriate  public health authorities  Negative results do not preclude SARS-CoV-2  infection and should not be used as the sole basis for treatment or other  patient management decisions  Negative results must be combined with  clinical observations, patient history, and epidemiological information  This test has not been FDA cleared or approved  This test has been authorized by FDA under an Emergency Use Authorization  (EUA)  This test is only authorized for the duration of time the  declaration that circumstances exist justifying the authorization of the  emergency use of an in vitro diagnostic tests for detection of SARS-CoV-2  virus and/or diagnosis of COVID-19 infection under section 564(b)(1) of  the Act, 21 U  S C  041HEW-9(W)(2), unless the authorization is terminated  or revoked sooner  The test has been validated but independent review by FDA  and CLIA is pending  Test performed using [employer] GeneXpert: This RT-PCR assay targets N2,  a region unique to SARS-CoV-2  A conserved region in the E-gene was chosen  for pan-Sarbecovirus detection which includes SARS-CoV-2  According to CMS-2020-01-R, this platform meets the definition of high-throughput technology      Basic metabolic panel [[id number]]  (Abnormal) Collected: 12/18/22 1319    Lab Status: Final result Specimen: Blood from Arm, Right Updated: 12/18/22 1349     Sodium 138 mmol/L      Potassium 3 8 mmol/L      Chloride 112 mmol/L      CO2 21 mmol/L      ANION GAP 5 mmol/L      BUN 7 mg/dL      Creatinine 0 69 mg/dL      Glucose 146 mg/dL      Calcium 10 8 mg/dL      eGFR 106 ml/min/1 73sq m     Narrative:      Nelli guidelines for Chronic Kidney Disease (CKD):   •  Stage 1 with normal or high GFR (GFR > 90 mL/min/1 73 square meters)  •  Stage 2 Mild CKD (GFR = 60-89 mL/min/1 73 square meters)  •  Stage 3A Moderate CKD (GFR = 45-59 mL/min/1 73 square meters)  •  Stage 3B Moderate CKD (GFR = 30-44 mL/min/1 73 square meters)  •  Stage 4 Severe CKD (GFR = 15-29 mL/min/1 73 square meters)  •  Stage 5 End Stage CKD (GFR <15 mL/min/1 73 square meters)  Note: GFR calculation is accurate only with a steady state creatinine                 XR chest 1 view portable   ED Interpretation by Jenny Davis MD (12/18 5273)   No acute cardiopulmonary abnormality identified  Procedures  Procedures         ED Course                                             MDM  Number of Diagnoses or Management Options  Influenza: new and requires workup  Diagnosis management comments: Flulike illness  Will check basic labs, hydrate patient  CXR with no acute cardiopulmonary abnormality per my read    Basic labs unremarkable  Influenza A positive  No high risk features  Feels better after ER treatment  Will discharge with Zofran, Tessalon Perles, instructions to alternate ibuprofen and Tylenol for fever  PCP follow-up, return precaution discussed       Amount and/or Complexity of Data Reviewed  Clinical lab tests: ordered and reviewed  Tests in the radiology section of CPT®: reviewed and ordered  Independent visualization of images, tracings, or specimens: yes    Risk of Complications, Morbidity, and/or Mortality  Presenting problems: high  Diagnostic procedures: moderate  Management options: high    Patient Progress  Patient progress: improved      Disposition  Final diagnoses:   Influenza     Time reflects when diagnosis was documented in both MDM as applicable and the Disposition within this note     Time User Action Codes Description Comment    12/18/2022  2:07 PM Alfonso Skelton Add [J11 1] Influenza       ED Disposition     ED Disposition   Discharge    Condition   Stable    Date/Time   Sun Dec 18, 2022  2:07 PM    Comment   Zora Chamberlain discharge to home/self care                 Follow-up Information     Follow up With Specialties Details Why Contact Info Additional Vinay Reed MD Internal Medicine Go to  As needed [id number]        [facility] Emergency Medicine Go to  If symptoms worsen, As needed [facility]  [place] [address] [facility] [address] [id number] [facility] Emergency Department, [address], [place], [place], [address]          Patient's Medications   Discharge Prescriptions    BENZONATATE (TESSALON PERLES) 100 MG CAPSULE    Take 1 capsule (100 mg total) by mouth every 8 (eight) hours for 5 days       Start Date: 12/18/2022End Date: 12/23/2022       Order Dose: 100 mg       Quantity: 15 capsule    Refills: 0    ONDANSETRON (ZOFRAN-ODT) 4 MG DISINTEGRATING TABLET    Take 1 tablet (4 mg total) by mouth every 8 (eight) hours as needed for nausea or vomiting for up to 5 days       Start Date: 12/18/2022End Date: 12/23/2022       Order Dose: 4 mg       Quantity: 15 tablet    Refills: 0       No discharge procedures on file      PDMP Review     None          ED Provider  Electronically Signed by           Berkley Lackey MD  12/18/22 5561

## 2022-12-27 ENCOUNTER — OFFICE VISIT (OUTPATIENT)
Dept: URGENT CARE | Age: 42
End: 2022-12-27

## 2022-12-27 ENCOUNTER — TELEMEDICINE (OUTPATIENT)
Dept: INTERNAL MEDICINE CLINIC | Facility: CLINIC | Age: 42
End: 2022-12-27

## 2022-12-27 VITALS — TEMPERATURE: 96.7 F | HEART RATE: 97 BPM | RESPIRATION RATE: 16 BRPM | OXYGEN SATURATION: 99 %

## 2022-12-27 DIAGNOSIS — J06.9 VIRAL URI WITH COUGH: ICD-10-CM

## 2022-12-27 DIAGNOSIS — J10.1 INFLUENZA A: Primary | ICD-10-CM

## 2022-12-27 DIAGNOSIS — J02.9 SORE THROAT: ICD-10-CM

## 2022-12-27 DIAGNOSIS — H92.03 OTALGIA OF BOTH EARS: ICD-10-CM

## 2022-12-27 DIAGNOSIS — J20.9 ACUTE BRONCHITIS, UNSPECIFIED ORGANISM: Primary | ICD-10-CM

## 2022-12-27 LAB — S PYO AG THROAT QL: NEGATIVE

## 2022-12-27 RX ORDER — GUAIFENESIN AND CODEINE PHOSPHATE 100; 10 MG/5ML; MG/5ML
5 SOLUTION ORAL
Qty: 70 ML | Refills: 0 | Status: SHIPPED | OUTPATIENT
Start: 2022-12-27

## 2022-12-27 RX ORDER — PREDNISONE 20 MG/1
20 TABLET ORAL 2 TIMES DAILY WITH MEALS
Qty: 10 TABLET | Refills: 0 | Status: SHIPPED | OUTPATIENT
Start: 2022-12-27 | End: 2023-01-01

## 2022-12-27 RX ORDER — ALBUTEROL SULFATE 90 UG/1
1-2 AEROSOL, METERED RESPIRATORY (INHALATION) EVERY 6 HOURS PRN
Qty: 8 G | Refills: 0 | Status: SHIPPED | OUTPATIENT
Start: 2022-12-27

## 2022-12-27 NOTE — ASSESSMENT & PLAN NOTE
Cough from influenza A  Continues to have significatn dry cough keeps her awake at night  Benzonatate does not work  [employer] before without reaction (thought mucinex listed on allergy) will send guaifenecin AC  Also refilled albuterol

## 2022-12-27 NOTE — PROGRESS NOTES
Virtual Regular Visit    Verification of patient location:    Patient is located in the following state in which I hold an active license [place]      Assessment/Plan:    Problem List Items Addressed This Visit        Respiratory    Influenza A - Primary     Cough from influenza A  Continues to have significatn dry cough keeps her awake at night  Benzonatate does not work  [employer] before without reaction (thought mucinex listed on allergy) will send guaifenecin AC  Also refilled albuterol  Relevant Medications    guaifenesin-codeine (GUAIFENESIN AC) 100-10 MG/5ML liquid   Other Visit Diagnoses     Viral URI with cough        Relevant Medications    albuterol (Ventolin HFA) 90 mcg/act inhaler               Reason for visit is   Chief Complaint   Patient presents with   • Cough   • Fever   • Virtual Regular Visit        Encounter provider Judie Cha MD    Provider located at [address] [place] [id number]      Recent Visits  No visits were found meeting these conditions  Showing recent visits within past 7 days and meeting all other requirements  Today's Visits  Date Type Provider Dept   12/27/22 Telemedicine Judie hCa MD [address] [facility] today's visits and meeting all other requirements  Future Appointments  No visits were found meeting these conditions  Showing future appointments within next 150 days and meeting all other requirements       The patient was identified by name and date of birth  Ashok Singh was informed that this is a telemedicine visit and that the visit is being conducted through the [employer]  She agrees to proceed     My office door was closed  No one else was in the room  She acknowledged consent and understanding of privacy and security of the video platform  The patient has agreed to participate and understands they can discontinue the visit at any time      Patient is aware this is a [facility] service  Subjective  Sonya Oscar is a 38 y o  female          HPI   Flu A pos 12/19  Continues to have dry cough  Worse at night  Takes benzonatate  Sore throat  No fever, no sob, no wheezing    Past Medical History:   Diagnosis Date   • Acute non-recurrent maxillary sinusitis 1/27/2020   • Encounter for consultation 8/18/2020   • Exposure to COVID-19 virus 11/24/2020   • Ovarian cyst    • Psychiatric disorder    • Upper respiratory tract infection 10/18/2019       Past Surgical History:   Procedure Laterality Date   • APPENDECTOMY     • TUBAL LIGATION         Current Outpatient Medications   Medication Sig Dispense Refill   • albuterol (Ventolin HFA) 90 mcg/act inhaler Inhale 1-2 puffs every 6 (six) hours as needed for wheezing 8 g 0   • guaifenesin-codeine (GUAIFENESIN AC) 100-10 MG/5ML liquid Take 5 mL by mouth daily at bedtime as needed for cough 70 mL 0   • benzonatate (TESSALON PERLES) 100 mg capsule Take 1 capsule (100 mg total) by mouth every 8 (eight) hours (Patient not taking: Reported on 10/11/2021) 21 capsule 0   • busPIRone (BUSPAR) 7 5 mg tablet Take 1 tablet (7 5 mg total) by mouth 2 (two) times a day (Patient not taking: Reported on 3/23/2022) 60 tablet 5   • Lidocaine Viscous HCl (XYLOCAINE) 2 % mucosal solution Swish and spit 15 mL 4 (four) times a day as needed for mouth pain or discomfort (Patient not taking: Reported on 4/22/2021) 100 mL 0   • loratadine (CLARITIN) 10 mg tablet Take 1 tablet (10 mg total) by mouth daily (Patient not taking: Reported on 1/28/2021)     • ondansetron (ZOFRAN-ODT) 4 mg disintegrating tablet Take 1 tablet (4 mg total) by mouth every 8 (eight) hours as needed for nausea or vomiting for up to 5 days 15 tablet 0   • TiZANidine (ZANAFLEX) 4 MG capsule Take 1 capsule (4 mg total) by mouth 3 (three) times a day (Patient not taking: Reported on 3/23/2022) 21 capsule 0   • valACYclovir (VALTREX) 500 mg tablet Take 1 tablet (500 mg total) by mouth 2 (two) times a day for 3 days PRN outbreak 60 tablet 3     No current facility-administered medications for this visit  Allergies   Allergen Reactions   • Mucinex Cough For Kids [Dextromethorphan-Guaifenesin] Other (See Comments)     Per verbal of pt "It causes body aches"   • Other Allergic Rhinitis       Review of Systems    Video Exam    There were no vitals filed for this visit      Physical Exam     I spent 10 minutes directly with the patient during this visit

## 2022-12-28 NOTE — PROGRESS NOTES
[facility]        NAME: Viji Worthy is a 38 y o  female  : 1980    MRN: [id number]  DATE: 2022  TIME: 8:38 PM    Assessment and Plan   Acute bronchitis, unspecified organism [J20 9]  1  Acute bronchitis, unspecified organism  predniSONE 20 mg tablet      2  Sore throat  POCT rapid strepA      3  Otalgia of both ears              Patient Instructions     Steroids as ordered  Follow up with PCP in 3-5 days  Proceed to  ER if symptoms worsen  Chief Complaint     Chief Complaint   Patient presents with   • Sore Throat     Under jaw pain, bilateral ear pressure, headache, cough, saw PCP today, for 2 days         History of Present Illness       Presenting for evaluation of ear pain, sore throat and worsening cough  Patient states that she recently tested positive for flu on 2022  She states that since then she has been having a dry cough that is worse at night  She states that over the past 3 days she has developed sore throat and ear pain  She states that she has been taking Tessalon and Tylenol with minimal relief  Review of Systems   Review of Systems   Constitutional: Negative for chills and fever  HENT: Positive for ear pain and sore throat  Respiratory: Positive for cough  Negative for shortness of breath  Cardiovascular: Negative for chest pain  Gastrointestinal: Negative for diarrhea, nausea and vomiting  All other systems reviewed and are negative          Current Medications       Current Outpatient Medications:   •  albuterol (Ventolin HFA) 90 mcg/act inhaler, Inhale 1-2 puffs every 6 (six) hours as needed for wheezing, Disp: 8 g, Rfl: 0  •  predniSONE 20 mg tablet, Take 1 tablet (20 mg total) by mouth 2 (two) times a day with meals for 5 days, Disp: 10 tablet, Rfl: 0  •  benzonatate (TESSALON PERLES) 100 mg capsule, Take 1 capsule (100 mg total) by mouth every 8 (eight) hours (Patient not taking: Reported on 10/11/2021), Disp: 21 capsule, Rfl: 0  •  busPIRone (BUSPAR) 7 5 mg tablet, Take 1 tablet (7 5 mg total) by mouth 2 (two) times a day (Patient not taking: Reported on 3/23/2022), Disp: 60 tablet, Rfl: 5  •  guaifenesin-codeine (GUAIFENESIN AC) 100-10 MG/5ML liquid, Take 5 mL by mouth daily at bedtime as needed for cough (Patient not taking: Reported on 12/27/2022), Disp: 70 mL, Rfl: 0  •  Lidocaine Viscous HCl (XYLOCAINE) 2 % mucosal solution, Swish and spit 15 mL 4 (four) times a day as needed for mouth pain or discomfort (Patient not taking: Reported on 4/22/2021), Disp: 100 mL, Rfl: 0  •  loratadine (CLARITIN) 10 mg tablet, Take 1 tablet (10 mg total) by mouth daily (Patient not taking: Reported on 1/28/2021), Disp: , Rfl:   •  ondansetron (ZOFRAN-ODT) 4 mg disintegrating tablet, Take 1 tablet (4 mg total) by mouth every 8 (eight) hours as needed for nausea or vomiting for up to 5 days, Disp: 15 tablet, Rfl: 0  •  TiZANidine (ZANAFLEX) 4 MG capsule, Take 1 capsule (4 mg total) by mouth 3 (three) times a day (Patient not taking: Reported on 3/23/2022), Disp: 21 capsule, Rfl: 0  •  valACYclovir (VALTREX) 500 mg tablet, Take 1 tablet (500 mg total) by mouth 2 (two) times a day for 3 days PRN outbreak, Disp: 60 tablet, Rfl: 3    Current Allergies     Allergies as of 12/27/2022 - Reviewed 12/27/2022   Allergen Reaction Noted   • Mucinex cough for kids [dextromethorphan-guaifenesin] Other (See Comments) 03/23/2022   • Other Allergic Rhinitis 03/18/2013            The following portions of the patient's history were reviewed and updated as appropriate: allergies, current medications, past family history, past medical history, past social history, past surgical history and problem list      Past Medical History:   Diagnosis Date   • Acute non-recurrent maxillary sinusitis 1/27/2020   • Encounter for consultation 8/18/2020   • Exposure to COVID-19 virus 11/24/2020   • Ovarian cyst    • Psychiatric disorder    • Upper respiratory tract infection 10/18/2019 Past Surgical History:   Procedure Laterality Date   • APPENDECTOMY     • TUBAL LIGATION         Family History   Problem Relation Age of Onset   • Depression Family    • Diabetes Family    • Hypertension Family    • Schizophrenia Family    • No Known Problems Mother    • No Known Problems Father    • No Known Problems Sister    • No Known Problems Daughter    • No Known Problems Maternal Grandmother    • No Known Problems Maternal Grandfather    • No Known Problems Paternal Grandmother    • No Known Problems Paternal Grandfather    • No Known Problems Son    • No Known Problems Son    • No Known Problems Son    • No Known Problems Paternal Aunt          Medications have been verified  Objective   Pulse 97   Temp (!) 96 7 °F (35 9 °C)   Resp 16   LMP 12/05/2022 (Exact Date)   SpO2 99%        Physical Exam     Physical Exam  Vitals and nursing note reviewed  Constitutional:       General: She is not in acute distress  Appearance: She is well-developed  She is not ill-appearing, toxic-appearing or diaphoretic  HENT:      Right Ear: Tympanic membrane normal       Left Ear: Tympanic membrane normal       Nose: No congestion or rhinorrhea  Mouth/Throat:      Mouth: Mucous membranes are moist       Pharynx: Oropharynx is clear  Uvula midline  Posterior oropharyngeal erythema present  No pharyngeal swelling  Tonsils: No tonsillar abscesses  1+ on the right  1+ on the left  Eyes:      Conjunctiva/sclera: Conjunctivae normal    Cardiovascular:      Rate and Rhythm: Normal rate and regular rhythm  Heart sounds: Normal heart sounds  No murmur heard  No friction rub  No gallop  Pulmonary:      Effort: Pulmonary effort is normal       Breath sounds: Wheezing (Scattered wheezes upper lobes) present  Skin:     General: Skin is warm and dry  Neurological:      Mental Status: She is alert     Psychiatric:         Mood and Affect: Mood normal          Behavior: Behavior normal

## 2022-12-28 NOTE — PATIENT INSTRUCTIONS
Please take prednisone 2 times daily with meals for the next 5 days  Continue with medications as prescribed by your primary care provider

## 2023-02-25 PROBLEM — J10.1 INFLUENZA A: Status: RESOLVED | Noted: 2022-12-27 | Resolved: 2023-02-25

## 2023-05-01 ENCOUNTER — OFFICE VISIT (OUTPATIENT)
Dept: INTERNAL MEDICINE CLINIC | Facility: CLINIC | Age: 43
End: 2023-05-01

## 2023-05-01 VITALS
DIASTOLIC BLOOD PRESSURE: 84 MMHG | HEART RATE: 93 BPM | WEIGHT: 223.8 LBS | TEMPERATURE: 97.9 F | OXYGEN SATURATION: 100 % | SYSTOLIC BLOOD PRESSURE: 120 MMHG | BODY MASS INDEX: 33.92 KG/M2 | HEIGHT: 68 IN

## 2023-05-01 DIAGNOSIS — E66.09 CLASS 2 OBESITY DUE TO EXCESS CALORIES WITHOUT SERIOUS COMORBIDITY WITH BODY MASS INDEX (BMI) OF 35.0 TO 35.9 IN ADULT: ICD-10-CM

## 2023-05-01 DIAGNOSIS — F41.9 ANXIETY: Primary | ICD-10-CM

## 2023-05-01 DIAGNOSIS — Z12.31 ENCOUNTER FOR SCREENING MAMMOGRAM FOR MALIGNANT NEOPLASM OF BREAST: ICD-10-CM

## 2023-05-01 DIAGNOSIS — Z00.00 ANNUAL PHYSICAL EXAM: ICD-10-CM

## 2023-05-01 DIAGNOSIS — F17.200 SMOKING: ICD-10-CM

## 2023-05-01 RX ORDER — BUPROPION HYDROCHLORIDE 150 MG/1
150 TABLET ORAL 2 TIMES DAILY
Qty: 180 TABLET | Refills: 1 | Status: SHIPPED | OUTPATIENT
Start: 2023-05-01

## 2023-05-01 NOTE — ASSESSMENT & PLAN NOTE
Routine blood works:ordered  Vaccines:recommend a dose of pcv 20 given smoking hx  Pt deferred today  Mammogram:ordered  Cervical Cancer screen:due this year  Has appointment with obgyn this week  Wt loss counseling:dicussed  see under separate problem  Smoking cessation: restarted smoking 1ppd  Interested to quit  Previously wellbutrin worked well  Send wellbutrin  Also did cognitive behavior counseling     Depression screening:neg  Healthy diet and regular exercise

## 2023-05-01 NOTE — ASSESSMENT & PLAN NOTE
Pt would like to be 140-150 ibs ideal wt  She had that wt in her 25s  There are areas to improve on her diet  She eats take out, fast food, snacks and sugary beverage  We discussed low sugar, low carb, low fat diet  We discussed meal prep, healthy snack  Refer to wt management  She is very interested in GLP1  We discussed she needs to show determination with at least 3-6 weeks of life style modification  I will send a Rx to check for insurance coverage

## 2023-05-02 PROBLEM — IMO0001 SMOKING: Status: ACTIVE | Noted: 2023-05-02

## 2023-05-02 PROBLEM — Z71.6 ENCOUNTER FOR SMOKING CESSATION COUNSELING: Status: ACTIVE | Noted: 2023-05-02

## 2023-05-02 PROBLEM — F17.200 SMOKING: Status: ACTIVE | Noted: 2023-05-02

## 2023-05-02 NOTE — PROGRESS NOTES
Name: Sommer Arvizu      : 1980      MRN: [id number]  Encounter Provider: Walter Stafford MD  Encounter Date: 2023   Encounter department: [facility]    Assessment & Plan     1  Anxiety  Assessment & Plan: This is under control  Not on meds  2  Class 2 obesity due to excess calories without serious comorbidity with body mass index (BMI) of 35 0 to 35 9 in adult  Assessment & Plan:  Pt would like to be 140-150 ibs ideal wt  She had that wt in her 25s  There are areas to improve on her diet  She eats take out, fast food, snacks and sugary beverage  We discussed low sugar, low carb, low fat diet  We discussed meal prep, healthy snack  Refer to wt management  She is very interested in GLP1  We discussed she needs to show determination with at least 3-6 weeks of life style modification  I will send a Rx to check for insurance coverage  Orders:  -     Semaglutide-Weight Management (WEGOVY) 0 25 MG/0 5ML; Inject 0 5 mL (0 25 mg total) under the skin once a week  -     Lipid panel; Future  -     Hemoglobin A1C; Future  -     Comprehensive metabolic panel; Future  -     Ambulatory Referral to Weight Management; Future    3  Smoking  -     buPROPion (WELLBUTRIN XL) 150 mg 24 hr tablet; Take 1 tablet (150 mg total) by mouth 2 (two) times a day Started with 1 tab once a day for 3 days, then increase to twice a day  -     Comprehensive metabolic panel; Future    4  Annual physical exam  Assessment & Plan:  Routine blood works:ordered  Vaccines:recommend a dose of pcv 20 given smoking hx  Pt deferred today  Mammogram:ordered  Cervical Cancer screen:due this year  Has appointment with obgyn this week  Wt loss counseling:dicussed  see under separate problem  Smoking cessation: restarted smoking 1ppd  Interested to quit  Previously wellbutrin worked well  Send wellbutrin  Also did cognitive behavior counseling     Depression screening:neg  Healthy diet and regular exercise        5  Encounter for screening mammogram for malignant neoplasm of breast  -     Mammo screening bilateral w 3d & cad; Future; Expected date: 05/01/2023         Subjective      HPI   Here for annual physical and follow up chronic issues  Wants to discuss wt loss  Review of Systems   Constitutional: Negative for chills, fatigue and fever  HENT: Negative for congestion, nosebleeds, postnasal drip, sneezing, sore throat and trouble swallowing  Eyes: Negative for pain  Respiratory: Negative for cough, chest tightness, shortness of breath and wheezing  Cardiovascular: Negative for chest pain, palpitations and leg swelling  Gastrointestinal: Negative for abdominal pain, constipation, diarrhea, nausea and vomiting  Endocrine: Negative for cold intolerance, heat intolerance, polydipsia and polyuria  Genitourinary: Negative for difficulty urinating, dysuria, flank pain and hematuria  Musculoskeletal: Negative for arthralgias and myalgias  Skin: Negative for rash  Neurological: Negative for dizziness, tremors, weakness and headaches  Hematological: Does not bruise/bleed easily  Psychiatric/Behavioral: Negative for confusion and dysphoric mood  The patient is not nervous/anxious          Current Outpatient Medications on File Prior to Visit   Medication Sig    valACYclovir (VALTREX) 500 mg tablet Take 1 tablet (500 mg total) by mouth 2 (two) times a day for 3 days PRN outbreak    albuterol (Ventolin HFA) 90 mcg/act inhaler Inhale 1-2 puffs every 6 (six) hours as needed for wheezing (Patient not taking: Reported on 5/1/2023)    benzonatate (TESSALON PERLES) 100 mg capsule Take 1 capsule (100 mg total) by mouth every 8 (eight) hours (Patient not taking: Reported on 10/11/2021)    busPIRone (BUSPAR) 7 5 mg tablet Take 1 tablet (7 5 mg total) by mouth 2 (two) times a day (Patient not taking: Reported on 3/23/2022)    Lidocaine Viscous HCl (XYLOCAINE) 2 % mucosal solution Swish and spit "15 mL 4 (four) times a day as needed for mouth pain or discomfort (Patient not taking: Reported on 4/22/2021)    loratadine (CLARITIN) 10 mg tablet Take 1 tablet (10 mg total) by mouth daily (Patient not taking: Reported on 5/1/2023)    ondansetron (ZOFRAN-ODT) 4 mg disintegrating tablet Take 1 tablet (4 mg total) by mouth every 8 (eight) hours as needed for nausea or vomiting for up to 5 days (Patient not taking: Reported on 5/1/2023)    TiZANidine (ZANAFLEX) 4 MG capsule Take 1 capsule (4 mg total) by mouth 3 (three) times a day (Patient not taking: Reported on 3/23/2022)       Objective     /84 (BP Location: Left arm, Patient Position: Sitting, Cuff Size: Adult)   Pulse 93   Temp 97 9 °F (36 6 °C) (Probe)   Ht 5' 7 75\" (1 721 m)   Wt 102 kg (223 lb 12 8 oz)   SpO2 100%   BMI 34 28 kg/m²     Physical Exam  Constitutional:       General: She is not in acute distress  Appearance: She is well-developed  HENT:      Head: Normocephalic and atraumatic  Right Ear: External ear normal       Left Ear: External ear normal    Eyes:      General: No scleral icterus  Conjunctiva/sclera: Conjunctivae normal    Neck:      Thyroid: No thyromegaly  Trachea: No tracheal deviation  Cardiovascular:      Rate and Rhythm: Normal rate and regular rhythm  Heart sounds: Normal heart sounds  Pulmonary:      Effort: Pulmonary effort is normal  No respiratory distress  Breath sounds: Normal breath sounds  No wheezing or rales  Abdominal:      General: Bowel sounds are normal       Palpations: Abdomen is soft  Tenderness: There is no abdominal tenderness  There is no guarding or rebound  Musculoskeletal:      Cervical back: Normal range of motion and neck supple  Lymphadenopathy:      Cervical: No cervical adenopathy  Neurological:      Mental Status: She is alert and oriented to person, place, and time     Psychiatric:         Behavior: Behavior normal          Thought Content: " Thought content normal          Judgment: Judgment normal        Ronelle Field, MD

## 2023-05-23 DIAGNOSIS — E83.52 HYPERCALCEMIA: Primary | ICD-10-CM

## 2023-05-24 LAB
ALBUMIN SERPL-MCNC: 3.9 G/DL (ref 3.6–5.1)
ALBUMIN/GLOB SERPL: 1.7 (CALC) (ref 1–2.5)
ALP SERPL-CCNC: 126 U/L (ref 31–125)
ALT SERPL-CCNC: 19 U/L (ref 6–29)
AST SERPL-CCNC: 12 U/L (ref 10–30)
BILIRUB SERPL-MCNC: 0.3 MG/DL (ref 0.2–1.2)
BUN SERPL-MCNC: 8 MG/DL (ref 7–25)
BUN/CREAT SERPL: ABNORMAL (CALC) (ref 6–22)
CALCIUM SERPL-MCNC: 10.6 MG/DL (ref 8.6–10.2)
CHLORIDE SERPL-SCNC: 112 MMOL/L (ref 98–110)
CHOLEST SERPL-MCNC: 196 MG/DL
CHOLEST/HDLC SERPL: 4.9 (CALC)
CO2 SERPL-SCNC: 27 MMOL/L (ref 20–32)
CREAT SERPL-MCNC: 0.67 MG/DL (ref 0.5–0.99)
GFR/BSA.PRED SERPLBLD CYS-BASED-ARV: 110 ML/MIN/1.73M2
GLOBULIN SER CALC-MCNC: 2.3 G/DL (CALC) (ref 1.9–3.7)
GLUCOSE SERPL-MCNC: 86 MG/DL (ref 65–99)
HBA1C MFR BLD: 5.3 % OF TOTAL HGB
HDLC SERPL-MCNC: 40 MG/DL
LDLC SERPL CALC-MCNC: 135 MG/DL (CALC)
NONHDLC SERPL-MCNC: 156 MG/DL (CALC)
POTASSIUM SERPL-SCNC: 4.1 MMOL/L (ref 3.5–5.3)
PROT SERPL-MCNC: 6.2 G/DL (ref 6.1–8.1)
SODIUM SERPL-SCNC: 141 MMOL/L (ref 135–146)
TRIGL SERPL-MCNC: 99 MG/DL

## 2023-07-31 ENCOUNTER — RA CDI HCC (OUTPATIENT)
Dept: OTHER | Facility: HOSPITAL | Age: 43
End: 2023-07-31

## 2023-09-21 ENCOUNTER — APPOINTMENT (EMERGENCY)
Dept: RADIOLOGY | Facility: HOSPITAL | Age: 43
End: 2023-09-21
Payer: COMMERCIAL

## 2023-09-21 ENCOUNTER — HOSPITAL ENCOUNTER (EMERGENCY)
Facility: HOSPITAL | Age: 43
Discharge: HOME | End: 2023-09-21
Attending: EMERGENCY MEDICINE
Payer: COMMERCIAL

## 2023-09-21 VITALS
TEMPERATURE: 98.4 F | HEART RATE: 97 BPM | SYSTOLIC BLOOD PRESSURE: 135 MMHG | RESPIRATION RATE: 20 BRPM | DIASTOLIC BLOOD PRESSURE: 68 MMHG | OXYGEN SATURATION: 100 %

## 2023-09-21 DIAGNOSIS — J20.9 ACUTE WHEEZY BRONCHITIS: Primary | ICD-10-CM

## 2023-09-21 LAB
ALBUMIN SERPL BCP-MCNC: 4.1 G/DL (ref 3.5–5)
ALP SERPL-CCNC: 106 U/L (ref 34–104)
ALT SERPL W P-5'-P-CCNC: 25 U/L (ref 7–52)
ANION GAP SERPL CALCULATED.3IONS-SCNC: 5 MMOL/L
AST SERPL W P-5'-P-CCNC: 15 U/L (ref 13–39)
ATRIAL RATE: 101 BPM
BASOPHILS # BLD AUTO: 0.01 THOUSANDS/ÂΜL (ref 0–0.1)
BASOPHILS NFR BLD AUTO: 0 % (ref 0–1)
BILIRUB SERPL-MCNC: 0.44 MG/DL (ref 0.2–1)
BUN SERPL-MCNC: 8 MG/DL (ref 5–25)
CALCIUM SERPL-MCNC: 10.8 MG/DL (ref 8.4–10.2)
CARDIAC TROPONIN I PNL SERPL HS: 2 NG/L
CHLORIDE SERPL-SCNC: 111 MMOL/L (ref 96–108)
CO2 SERPL-SCNC: 24 MMOL/L (ref 21–32)
CREAT SERPL-MCNC: 0.7 MG/DL (ref 0.6–1.3)
D DIMER PPP FEU-MCNC: <0.27 UG/ML FEU
EOSINOPHIL # BLD AUTO: 0.08 THOUSAND/ÂΜL (ref 0–0.61)
EOSINOPHIL NFR BLD AUTO: 2 % (ref 0–6)
ERYTHROCYTE [DISTWIDTH] IN BLOOD BY AUTOMATED COUNT: 15.9 % (ref 11.6–15.1)
FLUAV RNA RESP QL NAA+PROBE: NEGATIVE
FLUBV RNA RESP QL NAA+PROBE: NEGATIVE
GFR SERPL CREATININE-BSD FRML MDRD: 104 ML/MIN/1.73SQ M
GLUCOSE SERPL-MCNC: 89 MG/DL (ref 65–140)
HCT VFR BLD AUTO: 39.4 % (ref 34.8–46.1)
HGB BLD-MCNC: 13 G/DL (ref 11.5–15.4)
IMM GRANULOCYTES # BLD AUTO: 0.01 THOUSAND/UL (ref 0–0.2)
IMM GRANULOCYTES NFR BLD AUTO: 0 % (ref 0–2)
LYMPHOCYTES # BLD AUTO: 0.97 THOUSANDS/ÂΜL (ref 0.6–4.47)
LYMPHOCYTES NFR BLD AUTO: 24 % (ref 14–44)
MCH RBC QN AUTO: 26.7 PG (ref 26.8–34.3)
MCHC RBC AUTO-ENTMCNC: 33 G/DL (ref 31.4–37.4)
MCV RBC AUTO: 81 FL (ref 82–98)
MONOCYTES # BLD AUTO: 0.33 THOUSAND/ÂΜL (ref 0.17–1.22)
MONOCYTES NFR BLD AUTO: 8 % (ref 4–12)
NEUTROPHILS # BLD AUTO: 2.66 THOUSANDS/ÂΜL (ref 1.85–7.62)
NEUTS SEG NFR BLD AUTO: 66 % (ref 43–75)
NRBC BLD AUTO-RTO: 0 /100 WBCS
P AXIS: 41 DEGREES
PLATELET # BLD AUTO: 162 THOUSANDS/UL (ref 149–390)
PMV BLD AUTO: 12.6 FL (ref 8.9–12.7)
POTASSIUM SERPL-SCNC: 3.9 MMOL/L (ref 3.5–5.3)
PR INTERVAL: 160 MS
PROT SERPL-MCNC: 6.9 G/DL (ref 6.4–8.4)
QRS AXIS: 52 DEGREES
QRSD INTERVAL: 86 MS
QT INTERVAL: 316 MS
QTC INTERVAL: 409 MS
RBC # BLD AUTO: 4.86 MILLION/UL (ref 3.81–5.12)
RSV RNA RESP QL NAA+PROBE: NEGATIVE
SARS-COV-2 RNA RESP QL NAA+PROBE: NEGATIVE
SODIUM SERPL-SCNC: 140 MMOL/L (ref 135–147)
T WAVE AXIS: 6 DEGREES
VENTRICULAR RATE: 101 BPM
WBC # BLD AUTO: 4.06 THOUSAND/UL (ref 4.31–10.16)

## 2023-09-21 PROCEDURE — 0241U HB NFCT DS VIR RESP RNA 4 TRGT: CPT

## 2023-09-21 PROCEDURE — 85025 COMPLETE CBC W/AUTO DIFF WBC: CPT

## 2023-09-21 PROCEDURE — 71045 X-RAY EXAM CHEST 1 VIEW: CPT

## 2023-09-21 PROCEDURE — 36415 COLL VENOUS BLD VENIPUNCTURE: CPT

## 2023-09-21 PROCEDURE — 93005 ELECTROCARDIOGRAM TRACING: CPT

## 2023-09-21 PROCEDURE — 80053 COMPREHEN METABOLIC PANEL: CPT

## 2023-09-21 PROCEDURE — 94640 AIRWAY INHALATION TREATMENT: CPT

## 2023-09-21 PROCEDURE — 99284 EMERGENCY DEPT VISIT MOD MDM: CPT

## 2023-09-21 PROCEDURE — 99285 EMERGENCY DEPT VISIT HI MDM: CPT | Performed by: EMERGENCY MEDICINE

## 2023-09-21 PROCEDURE — 84484 ASSAY OF TROPONIN QUANT: CPT

## 2023-09-21 PROCEDURE — 85379 FIBRIN DEGRADATION QUANT: CPT

## 2023-09-21 RX ORDER — PREDNISONE 10 MG/1
40 TABLET ORAL DAILY
Qty: 20 TABLET | Refills: 0 | Status: SHIPPED | OUTPATIENT
Start: 2023-09-21 | End: 2023-09-26

## 2023-09-21 RX ORDER — PREDNISONE 20 MG/1
60 TABLET ORAL ONCE
Status: COMPLETED | OUTPATIENT
Start: 2023-09-21 | End: 2023-09-21

## 2023-09-21 RX ORDER — ALBUTEROL SULFATE 2.5 MG/3ML
2.5 SOLUTION RESPIRATORY (INHALATION) ONCE
Status: COMPLETED | OUTPATIENT
Start: 2023-09-21 | End: 2023-09-21

## 2023-09-21 RX ORDER — ALBUTEROL SULFATE 90 UG/1
2 AEROSOL, METERED RESPIRATORY (INHALATION) EVERY 6 HOURS PRN
Qty: 6.7 G | Refills: 0 | Status: SHIPPED | OUTPATIENT
Start: 2023-09-21

## 2023-09-21 RX ORDER — BENZONATATE 100 MG/1
100 CAPSULE ORAL ONCE
Status: COMPLETED | OUTPATIENT
Start: 2023-09-21 | End: 2023-09-21

## 2023-09-21 RX ORDER — ACETAMINOPHEN 325 MG/1
650 TABLET ORAL ONCE
Status: COMPLETED | OUTPATIENT
Start: 2023-09-21 | End: 2023-09-21

## 2023-09-21 RX ADMIN — ALBUTEROL SULFATE 2.5 MG: 2.5 SOLUTION RESPIRATORY (INHALATION) at 19:46

## 2023-09-21 RX ADMIN — ACETAMINOPHEN 650 MG: 325 TABLET, FILM COATED ORAL at 19:11

## 2023-09-21 RX ADMIN — BENZONATATE 100 MG: 100 CAPSULE ORAL at 19:11

## 2023-09-21 RX ADMIN — PREDNISONE 60 MG: 20 TABLET ORAL at 21:02

## 2023-09-21 NOTE — ED ATTENDING ATTESTATION
9/21/2023  I, Beth Elizondo, DO, saw and evaluated the patient. I have discussed the patient with the resident/non-physician practitioner and agree with the resident's/non-physician practitioner's findings, Plan of Care, and MDM as documented in the resident's/non-physician practitioner's note, except where noted. All available labs and Radiology studies were reviewed. I was present for key portions of any procedure(s) performed by the resident/non-physician practitioner and I was immediately available to provide assistance. At this point I agree with the current assessment done in the Emergency Department. I have conducted an independent evaluation of this patient a history and physical is as follows:    ED Course   37year old female presents with 4 days of cough and congestion. Cough worsening - today patient had a coughing spell and had trouble catching her breath. Patient reports posttussive emesis. Patient states that she had cold symptoms earlier in the week and felt like she was improving however her coughing has worsened patient notes some blood-tinged sputum. .  Patient states that she has been coughing so hard that she feels like she is going to pass out. She has tried over-the-counter medications without relief. She denies associated nausea, vomiting, diarrhea    Past medical history: Depression    Physical exam: Patient is awake and alert, no acute distress. Nasal congestion noted. Pharynx without exudate. Neck is supple. Frequent hacking cough noted. Heart is tachycardic without murmur. Lungs with few wheezes and scattered rhonchi. Abdomen is soft, nontender, nondistended with normal active bowel sounds. No lower extremity edema. No calf tenderness. Assessment: 61-year-old female presents with cough, congestion, scant hemoptysis. Frontal diagnosis includes but is not limited to acute wheezy bronchitis, pneumonia, pulmonary embolism, pleural effusion, cardiac dysrhythmia.     Plan: We will check EKG, chest x-ray, will check D-dimer due to tachycardia hemoptysis.   We will trial bronchodilator and reassess      Critical Care Time  Procedures

## 2023-09-21 NOTE — Clinical Note
Dario Dejesus was seen and treated in our emergency department on 9/21/2023. Diagnosis:     Paul tSrong  may return to school on return date. She may return on this date: 09/25/2023         If you have any questions or concerns, please don't hesitate to call.       Bill Ward MD    ______________________________           _______________          _______________  Hospital Representative                              Date                                Time

## 2023-09-21 NOTE — Clinical Note
Dario Dejesus was seen and treated in our emergency department on 9/21/2023. Diagnosis:     Paul Strong  may return to school on return date. She may return on this date: 09/25/2023         If you have any questions or concerns, please don't hesitate to call.       Bill Ward MD    ______________________________           _______________          _______________  Hospital Representative                              Date                                Time

## 2023-09-21 NOTE — ED PROVIDER NOTES
History  Chief Complaint   Patient presents with   • Cough     Pt states she has had a cough since Saturday. States she coughed so hard today that she thinks she passed out. Took a [facility] home test Tuesday that was negative. States she is vomiting from force of cough. The patient is a 49-year-old female with no relevant past medical history who presents to the emergency department with a complaint of severe cough. She reports her cough started 5 days ago and she has also been complaining of congestion and runny nose. She states that it began to improve but then 2 days ago started to worsen again. She states that she has had episodes of coughing so severe that she has vomited in prior to coming to the emergency department she had 1 episode of vomiting that was so severe that she nearly passed out. She reports mild chest pain with coughing as well. She denies any recent sick contacts. She states she took a COVID test at home 2 days ago that was negative. She has attempted taking Mucinex, Sudafed and halls without relief. She denies headache, change in vision, shortness of breath, abdominal pain, nausea, diarrhea, numbness, tingling, weakness, hematuria, dysuria, rash or fever. Prior to Admission Medications   Prescriptions Last Dose Informant Patient Reported? Taking?    buPROPion (WELLBUTRIN XL) 150 mg 24 hr tablet   No No   Sig: Take 1 tablet (150 mg total) by mouth 2 (two) times a day Started with 1 tab once a day for 3 days, then increase to twice a day   valACYclovir (VALTREX) 500 mg tablet   No No   Sig: Take 1 tablet (500 mg total) by mouth 2 (two) times a day for 3 days PRN outbreak      Facility-Administered Medications: None       Past Medical History:   Diagnosis Date   • Acute non-recurrent maxillary sinusitis 1/27/2020   • Encounter for consultation 8/18/2020   • Exposure to COVID-19 virus 11/24/2020   • Ovarian cyst    • Psychiatric disorder    • Upper respiratory tract infection 10/18/2019       Past Surgical History:   Procedure Laterality Date   • APPENDECTOMY     • TUBAL LIGATION         Family History   Problem Relation Age of Onset   • Depression Family    • Diabetes Family    • Hypertension Family    • Schizophrenia Family    • No Known Problems Mother    • No Known Problems Father    • No Known Problems Sister    • No Known Problems Daughter    • No Known Problems Maternal Grandmother    • No Known Problems Maternal Grandfather    • No Known Problems Paternal Grandmother    • No Known Problems Paternal Grandfather    • No Known Problems Son    • No Known Problems Son    • No Known Problems Son    • No Known Problems Paternal Aunt      I have reviewed and agree with the history as documented. E-Cigarette/Vaping   • E-Cigarette Use Never User      E-Cigarette/Vaping Substances   • Nicotine No    • THC No    • CBD No    • Flavoring No      Social History     Tobacco Use   • Smoking status: Every Day     Packs/day: 0.50     Years: 22.00     Total pack years: 11.00     Types: Cigarettes   • Smokeless tobacco: Never   Vaping Use   • Vaping Use: Never used   Substance Use Topics   • Alcohol use: Yes     Comment: 1 x a year   • Drug use: No        Review of Systems   Constitutional: Negative for chills, fatigue and fever. HENT: Negative for ear pain and sore throat. Eyes: Negative for photophobia, pain and visual disturbance. Respiratory: Positive for cough and wheezing. Negative for shortness of breath and stridor. Cardiovascular: Positive for chest pain. Negative for palpitations and leg swelling. Gastrointestinal: Positive for vomiting. Negative for abdominal distention, abdominal pain, constipation, diarrhea and nausea. Endocrine: Negative. Genitourinary: Negative for dysuria and hematuria. Musculoskeletal: Negative for arthralgias, back pain, neck pain and neck stiffness. Skin: Negative for color change and rash. Allergic/Immunologic: Negative.     Neurological: Positive for light-headedness. Negative for dizziness, seizures, syncope, weakness, numbness and headaches. Hematological: Negative. Psychiatric/Behavioral: Negative. All other systems reviewed and are negative. Physical Exam  ED Triage Vitals [09/21/23 1803]   Temperature Pulse Respirations Blood Pressure SpO2   98.4 °F (36.9 °C) (!) 106 20 139/87 100 %      Temp Source Heart Rate Source Patient Position - Orthostatic VS BP Location FiO2 (%)   Oral Monitor Sitting Right arm --      Pain Score       8             Orthostatic Vital Signs  Vitals:    09/21/23 1803   BP: 139/87   Pulse: (!) 106   Patient Position - Orthostatic VS: Sitting       Physical Exam  Vitals and nursing note reviewed. Constitutional:       General: She is in acute distress. Appearance: She is well-developed. She is obese. She is ill-appearing. HENT:      Head: Normocephalic and atraumatic. Nose: Congestion present. Mouth/Throat:      Mouth: Mucous membranes are moist.      Pharynx: Oropharynx is clear. Eyes:      Extraocular Movements: Extraocular movements intact. Conjunctiva/sclera: Conjunctivae normal.      Pupils: Pupils are equal, round, and reactive to light. Cardiovascular:      Rate and Rhythm: Regular rhythm. Tachycardia present. Pulses: Normal pulses. Heart sounds: Normal heart sounds. No murmur heard. No friction rub. Pulmonary:      Effort: Pulmonary effort is normal. No respiratory distress. Breath sounds: No stridor. Wheezing present. No rhonchi or rales. Abdominal:      General: Abdomen is flat. Bowel sounds are normal. There is no distension. Palpations: Abdomen is soft. Tenderness: There is no abdominal tenderness. There is no right CVA tenderness, left CVA tenderness, guarding or rebound. Musculoskeletal:         General: No swelling or tenderness. Normal range of motion. Cervical back: Normal range of motion and neck supple.  No rigidity or tenderness. Right lower leg: No edema. Left lower leg: No edema. Lymphadenopathy:      Cervical: No cervical adenopathy. Skin:     General: Skin is warm and dry. Capillary Refill: Capillary refill takes less than 2 seconds. Coloration: Skin is not jaundiced or pale. Findings: No erythema or rash. Neurological:      General: No focal deficit present. Mental Status: She is alert and oriented to person, place, and time. Mental status is at baseline. Cranial Nerves: No cranial nerve deficit. Sensory: No sensory deficit. Motor: No weakness. Psychiatric:         Mood and Affect: Mood normal.         ED Medications  Medications   benzonatate (TESSALON PERLES) capsule 100 mg (100 mg Oral Given 9/21/23 1911)   acetaminophen (TYLENOL) tablet 650 mg (650 mg Oral Given 9/21/23 1911)   albuterol inhalation solution 2.5 mg (2.5 mg Nebulization Given 9/21/23 1946)       Diagnostic Studies  Results Reviewed     Procedure Component Value Units Date/Time    FLU/RSV/COVID - if FLU/RSV clinically relevant [[id number]]  (Normal) Collected: 09/21/23 1912    Lab Status: Final result Specimen: Nares from Nose Updated: 09/21/23 1958     SARS-CoV-2 Negative     INFLUENZA A PCR Negative     INFLUENZA B PCR Negative     RSV PCR Negative    Narrative:      FOR PEDIATRIC PATIENTS - copy/paste COVID Guidelines URL to browser: [email or web address]    SARS-CoV-2 assay is a Nucleic Acid Amplification assay intended for the  qualitative detection of nucleic acid from SARS-CoV-2 in nasopharyngeal  swabs. Results are for the presumptive identification of SARS-CoV-2 RNA. Positive results are indicative of infection with SARS-CoV-2, the virus  causing COVID-19, but do not rule out bacterial infection or co-infection  with other viruses.  Laboratories within the [facility] and its  territories are required to report all positive results to the appropriate  public health authorities. Negative results do not preclude SARS-CoV-2  infection and should not be used as the sole basis for treatment or other  patient management decisions. Negative results must be combined with  clinical observations, patient history, and epidemiological information. This test has not been FDA cleared or approved. This test has been authorized by FDA under an Emergency Use Authorization  (EUA). This test is only authorized for the duration of time the  declaration that circumstances exist justifying the authorization of the  emergency use of an in vitro diagnostic tests for detection of SARS-CoV-2  virus and/or diagnosis of COVID-19 infection under section 564(b)(1) of  the Act, 21 U. S.C. 320FWT-4(I)(1), unless the authorization is terminated  or revoked sooner. The test has been validated but independent review by FDA  and CLIA is pending. Test performed using Cepheid GeneXpert: This RT-PCR assay targets N2,  a region unique to SARS-CoV-2. A conserved region in the E-gene was chosen  for pan-Sarbecovirus detection which includes SARS-CoV-2. According to CMS-2020-01-R, this platform meets the definition of high-throughput technology.     CBC and differential [[id number]] Collected: 09/21/23 1927    Lab Status: No result Specimen: Blood from Arm, Left     Comprehensive metabolic panel [[id number]] Collected: 09/21/23 1927    Lab Status: No result Specimen: Blood from Arm, Left     HS Troponin 0hr (reflex protocol) [[id number]] Collected: 09/21/23 1927    Lab Status: No result Specimen: Blood from Arm, Left     D-Dimer [[id number]] Collected: 09/21/23 1927    Lab Status: No result Specimen: Blood from Arm, Left                  XR chest 1 view portable    (Results Pending)         Procedures  ECG 12 Lead Documentation Only    Date/Time: 9/21/2023 7:34 PM    Performed by: Odilon Echavarria DO  Authorized by: Odilon Echavarria DO    Indications / Diagnosis:  Cough with chest pain  ECG reviewed by me, the ED Provider: yes    Patient location:  ED  Previous ECG:     Previous ECG:  Compared to current    Comparison ECG info: Increased vent rate    Similarity:  Changes noted    Comparison to cardiac monitor: No    Interpretation:     Interpretation: abnormal    Rate:     ECG rate:  101    ECG rate assessment: tachycardic    Rhythm:     Rhythm: sinus tachycardia    Ectopy:     Ectopy: none    QRS:     QRS axis:  Normal    QRS intervals:  Normal  Conduction:     Conduction: normal    ST segments:     ST segments:  Normal  T waves:     T waves: normal    Comments:      Sinus tachycardia but otherwise normal ECG and unchanged from previous tracing. No evidence of acute ischemia or STEMI. Patient reports chest pain and shortness of breath. We are assessing troponin. ED Course  ED Course as of 09/21/23 2031   Thu Sep 21, 2023   2005 FLU/RSV/COVID - if FLU/RSV clinically relevant  negative   2005 ECG 12 lead  Sinus tachycardia but otherwise normal ECG and unchanged from previous tracing. No evidence of acute ischemia or STEMI. Patient reports chest pain and shortness of breath. We are assessing troponin. 2023 Continuation of care signed out to Dr. Fito Whitaker. Disposition pending lab results at this time. Medical Decision Making  The patient is a 56-year-old female with no relevant past medical history who presents to the emergency department with a complaint of severe cough. Upon initial presentation the patient was alert and oriented x4 and in mild distress secondary to coughing. She had a dry, wheezing cough and there was wheezing in the anterior lung field bilaterally. There is also mild sinus congestion as well as tachycardia but the remainder of her physical exam is grossly unremarkable. The differential includes but is not limited to ACS, pneumothorax, pulmonary embolism or pneumonia.   I have ordered an ACS work-up as well as a D-dimer. I have ordered a nebulized albuterol treatment as well as Tylenol and Tessalon Perles. Results pending. Amount and/or Complexity of Data Reviewed  Labs: ordered. Radiology: ordered. Risk  OTC drugs. Prescription drug management. Disposition  Final diagnoses:   None     ED Disposition     None      Follow-up Information    None         Patient's Medications   Discharge Prescriptions    No medications on file     No discharge procedures on file. PDMP Review       Value Time User    PDMP Reviewed  Yes 12/27/2022  3:55 PM Henrey Gilford, MD           ED Provider  Attending physically available and evaluated Bhumi Stafford. I managed the patient along with the ED Attending.     Electronically Signed by         Butch Gomes,   09/21/23 2031

## 2023-09-22 NOTE — DISCHARGE INSTRUCTIONS
Two prescriptions have been sent to your pharmacy. Use as directed on the packaging. Return to the ED if you have worsening shortness of breath, chest pain, difficulty breathing, chills, fevers.

## 2023-09-22 NOTE — ED CARE HANDOFF
Emergency Department Sign Out Note        Sign out and transfer of care from [facility] MARY ALBBENNETT CHARLES. See Separate Emergency Department note. The patient, Abhishek Tijerina, was evaluated by the previous provider for acute wheezy bronchitis. Workup Completed:  History of present illness  Physical Exam  Troponin  CBC  CMP  FLU/RSV/COVID  D-dimer  Orthostatic Vitals  12-lead EKG  Chest x-ray    ED Course / Workup Pending (followup): Patient signed out prior to blood work returning. Upon return, Troponin, D-Dimer, Flu/RSV/COVID were all negative and CBC and CMP were insignificant for electrolyte abnormalities or infectious inflammatory process. HEART Risk Score    Flowsheet Row Most Recent Value   Heart Score Risk Calculator    History 0 Filed at: 09/21/2023 2129   ECG 0 Filed at: 09/21/2023 2129   Age 0 Filed at: 09/21/2023 2129   Risk Factors 1 Filed at: 09/21/2023 2129   Troponin 0 Filed at: 09/21/2023 2129   HEART Score 1 Filed at: 09/21/2023 2129                                     Procedures  Medical Decision Making  37year old female presenting with difficulty breathing and wheezing. Treated in the ED with nebulizer and tessalon pearls and diagnosed with acute wheezy bronchitis. Lab work, 12-lead EKG, and imaging were insignificant for cardia or infectious problem. Patient prescribed albuterol and prednisone. Patient comfortable with discharge at this time. Amount and/or Complexity of Data Reviewed  Labs: ordered. Radiology: ordered and independent interpretation performed. Risk  OTC drugs. Prescription drug management.               Disposition  Final diagnoses:   Acute wheezy bronchitis     Time reflects when diagnosis was documented in both MDM as applicable and the Disposition within this note     Time User Action Codes Description Comment    9/21/2023  8:50 PM Beth Elizondo Add [J20.9] Acute wheezy bronchitis       ED Disposition     ED Disposition   Discharge    Condition   Stable    Date/Time Thu Sep 21, 2023  9:28 PM    Comment   Sarah Torres discharge to home/self care. Follow-up Information     Follow up With Specialties Details Why Grant Montanez MD Internal Medicine  Discuss ED visit [address] [place] [address]  [phone]          Patient's Medications   Discharge Prescriptions    ALBUTEROL (PROVENTIL HFA) 90 MCG/ACT INHALER    Inhale 2 puffs every 6 (six) hours as needed for wheezing       Start Date: 9/21/2023 End Date: --       Order Dose: 2 puffs       Quantity: 6.7 g    Refills: 0    PREDNISONE 10 MG TABLET    Take 4 tablets (40 mg total) by mouth daily for 5 days       Start Date: 9/21/2023 End Date: 9/26/2023       Order Dose: 40 mg       Quantity: 20 tablet    Refills: 0     No discharge procedures on file.        ED Provider  Electronically Signed by     Alon Velásquez MD  09/21/23 3830

## 2023-09-25 LAB
ATRIAL RATE: 101 BPM
P AXIS: 41 DEGREES
PR INTERVAL: 160 MS
QRS AXIS: 52 DEGREES
QRSD INTERVAL: 86 MS
QT INTERVAL: 316 MS
QTC INTERVAL: 409 MS
T WAVE AXIS: 6 DEGREES
VENTRICULAR RATE: 101 BPM

## 2023-09-25 PROCEDURE — 93010 ELECTROCARDIOGRAM REPORT: CPT | Performed by: INTERNAL MEDICINE

## 2024-01-29 ENCOUNTER — APPOINTMENT (EMERGENCY)
Dept: CT IMAGING | Facility: HOSPITAL | Age: 44
End: 2024-01-29
Payer: COMMERCIAL

## 2024-01-29 ENCOUNTER — HOSPITAL ENCOUNTER (EMERGENCY)
Facility: HOSPITAL | Age: 44
Discharge: HOME | End: 2024-01-30
Attending: EMERGENCY MEDICINE
Payer: COMMERCIAL

## 2024-01-29 ENCOUNTER — APPOINTMENT (EMERGENCY)
Dept: ULTRASOUND IMAGING | Facility: HOSPITAL | Age: 44
End: 2024-01-29
Payer: COMMERCIAL

## 2024-01-29 DIAGNOSIS — N20.0 BILATERAL KIDNEY STONES: ICD-10-CM

## 2024-01-29 DIAGNOSIS — R10.13 EPIGASTRIC PAIN: ICD-10-CM

## 2024-01-29 DIAGNOSIS — K29.00 ACUTE GASTRITIS: ICD-10-CM

## 2024-01-29 DIAGNOSIS — K80.20 CHOLELITHIASIS: Primary | ICD-10-CM

## 2024-01-29 LAB
ALBUMIN SERPL BCP-MCNC: 4.2 G/DL (ref 3.5–5)
ALP SERPL-CCNC: 110 U/L (ref 34–104)
ALT SERPL W P-5'-P-CCNC: 17 U/L (ref 7–52)
ANION GAP SERPL CALCULATED.3IONS-SCNC: 3 MMOL/L
AST SERPL W P-5'-P-CCNC: 12 U/L (ref 13–39)
BACTERIA UR QL AUTO: NORMAL /HPF
BASOPHILS # BLD AUTO: 0.01 THOUSANDS/ÂΜL (ref 0–0.1)
BASOPHILS NFR BLD AUTO: 0 % (ref 0–1)
BILIRUB SERPL-MCNC: 0.56 MG/DL (ref 0.2–1)
BILIRUB UR QL STRIP: NEGATIVE
BUN SERPL-MCNC: 8 MG/DL (ref 5–25)
CALCIUM SERPL-MCNC: 10.5 MG/DL (ref 8.4–10.2)
CARDIAC TROPONIN I PNL SERPL HS: <2 NG/L
CHLORIDE SERPL-SCNC: 111 MMOL/L (ref 96–108)
CLARITY UR: CLEAR
CO2 SERPL-SCNC: 25 MMOL/L (ref 21–32)
COLOR UR: ABNORMAL
CREAT SERPL-MCNC: 0.67 MG/DL (ref 0.6–1.3)
EOSINOPHIL # BLD AUTO: 0.09 THOUSAND/ÂΜL (ref 0–0.61)
EOSINOPHIL NFR BLD AUTO: 2 % (ref 0–6)
ERYTHROCYTE [DISTWIDTH] IN BLOOD BY AUTOMATED COUNT: 15.9 % (ref 11.6–15.1)
EXT PREGNANCY TEST URINE: NEGATIVE
GFR SERPL CREATININE-BSD FRML MDRD: 106 ML/MIN/1.73SQ M
GLUCOSE SERPL-MCNC: 88 MG/DL (ref 65–140)
GLUCOSE UR STRIP-MCNC: NEGATIVE MG/DL
HCT VFR BLD AUTO: 40 % (ref 34.8–46.1)
HGB BLD-MCNC: 12.9 G/DL (ref 11.5–15.4)
HGB UR QL STRIP.AUTO: ABNORMAL
HOLD SPECIMEN: NORMAL
IMM GRANULOCYTES # BLD AUTO: 0.01 THOUSAND/UL (ref 0–0.2)
IMM GRANULOCYTES NFR BLD AUTO: 0 % (ref 0–2)
KETONES UR STRIP-MCNC: NEGATIVE MG/DL
LEUKOCYTE ESTERASE UR QL STRIP: NEGATIVE
LIPASE SERPL-CCNC: 10 U/L (ref 11–82)
LYMPHOCYTES # BLD AUTO: 0.87 THOUSANDS/ÂΜL (ref 0.6–4.47)
LYMPHOCYTES NFR BLD AUTO: 21 % (ref 14–44)
MCH RBC QN AUTO: 26.8 PG (ref 26.8–34.3)
MCHC RBC AUTO-ENTMCNC: 32.3 G/DL (ref 31.4–37.4)
MCV RBC AUTO: 83 FL (ref 82–98)
MONOCYTES # BLD AUTO: 0.25 THOUSAND/ÂΜL (ref 0.17–1.22)
MONOCYTES NFR BLD AUTO: 6 % (ref 4–12)
NEUTROPHILS # BLD AUTO: 2.97 THOUSANDS/ÂΜL (ref 1.85–7.62)
NEUTS SEG NFR BLD AUTO: 71 % (ref 43–75)
NITRITE UR QL STRIP: NEGATIVE
NON-SQ EPI CELLS URNS QL MICRO: NORMAL /HPF
NRBC BLD AUTO-RTO: 0 /100 WBCS
PH UR STRIP.AUTO: 6.5 [PH]
PLATELET # BLD AUTO: 185 THOUSANDS/UL (ref 149–390)
PMV BLD AUTO: 13.5 FL (ref 8.9–12.7)
POTASSIUM SERPL-SCNC: 3.8 MMOL/L (ref 3.5–5.3)
PROT SERPL-MCNC: 7 G/DL (ref 6.4–8.4)
PROT UR STRIP-MCNC: NEGATIVE MG/DL
RBC # BLD AUTO: 4.82 MILLION/UL (ref 3.81–5.12)
RBC #/AREA URNS AUTO: NORMAL /HPF
SODIUM SERPL-SCNC: 139 MMOL/L (ref 135–147)
SP GR UR STRIP.AUTO: 1.01 (ref 1–1.03)
UROBILINOGEN UR STRIP-ACNC: 2 MG/DL
WBC # BLD AUTO: 4.2 THOUSAND/UL (ref 4.31–10.16)
WBC #/AREA URNS AUTO: NORMAL /HPF

## 2024-01-29 PROCEDURE — 80053 COMPREHEN METABOLIC PANEL: CPT | Performed by: EMERGENCY MEDICINE

## 2024-01-29 PROCEDURE — 84484 ASSAY OF TROPONIN QUANT: CPT | Performed by: EMERGENCY MEDICINE

## 2024-01-29 PROCEDURE — 81025 URINE PREGNANCY TEST: CPT | Performed by: EMERGENCY MEDICINE

## 2024-01-29 PROCEDURE — C9113 INJ PANTOPRAZOLE SODIUM, VIA: HCPCS | Performed by: EMERGENCY MEDICINE

## 2024-01-29 PROCEDURE — 76705 ECHO EXAM OF ABDOMEN: CPT

## 2024-01-29 PROCEDURE — 74177 CT ABD & PELVIS W/CONTRAST: CPT

## 2024-01-29 PROCEDURE — 81001 URINALYSIS AUTO W/SCOPE: CPT | Performed by: EMERGENCY MEDICINE

## 2024-01-29 PROCEDURE — 36415 COLL VENOUS BLD VENIPUNCTURE: CPT

## 2024-01-29 PROCEDURE — G1004 CDSM NDSC: HCPCS

## 2024-01-29 PROCEDURE — 99285 EMERGENCY DEPT VISIT HI MDM: CPT | Performed by: EMERGENCY MEDICINE

## 2024-01-29 PROCEDURE — 99284 EMERGENCY DEPT VISIT MOD MDM: CPT

## 2024-01-29 PROCEDURE — 83690 ASSAY OF LIPASE: CPT | Performed by: EMERGENCY MEDICINE

## 2024-01-29 PROCEDURE — 93005 ELECTROCARDIOGRAM TRACING: CPT

## 2024-01-29 PROCEDURE — 96361 HYDRATE IV INFUSION ADD-ON: CPT

## 2024-01-29 PROCEDURE — 96374 THER/PROPH/DIAG INJ IV PUSH: CPT

## 2024-01-29 PROCEDURE — 96375 TX/PRO/DX INJ NEW DRUG ADDON: CPT

## 2024-01-29 PROCEDURE — 85025 COMPLETE CBC W/AUTO DIFF WBC: CPT | Performed by: EMERGENCY MEDICINE

## 2024-01-29 RX ORDER — ONDANSETRON 2 MG/ML
4 INJECTION INTRAMUSCULAR; INTRAVENOUS ONCE
Status: COMPLETED | OUTPATIENT
Start: 2024-01-29 | End: 2024-01-29

## 2024-01-29 RX ORDER — PANTOPRAZOLE SODIUM 40 MG/10ML
40 INJECTION, POWDER, LYOPHILIZED, FOR SOLUTION INTRAVENOUS ONCE
Status: COMPLETED | OUTPATIENT
Start: 2024-01-29 | End: 2024-01-29

## 2024-01-29 RX ADMIN — IOHEXOL 85 ML: 350 INJECTION, SOLUTION INTRAVENOUS at 21:02

## 2024-01-29 RX ADMIN — SODIUM CHLORIDE 1000 ML: 0.9 INJECTION, SOLUTION INTRAVENOUS at 18:48

## 2024-01-29 RX ADMIN — PANTOPRAZOLE SODIUM 40 MG: 40 INJECTION, POWDER, FOR SOLUTION INTRAVENOUS at 18:43

## 2024-01-29 RX ADMIN — ONDANSETRON 4 MG: 2 INJECTION INTRAMUSCULAR; INTRAVENOUS at 18:43

## 2024-01-29 NOTE — Clinical Note
Michelle Costa was seen and treated in our emergency department on 1/29/2024.                Diagnosis:     Michelle  may return to work on return date.    She may return on this date: 02/01/2024         If you have any questions or concerns, please don't hesitate to call.      Beth Elizondo, DO    ______________________________           _______________          _______________  Hospital Representative                              Date                                Time

## 2024-01-30 VITALS
HEART RATE: 93 BPM | TEMPERATURE: 98 F | DIASTOLIC BLOOD PRESSURE: 61 MMHG | RESPIRATION RATE: 18 BRPM | OXYGEN SATURATION: 98 % | SYSTOLIC BLOOD PRESSURE: 137 MMHG

## 2024-01-30 LAB
ATRIAL RATE: 77 BPM
P AXIS: 58 DEGREES
PR INTERVAL: 166 MS
QRS AXIS: 34 DEGREES
QRSD INTERVAL: 84 MS
QT INTERVAL: 360 MS
QTC INTERVAL: 407 MS
T WAVE AXIS: 45 DEGREES
VENTRICULAR RATE: 77 BPM

## 2024-01-30 RX ORDER — ONDANSETRON 4 MG/1
4 TABLET, FILM COATED ORAL EVERY 6 HOURS
Qty: 12 TABLET | Refills: 0 | Status: SHIPPED | OUTPATIENT
Start: 2024-01-30

## 2024-01-30 RX ORDER — PANTOPRAZOLE SODIUM 40 MG/1
40 TABLET, DELAYED RELEASE ORAL DAILY
Qty: 30 TABLET | Refills: 0 | Status: SHIPPED | OUTPATIENT
Start: 2024-01-30

## 2024-02-07 ENCOUNTER — CONSULT (OUTPATIENT)
Dept: SURGERY | Facility: CLINIC | Age: 44
End: 2024-02-07
Payer: COMMERCIAL

## 2024-02-07 ENCOUNTER — PREP FOR PROCEDURE (OUTPATIENT)
Dept: SURGERY | Facility: CLINIC | Age: 44
End: 2024-02-07

## 2024-02-07 VITALS
SYSTOLIC BLOOD PRESSURE: 139 MMHG | DIASTOLIC BLOOD PRESSURE: 90 MMHG | WEIGHT: 230 LBS | HEIGHT: 68 IN | BODY MASS INDEX: 34.86 KG/M2 | HEART RATE: 94 BPM

## 2024-02-07 DIAGNOSIS — K80.10 CALCULUS OF GALLBLADDER WITH CHRONIC CHOLECYSTITIS WITHOUT OBSTRUCTION: ICD-10-CM

## 2024-02-07 DIAGNOSIS — K80.10 CCC (CHRONIC CALCULOUS CHOLECYSTITIS): Primary | ICD-10-CM

## 2024-02-07 PROCEDURE — 99204 OFFICE O/P NEW MOD 45 MIN: CPT | Performed by: SURGERY

## 2024-02-07 RX ORDER — NYSTATIN 100000 U/G
1 CREAM TOPICAL 2 TIMES DAILY
COMMUNITY
Start: 2023-11-26

## 2024-02-07 RX ORDER — ESCITALOPRAM OXALATE 10 MG/1
10 TABLET ORAL EVERY MORNING
COMMUNITY
Start: 2023-11-26

## 2024-02-07 RX ORDER — TRIAMCINOLONE ACETONIDE 1 MG/G
OINTMENT TOPICAL
COMMUNITY
Start: 2023-11-26

## 2024-02-07 RX ORDER — QUETIAPINE FUMARATE 25 MG/1
25 TABLET, FILM COATED ORAL
COMMUNITY
Start: 2023-11-26 | End: 2024-02-15 | Stop reason: ALTCHOICE

## 2024-02-07 NOTE — RESULT ENCOUNTER NOTE
Patient called at [phone]  No answer at this time  LMOM for callback to ER 
wife at bedside, updated, all questions answered.

## 2024-02-07 NOTE — H&P (VIEW-ONLY)
Assessment/Plan:    Diagnoses and all orders for this visit:    Calculus of gallbladder with chronic cholecystitis without obstruction  -     Ambulatory Referral to General Surgery    Risks and benefits of a laparoscopic cholecystectomy were discussed with her including potential for bile duct injury, bowel injury, or need for open surgery and she agrees to proceed.  If the equipment is available, we will proceed with a robotic assisted procedure.    If she persists with lower abdominal pain, may entertain a GI opinion postoperatively.    Subjective:      Patient ID: Michelle Costa is a 43 y.o. female.    Patient presents for gallbladder consult.  States she has had episodes of abdominal pain for 2 1/2 weeks.  Ultrasound RUQ  1/29/2024   IMPRESSION:  Cholelithiasis, with focal wall abnormality as described, favored to represent redundant decompressed mucosal wall. Acute cholecystitis is thought to be unlikely.  CT abdomen pelvis 1/29/2024   IMPRESSION:  Gallbladder is partially contracted. There is mild mucosal enhancement but no significant pericholecystic inflammation. Questionable small noncalcified stones near the gallbladder neck. Given patient's symptoms: Recommend right upper quadrant ultrasound.   No biliary duct dilatation is found.     Nonobstructing stones and cysts both kidneys. Stable right upper pole cortical scarring with dilated calyx.    Patient complains of mid to lower abdominal pain across her entire abdomen.  This is associated with fullness and bloating.  Seems worse after a slice of pizza.  Denies any reflux issues.  Denies any bowel issues.  Seems a little lower than typical biliary colic but her other associated complaints are very consistent with biliary colic.  Gallstones are noted as above.  Does appear that she has a stone near the neck of her gallbladder by CT.  CBC and CMP were unremarkable.                  Abdominal Pain  The current episode started 1 to 4 weeks ago. The problem occurs  intermittently. The problem has been unchanged. The pain is located in the LLQ and RLQ. The abdominal pain radiates to the back. Associated symptoms include belching, flatus, nausea and vomiting. Nothing aggravates the pain. The pain is relieved by Nothing. Prior diagnostic workup includes ultrasound and CT scan. Her past medical history is significant for gallstones.       The following portions of the patient's history were reviewed and updated as appropriate:     She  has a past medical history of Acute non-recurrent maxillary sinusitis (01/27/2020), Encounter for consultation (08/18/2020), Exposure to COVID-19 virus (11/24/2020), Ovarian cyst, Pancreatitis, Psychiatric disorder, and Upper respiratory tract infection (10/18/2019).  She  has a past surgical history that includes Tubal ligation and Appendectomy.  Her family history includes Depression in her family; Diabetes in her family; Hypertension in her family; No Known Problems in her daughter, father, maternal grandfather, maternal grandmother, mother, paternal aunt, paternal grandfather, paternal grandmother, sister, son, son, and son; Schizophrenia in her family.  She  reports that she has been smoking cigarettes. She has a 22.0 pack-year smoking history. She has never used smokeless tobacco. She reports current alcohol use. She reports that she does not use drugs.  Current Outpatient Medications   Medication Sig Dispense Refill    escitalopram (LEXAPRO) 10 mg tablet Take 10 mg by mouth every morning      nystatin (MYCOSTATIN) cream Apply 1 Application topically 2 (two) times a day To affected area      QUEtiapine (SEROquel) 25 mg tablet Take 25 mg by mouth daily at bedtime      triamcinolone (KENALOG) 0.1 % ointment APPLY TWICE A DAY AS NEEDED FOR UP TO 2 WEEKS      albuterol (Proventil HFA) 90 mcg/act inhaler Inhale 2 puffs every 6 (six) hours as needed for wheezing 6.7 g 0    buPROPion (WELLBUTRIN XL) 150 mg 24 hr tablet Take 1 tablet (150 mg total) by  "mouth 2 (two) times a day Started with 1 tab once a day for 3 days, then increase to twice a day 180 tablet 1    ondansetron (ZOFRAN) 4 mg tablet Take 1 tablet (4 mg total) by mouth every 6 (six) hours 12 tablet 0    pantoprazole (PROTONIX) 40 mg tablet Take 1 tablet (40 mg total) by mouth daily 30 tablet 0    valACYclovir (VALTREX) 500 mg tablet Take 1 tablet (500 mg total) by mouth 2 (two) times a day for 3 days PRN outbreak 60 tablet 3     No current facility-administered medications for this visit.     She is allergic to mucinex cough for kids [dextromethorphan-guaifenesin] and other..    Review of Systems   Gastrointestinal:  Positive for abdominal distention, abdominal pain, flatus, nausea and vomiting.   All other systems reviewed and are negative.        Objective:      /90   Pulse 94   Ht 5' 7.5\" (1.715 m)   Wt 104 kg (230 lb)   LMP 01/23/2024   BMI 35.49 kg/m²          Physical Exam  HENT:      Head: Atraumatic.      Mouth/Throat:      Mouth: Mucous membranes are moist.   Eyes:      Extraocular Movements: Extraocular movements intact.   Cardiovascular:      Rate and Rhythm: Normal rate.   Pulmonary:      Effort: Pulmonary effort is normal.   Abdominal:      General: Abdomen is flat. There is no distension.      Palpations: Abdomen is soft.      Tenderness: There is no abdominal tenderness.      Hernia: No hernia is present.   Musculoskeletal:      Cervical back: Normal range of motion.   Skin:     General: Skin is warm and dry.   Neurological:      Mental Status: She is alert.      Extremities: No edema  "

## 2024-02-12 ENCOUNTER — ANESTHESIA EVENT (OUTPATIENT)
Dept: PERIOP | Facility: AMBULARY SURGERY CENTER | Age: 44
End: 2024-02-12
Payer: COMMERCIAL

## 2024-02-15 RX ORDER — HYDROXYZINE PAMOATE 50 MG/1
50 CAPSULE ORAL DAILY
COMMUNITY

## 2024-02-16 ENCOUNTER — PATIENT OUTREACH (OUTPATIENT)
Dept: OTHER | Facility: CLINIC | Age: 44
End: 2024-02-16

## 2024-02-21 ENCOUNTER — OFFICE VISIT (OUTPATIENT)
Dept: GASTROENTEROLOGY | Facility: AMBULARY SURGERY CENTER | Age: 44
End: 2024-02-21
Payer: COMMERCIAL

## 2024-02-21 VITALS
DIASTOLIC BLOOD PRESSURE: 80 MMHG | SYSTOLIC BLOOD PRESSURE: 130 MMHG | HEIGHT: 68 IN | WEIGHT: 224.6 LBS | HEART RATE: 79 BPM | BODY MASS INDEX: 34.04 KG/M2

## 2024-02-21 DIAGNOSIS — Z12.11 COLON CANCER SCREENING: ICD-10-CM

## 2024-02-21 DIAGNOSIS — R19.5 LOOSE STOOLS: Primary | ICD-10-CM

## 2024-02-21 DIAGNOSIS — R10.13 POSTPRANDIAL EPIGASTRIC PAIN: ICD-10-CM

## 2024-02-21 DIAGNOSIS — R10.30 LOWER ABDOMINAL PAIN: ICD-10-CM

## 2024-02-21 DIAGNOSIS — K29.00 ACUTE GASTRITIS: ICD-10-CM

## 2024-02-21 PROCEDURE — 99204 OFFICE O/P NEW MOD 45 MIN: CPT | Performed by: PHYSICIAN ASSISTANT

## 2024-02-21 NOTE — PROGRESS NOTES
[facility] - Outpatient Consultation  Michelle Costa 43 y.o. female MRN: [id number]  Encounter: [id number]          ASSESSMENT AND PLAN:      #1.  Postprandial epigastric pain; patient with known gallstone and is being planned for surgery in the next week, however also reports history of H. pylori infection, peptic ulcer disease and H. pylori infection are in the differential at this point.      -Will plan for EGD at the same time as colonoscopy    -Procedures were explained in detail to the patient at this time including associated risks and benefits, risks including but not limited to infection, perforation and bleeding    -Check celiac disease antibody profile    -Will also check stool H. pylori antigen    #2.  Intermittent lower abdominal pain, clinically most likely to represent intraluminal gaseous distention and associated bowel spasm, she endorses bloating and frequent soda intake    -Will follow-up on EGD and colonoscopy, see above and below    -We will also follow-up on celiac disease profiling and workup for H. pylori; depending on outcome of her diagnostic workup if she is still having persistent symptomatology would generally recommend following up in the office postprocedure to discuss about more specific interventions such as dietary strategies, antispasmodics, etc.      #3.  Colon cancer screening, patient has not had colonoscopy, rule out adenomatous polyps or malignancy    -Plan for colonoscopy at the same time as EGD, again procedures were explained in detail to the patient at this time    -Instructions provided for colonoscopy prep    ______________________________________________________________________    HPI: 43-year-old female with history of kidney stone who presents for evaluation, the patient says that she was seen in the emergency room at the end of January with complaints of epigastric pain, nausea and vomiting, ultrasound at that time showed no evidence of  cholecystitis but there appeared to be a 1.2 cm stone in her gallbladder, normal caliber bile ducts and normal liver enzymes on her labs at the time.  She saw general surgery and she is actually being planned for cholecystectomy within the next week.  She says she was also recommended to see us because separately she had also been experiencing intermittent lower abdominal pains with bloating, worsened after certain food intake, she says she does deal with a lot of stress and admittedly drinks soda frequently and eats fast food.  She says her stools have generally had a mushy character for a long time but this is not recently changed.  She has not had a colonoscopy anytime in the last 10 years.  She says her mother was adopted so her family history is not entirely clear.  Denies any rectal bleeding or melena.  Denies any unintentional weight loss.  She does report that she was treated in the past for H. pylori infection, she does not think she ever had a stool test done afterwards to confirm eradication.      REVIEW OF SYSTEMS:    CONSTITUTIONAL: Denies any fever, chills, rigors, and weight loss.  HEENT: No earache or tinnitus. Denies hearing loss or visual disturbances.  CARDIOVASCULAR: No chest pain or palpitations.   RESPIRATORY: Denies any cough, hemoptysis, shortness of breath or dyspnea on exertion.  GASTROINTESTINAL: As noted in the History of Present Illness.   GENITOURINARY: No problems with urination. Denies any hematuria or dysuria.  NEUROLOGIC: No dizziness or vertigo, denies headaches.   MUSCULOSKELETAL: Denies any muscle or joint pain.   SKIN: Denies skin rashes or itching.   ENDOCRINE: Denies excessive thirst. Denies intolerance to heat or cold.  PSYCHOSOCIAL: Denies depression or anxiety. Denies any recent memory loss.       Historical Information   Past Medical History:   Diagnosis Date    Acute non-recurrent maxillary sinusitis 01/27/2020    Bronchitis     Encounter for consultation 08/18/2020     "Exposure to COVID-19 virus 11/24/2020    GERD (gastroesophageal reflux disease)     Kidney stone     Ovarian cyst     Pancreatitis     Psychiatric disorder     Upper respiratory tract infection 10/18/2019     Past Surgical History:   Procedure Laterality Date    APPENDECTOMY      TUBAL LIGATION       Social History   Social History     Substance and Sexual Activity   Alcohol Use Yes    Comment: occassional     Social History     Substance and Sexual Activity   Drug Use No     Social History     Tobacco Use   Smoking Status Every Day    Current packs/day: 0.50    Average packs/day: 0.7 packs/day for 33.0 years (22.0 ttl pk-yrs)    Types: Cigarettes   Smokeless Tobacco Never     Family History   Problem Relation Age of Onset    Depression Family     Diabetes Family     Hypertension Family     Schizophrenia Family     No Known Problems Mother     No Known Problems Father     No Known Problems Sister     No Known Problems Daughter     No Known Problems Maternal Grandmother     No Known Problems Maternal Grandfather     No Known Problems Paternal Grandmother     No Known Problems Paternal Grandfather     No Known Problems Son     No Known Problems Son     No Known Problems Son     No Known Problems Paternal Aunt        Meds/Allergies       Current Outpatient Medications:     albuterol (Proventil HFA) 90 mcg/act inhaler    escitalopram (LEXAPRO) 10 mg tablet    hydrOXYzine pamoate (VISTARIL) 50 mg capsule    nystatin (MYCOSTATIN) cream    triamcinolone (KENALOG) 0.1 % ointment    pantoprazole (PROTONIX) 40 mg tablet    valACYclovir (VALTREX) 500 mg tablet    Allergies   Allergen Reactions    Mucinex Cough For Kids [Dextromethorphan-Guaifenesin] Other (See Comments)     Per verbal of pt \"It causes body aches\"    Other Allergic Rhinitis           Objective     Blood pressure 130/80, pulse 79, height 5' 7.5\" (1.715 m), weight 102 kg (224 lb 9.6 oz), last menstrual period 01/23/2024, not currently breastfeeding. Body mass " index is 34.66 kg/m².        PHYSICAL EXAM:      General Appearance:   Alert, cooperative, no distress   HEENT:   Normocephalic, atraumatic, anicteric.     Neck:  Supple, symmetrical, trachea midline   Lungs:   Clear to auscultation bilaterally; no rales, rhonchi or wheezing; respirations unlabored    Heart::   Regular rate and rhythm; no murmur, rub, or gallop.   Abdomen:   Soft, non-tender, non-distended; normal bowel sounds; no masses, no organomegaly    Genitalia:   Deferred    Rectal:   Deferred    Extremities:  No cyanosis, clubbing or edema    Pulses:  2+ and symmetric    Skin:  No jaundice, rashes, or lesions    Lymph nodes:  No palpable cervical lymphadenopathy        Lab Results:   No visits with results within 1 Day(s) from this visit.   Latest known visit with results is:   Admission on 01/29/2024, Discharged on 01/30/2024   Component Date Value    WBC 01/29/2024 4.20 (L)     RBC 01/29/2024 4.82     Hemoglobin 01/29/2024 12.9     Hematocrit 01/29/2024 40.0     MCV 01/29/2024 83     MCH 01/29/2024 26.8     MCHC 01/29/2024 32.3     RDW 01/29/2024 15.9 (H)     MPV 01/29/2024 13.5 (H)     Platelets 01/29/2024 185     nRBC 01/29/2024 0     Neutrophils Relative 01/29/2024 71     Immat GRANS % 01/29/2024 0     Lymphocytes Relative 01/29/2024 21     Monocytes Relative 01/29/2024 6     Eosinophils Relative 01/29/2024 2     Basophils Relative 01/29/2024 0     Neutrophils Absolute 01/29/2024 2.97     Immature Grans Absolute 01/29/2024 0.01     Lymphocytes Absolute 01/29/2024 0.87     Monocytes Absolute 01/29/2024 0.25     Eosinophils Absolute 01/29/2024 0.09     Basophils Absolute 01/29/2024 0.01     Sodium 01/29/2024 139     Potassium 01/29/2024 3.8     Chloride 01/29/2024 111 (H)     CO2 01/29/2024 25     ANION GAP 01/29/2024 3     BUN 01/29/2024 8     Creatinine 01/29/2024 0.67     Glucose 01/29/2024 88     Calcium 01/29/2024 10.5 (H)     AST 01/29/2024 12 (L)     ALT 01/29/2024 17     Alkaline Phosphatase  01/29/2024 110 (H)     Total Protein 01/29/2024 7.0     Albumin 01/29/2024 4.2     Total Bilirubin 01/29/2024 0.56     eGFR 01/29/2024 106     Lipase 01/29/2024 10 (L)     Extra Tube 01/29/2024 Hold for add-ons.     Color, UA 01/29/2024 Light Yellow     Clarity, UA 01/29/2024 Clear     Specific Gravity, UA 01/29/2024 1.012     pH, UA 01/29/2024 6.5     Leukocytes, UA 01/29/2024 Negative     Nitrite, UA 01/29/2024 Negative     Protein, UA 01/29/2024 Negative     Glucose, UA 01/29/2024 Negative     Ketones, UA 01/29/2024 Negative     Urobilinogen, UA 01/29/2024 2.0 (A)     Bilirubin, UA 01/29/2024 Negative     Occult Blood, UA 01/29/2024 Trace (A)     EXT Preg Test, Ur 01/29/2024 Negative     hs TnI 0hr 01/29/2024 <2     Ventricular Rate 01/29/2024 77     Atrial Rate 01/29/2024 77     NJ Interval 01/29/2024 166     QRSD Interval 01/29/2024 84     QT Interval 01/29/2024 360     QTC Interval 01/29/2024 407     P Axis 01/29/2024 58     QRS [place] 01/29/2024 34     T Wave [place] 01/29/2024 45     RBC, UA 01/29/2024 1-2     WBC, UA 01/29/2024 1-2     Epithelial Cells 01/29/2024 Occasional     Bacteria, UA 01/29/2024 Occasional          Radiology Results:   US right upper quadrant    Result Date: 1/29/2024  Narrative: RIGHT UPPER QUADRANT ULTRASOUND INDICATION:   RUQ pain, abnormal CT. COMPARISON: CT today TECHNIQUE:   Real-time ultrasound of the right upper quadrant was performed with a curvilinear transducer with both volumetric sweeps and still imaging techniques. FINDINGS: PANCREAS:  Visualized portions of the pancreas are within normal limits. AORTA AND IVC:  Visualized portions are normal for patient age. LIVER: Size: 13.4 cm Contour:  Surface contour is smooth. Parenchyma:  Echogenicity and echotexture are within normal limits. 0.9 cm well-circumscribed possibly anechoic lesion in the right inferior lobe, likely a cyst Limited imaging of the main portal vein shows it to be patent and hepatopetal. BILIARY: Wall is top  normal in size. The lumen is decompressed, there is an area of focal wall thickening which is favored to represent redundant and decompressed gallbladder wall.. Internal stone measures 1.2 cm. No surrounding fluid. Negative sonographic Buckner  sign. No intrahepatic biliary dilatation. CBD measures  4.0 mm. No choledocholithiasis. KIDNEY: Right kidney [id number] x 5.5 x 5.7 cm. Volume 168.6 mL Upper pole cyst with peripheral calcification ASCITES:   None.     Impression: Cholelithiasis, with focal wall abnormality as described, favored to represent redundant decompressed mucosal wall. Acute cholecystitis is thought to be unlikely. Workstation performed: [id number]     CT abdomen pelvis with contrast    Result Date: 1/29/2024  Narrative: CT ABDOMEN AND PELVIS WITH IV CONTRAST INDICATION: Epigastric pain epigastric pain. COMPARISON: August 23, 2018 TECHNIQUE: CT examination of the abdomen and pelvis was performed. Multiplanar 2D reformatted images were created from the source data. This examination, like all CT scans performed in the [facility], was performed utilizing techniques to minimize radiation dose exposure, including the use of iterative reconstruction and automated exposure control. Radiation dose length product (DLP) for this visit: 844.73 mGy-cm IV Contrast: 85 mL of iohexol (OMNIPAQUE) Enteric Contrast: Not administered. FINDINGS: ABDOMEN LOWER CHEST: No clinically significant abnormality in the visualized lower chest. LIVER/BILIARY TREE: Small cyst adjacent to the gallbladder fossa measuring 6 mm, previously 5 mm. GALLBLADDER: Partially contracted. Mild mucosal enhancement. No significant local inflammation. No biliary duct dilatation. Suspect small noncalcified stones near the gallbladder neck. SPLEEN: Unremarkable. PANCREAS: Unremarkable. ADRENAL GLANDS: Unremarkable. KIDNEYS/URETERS: Calyceal dilatation and scarring upper pole cortex right kidney with parenchymal calcifications stable  from prior exam. Tiny collecting system stones measuring 1 to 2 mm right upper pole collecting system. Tiny nonobstructing 3 mm stone in the left lower pole collecting system. No hydronephrosis or hydroureter. Small lower pole cysts bilaterally. Numerous stable pelvic phleboliths. STOMACH AND BOWEL: Limited evaluation of GI tract without oral contrast. Stomach unremarkable. Small bowel is average caliber. Large bowel is segmentally collapsed. No evidence of obstruction or perforation. No evidence of colitis or diverticulitis. APPENDIX: Prior appendectomy. ABDOMINOPELVIC CAVITY: No ascites. No pneumoperitoneum. No lymphadenopathy. VESSELS: Unremarkable for patient's age. PELVIS REPRODUCTIVE ORGANS: Unremarkable for patient's age. URINARY BLADDER: Unremarkable. ABDOMINAL WALL/INGUINAL REGIONS: Tiny fat-containing umbilical hernia. BONES: No acute fracture or suspicious osseous lesion.     Impression: Gallbladder is partially contracted. There is mild mucosal enhancement but no significant pericholecystic inflammation. Questionable small noncalcified stones near the gallbladder neck. Given patient's symptoms: Recommend right upper quadrant ultrasound.  No biliary duct dilatation is found. Nonobstructing stones and cysts both kidneys. Stable right upper pole cortical scarring with dilated calyx. The study was marked in EPIC for immediate notification. Workstation performed: [id number]

## 2024-02-21 NOTE — PATIENT INSTRUCTIONS
Scheduled date of EGD/colonoscopy (as of today):04/05/24  Physician performing EGD/colonoscopy: Dr. Stevens  Location of EGD/colonoscopy: [facility]  Desired bowel prep reviewed with patient: miralax  Instructions reviewed with patient by: carol  Clearances:  rosalio

## 2024-02-23 RX ORDER — CEFAZOLIN SODIUM 2 G/50ML
2000 SOLUTION INTRAVENOUS ONCE
OUTPATIENT
Start: 2024-02-26

## 2024-02-26 ENCOUNTER — HOSPITAL ENCOUNTER (OUTPATIENT)
Facility: AMBULARY SURGERY CENTER | Age: 44
Setting detail: OUTPATIENT SURGERY
Discharge: HOME | End: 2024-02-26
Attending: SURGERY | Admitting: SURGERY
Payer: COMMERCIAL

## 2024-02-26 ENCOUNTER — ANESTHESIA (OUTPATIENT)
Dept: PERIOP | Facility: AMBULARY SURGERY CENTER | Age: 44
End: 2024-02-26
Payer: COMMERCIAL

## 2024-02-26 VITALS
SYSTOLIC BLOOD PRESSURE: 114 MMHG | OXYGEN SATURATION: 100 % | HEIGHT: 67 IN | DIASTOLIC BLOOD PRESSURE: 73 MMHG | BODY MASS INDEX: 34.53 KG/M2 | TEMPERATURE: 97.2 F | WEIGHT: 220 LBS | RESPIRATION RATE: 18 BRPM | HEART RATE: 65 BPM

## 2024-02-26 DIAGNOSIS — K80.10 CCC (CHRONIC CALCULOUS CHOLECYSTITIS): ICD-10-CM

## 2024-02-26 DIAGNOSIS — K80.10 CALCULUS OF GALLBLADDER WITH CHRONIC CHOLECYSTITIS WITHOUT OBSTRUCTION: Primary | ICD-10-CM

## 2024-02-26 DIAGNOSIS — T65.291A TOXIC EFFECT OF TOBACCO AND NICOTINE, ACCIDENTAL OR UNINTENTIONAL, INITIAL ENCOUNTER: ICD-10-CM

## 2024-02-26 PROCEDURE — 88304 TISSUE EXAM BY PATHOLOGIST: CPT | Performed by: PATHOLOGY

## 2024-02-26 PROCEDURE — 47562 LAPAROSCOPIC CHOLECYSTECTOMY: CPT | Performed by: SURGERY

## 2024-02-26 RX ORDER — METOCLOPRAMIDE HYDROCHLORIDE 5 MG/ML
10 INJECTION INTRAMUSCULAR; INTRAVENOUS ONCE AS NEEDED
Status: COMPLETED | OUTPATIENT
Start: 2024-02-26 | End: 2024-02-26

## 2024-02-26 RX ORDER — ONDANSETRON 2 MG/ML
INJECTION INTRAMUSCULAR; INTRAVENOUS AS NEEDED
Status: DISCONTINUED | OUTPATIENT
Start: 2024-02-26 | End: 2024-02-26

## 2024-02-26 RX ORDER — FENTANYL CITRATE 50 UG/ML
INJECTION, SOLUTION INTRAMUSCULAR; INTRAVENOUS AS NEEDED
Status: DISCONTINUED | OUTPATIENT
Start: 2024-02-26 | End: 2024-02-26

## 2024-02-26 RX ORDER — MAGNESIUM HYDROXIDE 1200 MG/15ML
LIQUID ORAL AS NEEDED
Status: DISCONTINUED | OUTPATIENT
Start: 2024-02-26 | End: 2024-02-26 | Stop reason: HOSPADM

## 2024-02-26 RX ORDER — OXYCODONE HYDROCHLORIDE 5 MG/1
5 TABLET ORAL EVERY 4 HOURS PRN
Status: DISCONTINUED | OUTPATIENT
Start: 2024-02-26 | End: 2024-02-26 | Stop reason: HOSPADM

## 2024-02-26 RX ORDER — ONDANSETRON 2 MG/ML
4 INJECTION INTRAMUSCULAR; INTRAVENOUS EVERY 4 HOURS PRN
Status: DISCONTINUED | OUTPATIENT
Start: 2024-02-26 | End: 2024-02-26 | Stop reason: HOSPADM

## 2024-02-26 RX ORDER — HYDROMORPHONE HCL/PF 1 MG/ML
SYRINGE (ML) INJECTION AS NEEDED
Status: DISCONTINUED | OUTPATIENT
Start: 2024-02-26 | End: 2024-02-26

## 2024-02-26 RX ORDER — LIDOCAINE HYDROCHLORIDE 10 MG/ML
0.5 INJECTION, SOLUTION EPIDURAL; INFILTRATION; INTRACAUDAL; PERINEURAL ONCE AS NEEDED
Status: DISCONTINUED | OUTPATIENT
Start: 2024-02-26 | End: 2024-02-26 | Stop reason: HOSPADM

## 2024-02-26 RX ORDER — OXYCODONE HYDROCHLORIDE 5 MG/1
5 CAPSULE ORAL EVERY 4 HOURS PRN
Qty: 10 CAPSULE | Refills: 0 | Status: SHIPPED | OUTPATIENT
Start: 2024-02-26 | End: 2024-03-07

## 2024-02-26 RX ORDER — DEXAMETHASONE SODIUM PHOSPHATE 10 MG/ML
INJECTION, SOLUTION INTRAMUSCULAR; INTRAVENOUS AS NEEDED
Status: DISCONTINUED | OUTPATIENT
Start: 2024-02-26 | End: 2024-02-26

## 2024-02-26 RX ORDER — HYDROMORPHONE HCL/PF 1 MG/ML
0.2 SYRINGE (ML) INJECTION
Status: DISCONTINUED | OUTPATIENT
Start: 2024-02-26 | End: 2024-02-26 | Stop reason: HOSPADM

## 2024-02-26 RX ORDER — BUPIVACAINE HYDROCHLORIDE 2.5 MG/ML
INJECTION, SOLUTION EPIDURAL; INFILTRATION; INTRACAUDAL AS NEEDED
Status: DISCONTINUED | OUTPATIENT
Start: 2024-02-26 | End: 2024-02-26 | Stop reason: HOSPADM

## 2024-02-26 RX ORDER — ROCURONIUM BROMIDE 10 MG/ML
INJECTION, SOLUTION INTRAVENOUS AS NEEDED
Status: DISCONTINUED | OUTPATIENT
Start: 2024-02-26 | End: 2024-02-26

## 2024-02-26 RX ORDER — SODIUM CHLORIDE 9 MG/ML
INJECTION, SOLUTION INTRAVENOUS AS NEEDED
Status: DISCONTINUED | OUTPATIENT
Start: 2024-02-26 | End: 2024-02-26 | Stop reason: HOSPADM

## 2024-02-26 RX ORDER — SODIUM CHLORIDE, SODIUM LACTATE, POTASSIUM CHLORIDE, CALCIUM CHLORIDE 600; 310; 30; 20 MG/100ML; MG/100ML; MG/100ML; MG/100ML
125 INJECTION, SOLUTION INTRAVENOUS CONTINUOUS
Status: DISCONTINUED | OUTPATIENT
Start: 2024-02-26 | End: 2024-02-26 | Stop reason: HOSPADM

## 2024-02-26 RX ORDER — HYDROMORPHONE HCL/PF 1 MG/ML
0.5 SYRINGE (ML) INJECTION
Status: DISCONTINUED | OUTPATIENT
Start: 2024-02-26 | End: 2024-02-26 | Stop reason: HOSPADM

## 2024-02-26 RX ORDER — MIDAZOLAM HYDROCHLORIDE 2 MG/2ML
INJECTION, SOLUTION INTRAMUSCULAR; INTRAVENOUS AS NEEDED
Status: DISCONTINUED | OUTPATIENT
Start: 2024-02-26 | End: 2024-02-26

## 2024-02-26 RX ORDER — PROPOFOL 10 MG/ML
INJECTION, EMULSION INTRAVENOUS AS NEEDED
Status: DISCONTINUED | OUTPATIENT
Start: 2024-02-26 | End: 2024-02-26

## 2024-02-26 RX ORDER — DIPHENHYDRAMINE HYDROCHLORIDE 50 MG/ML
12.5 INJECTION INTRAMUSCULAR; INTRAVENOUS ONCE AS NEEDED
Status: DISCONTINUED | OUTPATIENT
Start: 2024-02-26 | End: 2024-02-26 | Stop reason: HOSPADM

## 2024-02-26 RX ORDER — FENTANYL CITRATE/PF 50 MCG/ML
50 SYRINGE (ML) INJECTION
Status: DISCONTINUED | OUTPATIENT
Start: 2024-02-26 | End: 2024-02-26 | Stop reason: HOSPADM

## 2024-02-26 RX ORDER — LIDOCAINE HYDROCHLORIDE 10 MG/ML
INJECTION, SOLUTION EPIDURAL; INFILTRATION; INTRACAUDAL; PERINEURAL AS NEEDED
Status: DISCONTINUED | OUTPATIENT
Start: 2024-02-26 | End: 2024-02-26

## 2024-02-26 RX ORDER — ONDANSETRON 2 MG/ML
4 INJECTION INTRAMUSCULAR; INTRAVENOUS ONCE AS NEEDED
Status: DISCONTINUED | OUTPATIENT
Start: 2024-02-26 | End: 2024-02-26 | Stop reason: HOSPADM

## 2024-02-26 RX ORDER — KETOROLAC TROMETHAMINE 30 MG/ML
INJECTION, SOLUTION INTRAMUSCULAR; INTRAVENOUS AS NEEDED
Status: DISCONTINUED | OUTPATIENT
Start: 2024-02-26 | End: 2024-02-26

## 2024-02-26 RX ORDER — CEFAZOLIN SODIUM 2 G/50ML
SOLUTION INTRAVENOUS AS NEEDED
Status: DISCONTINUED | OUTPATIENT
Start: 2024-02-26 | End: 2024-02-26

## 2024-02-26 RX ADMIN — DEXAMETHASONE SODIUM PHOSPHATE 10 MG: 10 INJECTION INTRAMUSCULAR; INTRAVENOUS at 12:12

## 2024-02-26 RX ADMIN — SUGAMMADEX 200 MG: 100 INJECTION, SOLUTION INTRAVENOUS at 12:42

## 2024-02-26 RX ADMIN — ONDANSETRON 4 MG: 2 INJECTION INTRAMUSCULAR; INTRAVENOUS at 12:54

## 2024-02-26 RX ADMIN — HYDROMORPHONE HYDROCHLORIDE 0.5 MG: 1 INJECTION, SOLUTION INTRAMUSCULAR; INTRAVENOUS; SUBCUTANEOUS at 12:22

## 2024-02-26 RX ADMIN — METOCLOPRAMIDE 10 MG: 5 INJECTION, SOLUTION INTRAMUSCULAR; INTRAVENOUS at 13:08

## 2024-02-26 RX ADMIN — ROCURONIUM BROMIDE 50 MG: 10 INJECTION, SOLUTION INTRAVENOUS at 12:01

## 2024-02-26 RX ADMIN — KETOROLAC TROMETHAMINE 30 MG: 30 INJECTION, SOLUTION INTRAMUSCULAR; INTRAVENOUS at 12:39

## 2024-02-26 RX ADMIN — LIDOCAINE HYDROCHLORIDE 50 MG: 10 INJECTION, SOLUTION EPIDURAL; INFILTRATION; INTRACAUDAL at 12:00

## 2024-02-26 RX ADMIN — PROPOFOL 200 MG: 10 INJECTION, EMULSION INTRAVENOUS at 12:00

## 2024-02-26 RX ADMIN — FENTANYL CITRATE 50 MCG: 50 INJECTION INTRAMUSCULAR; INTRAVENOUS at 12:00

## 2024-02-26 RX ADMIN — SODIUM CHLORIDE, SODIUM LACTATE, POTASSIUM CHLORIDE, AND CALCIUM CHLORIDE: .6; .31; .03; .02 INJECTION, SOLUTION INTRAVENOUS at 11:55

## 2024-02-26 RX ADMIN — CEFAZOLIN SODIUM 2000 MG: 2 SOLUTION INTRAVENOUS at 12:05

## 2024-02-26 RX ADMIN — FENTANYL CITRATE 50 MCG: 50 INJECTION INTRAMUSCULAR; INTRAVENOUS at 12:12

## 2024-02-26 RX ADMIN — ONDANSETRON 4 MG: 2 INJECTION INTRAMUSCULAR; INTRAVENOUS at 12:34

## 2024-02-26 RX ADMIN — MIDAZOLAM 2 MG: 1 INJECTION INTRAMUSCULAR; INTRAVENOUS at 11:55

## 2024-02-26 NOTE — ANESTHESIA POSTPROCEDURE EVALUATION
Post-Op Assessment Note    CV Status:  Stable  Pain Score: 0    Pain management: adequate       Mental Status:  Awake   Hydration Status:  Euvolemic   PONV Controlled:  Controlled   Airway Patency:  Patent     Post Op Vitals Reviewed: Yes    No anethesia notable event occurred.    Staff: CRNA               BP   160/96   Temp   97.5   Pulse  92   Resp   16   SpO2   100%

## 2024-02-26 NOTE — OP NOTE
OPERATIVE REPORT  PATIENT NAME: Michelle Costa    :  1980  MRN: [id number]  Pt Location: [facility] OR [facility]OM 05    SURGERY DATE: 2024    Surgeons and Role:     * Angel Mera DO - Primary     * Rajiv Streeter MD - Assisting    Preop Diagnosis:  CCC (chronic calculous cholecystitis) [K80.10]    Post-Op Diagnosis Codes:     * CCC (chronic calculous cholecystitis) [K80.10]    Procedure(s):  CHOLECYSTECTOMY LAPAROSCOPIC    Specimen(s):  * No specimens in log *    Estimated Blood Loss:   Minimal    Drains:  * No LDAs found *    Anesthesia Type:   General/local    Operative Indications:  CCC (chronic calculous cholecystitis) [K80.10]      Operative Findings:  Changes consistent with chronic calculus cholecystitis  ASA 2.  Wound class II  67 inches weight 100 kg/2 and 20 pounds.  BMI 34    Complications:   None    Procedure and Technique:  Patient was brought the operative suite and identified by visualization, conversation, by armband.  Sequential compression pumps were placed.  She was given Ancef perioperatively.  Once under anesthesia abdomen was then prepped and draped in a sterile fashion.  Timeout was performed was assured that the prep was dry.  Local was instilled at the infraumbilical fold.  Small vertical skin incision was made and the subcutaneous tissues were bluntly dissected with Stephenson clamps down to the fascia.  Fascia was lifted up and divided the midline.  Poked the underlying peritoneum gaining access into the abdominal cavity.  An 11 mm trocar was placed under direct visualization.  CO2 was attached crating pneumoperitoneum through the 5 Conrad bladeless trocars were placed in the right upper quadrant.  She did have some omental adhesions around the umbilicus.  Were able to work our way around these.  Gallbladder was able to be lifted cephalad.  Neck of the gallbladder was grasped with and retracted laterally and anteriorly.  Careful dissection was carried out to identify both cystic duct  and cystic artery structures.  They were divided to ligaclips.  Gallbladder was then taken off the liver using variable speed the harmonic scalpel.  There was good hemostasis.  Gallbladder is placed into an Endo Catch bag.  Irrigation was carried out.  There was good hemostasis.  Gallbladder is brought to the umbilical port site.  Other trocars removed.  Fascia at the umbilical port site was closed using 0 Vicryl in a figure-of-eight fashion.  More local was instilled.  4-0 Monocryl was used to close skin incision in a subicular fashion.  Wounds were washed and dried.  Sterile skin glue was applied.  She was awakened in the operating room and returned to the recovery area in stable condition having tolerated the procedure well.   I was present for the entire procedure.    Patient Disposition:  PACU         SIGNATURE: Angel Mera DO  DATE: February 26, 2024  TIME: 12:39 PM

## 2024-02-26 NOTE — ANESTHESIA PREPROCEDURE EVALUATION
Procedure:  CHOLECYSTECTOMY LAPAROSCOPIC (Abdomen)    Relevant Problems   ANESTHESIA (within normal limits)      CARDIO (within normal limits)      ENDO (within normal limits)      GI/HEPATIC (within normal limits)      /RENAL (within normal limits)      GYN (within normal limits)      HEMATOLOGY (within normal limits)      MUSCULOSKELETAL (within normal limits)      NEURO/PSYCH   (+) Anxiety      PULMONARY   (+) Smoking      Digestive   (+) Calculus of gallbladder with chronic cholecystitis without obstruction        Physical Exam    Airway    Mallampati score: II  TM Distance: >3 FB  Neck ROM: full     Dental   No notable dental hx     Cardiovascular  Rhythm: regular, Rate: normal, Cardiovascular exam normal    Pulmonary  Pulmonary exam normal Breath sounds clear to auscultation    Other Findings  post-pubertal.      Anesthesia Plan  ASA Score- 2     Anesthesia Type- general with ASA Monitors.         Additional Monitors:     Airway Plan: ETT.           Plan Factors-Exercise tolerance (METS): >4 METS.    Chart reviewed. EKG reviewed. Imaging results reviewed. Existing labs reviewed. Patient summary reviewed.                  Induction- intravenous.    Postoperative Plan- Plan for postoperative opioid use.     Informed Consent- Anesthetic plan and risks discussed with patient.  I personally reviewed this patient with the CRNA. Discussed and agreed on the Anesthesia Plan with the CRNA..            Recent labs personally reviewed:  Lab Results   Component Value Date    WBC 4.20 (L) 01/29/2024    HGB 12.9 01/29/2024     01/29/2024     Lab Results   Component Value Date     08/18/2015    K 3.8 01/29/2024    BUN 8 01/29/2024    CREATININE 0.67 01/29/2024    GLUCOSE 86 08/18/2015     Lab Results   Component Value Date    HGBA1C 5.5 09/28/2023       I, Izaiah Bermeo MD, have personally seen and evaluated the patient prior to anesthetic care.  I have reviewed the pre-anesthetic record, and other  medical records if appropriate to the anesthetic care.  If a CRNA is involved in the case, I have reviewed the CRNA assessment, if present, and agree. Risks/benefits and alternatives discussed with patient including possible PONV, sore throat, and possibility of rare anesthetic and surgical emergencies.

## 2024-02-26 NOTE — DISCHARGE INSTR - AVS FIRST PAGE
Laparoscopic Cholecystectomy    WHAT YOU SHOULD KNOW:    Cholecystitis is inflammation of your gallbladder. Your gallbladder stores bile, which helps break down the fat that you eat. It also helps remove certain chemicals from your body. You may have a sudden, severe attack (acute cholecystitis) or several mild attacks (chronic cholecystitis).  Laparoscopic cholecystectomy is surgery to remove your gallbladder.  During this surgery, small incisions are made in your abdomen. A small scope and special tools are inserted through these incisions. Your gallbladder is removed from it normal location and taken out of your abdomen.  The incisions are closed with sutures and a small amount of glue is applied over top incisions to help reinforce the incisions to optimize healing.         AFTER YOU LEAVE:  Following discharge from the hospital, you may have some questions about your procedure, your activities or your general condition.  These instructions may answer some of your questions and help you adjust during the first few days following your operation.  You can expect to be sore and tender mostly around the incisions. This pain should last approximally 5 days and gradually improve daily.          Incisions:Your doctor may have chosen to use a type of adhesive glue, to close your incision.The glue is used to cover the incision, assist in closure, and prevent contamination so to optimize healing. This adhesive glue will darken and may appear as a purple film over the incision.  Do not pick at the glue, it will peel away on its own within one to two weeks.    You may apply ice to the incisions to help with pain. Avoid heat as this my make the glue tacky   It is normal to have some bruising, swelling or mild discoloration around the incision.  If increasing redness or pain develops, call our office immediately.   You may wash the incision gently with soap and water then pat dry.    Do not apply any creams or ointments.      Dressings: You do not usually need to keep incisions covered with a dressing.  A dressing is only required if you had a drain following your surgery and the drain was removed prior to discharge.  If a dressing is required the doctor will discuss the dressing with prior to leaving. You may remove the dressing for showering, but reapply when dry.     Bathing: You may shower daily with soap and water the day after the procedure. It is OK to GENTLY wash the incision with soap and water then pat dry.  Do NOT soak incision in a tub, pool, or hot tub for 2 weeks.    Diet: Eat low-fat foods for 4 to 6 weeks while your body learns to digest fat without a gallbladder. Slowly increase the amount of fat that you eat. We recommend you slowly advance your diet. Try to start with softer bland foods and gradually advance as tolerated.   Be sure to consume plenty of water.  Avoid alcohol.        Activity/Restrictions: The evening following the procedure you should rest as much as possible, sitting, lying or reclining.  you should be sure someone remains with you until the next morning.  Gradually increase your activity daily. Walking 3-4 daily is good and  stairs are ok.  Listen to your body.  If you start to get tired or sore then rest.   No strenuous activity or exercise for 3-4 weeks.   No driving for 5 days or while taking narcotics for pain.      Return or work: You may return to work or other activities as soon as your pain is controlled and you feel comfortable. For many people, this is 5 to 7 days after surgery. If your job requires heavy lifting you will need to be on light duty for 2-3 weeks.      Follow up appointment: following discharge from the hospital call the office in 1-2 days to set up a post operative appointment to be seen in 2-3 weeks.  The phone number and address should be provided in your discharge paper work.    Medication: Please take all medications as prescribed. Call your healthcare provider if you  think your medicine is not helping or if you have side effects.   If you were given a prescription for Percocet, Norco, or Vicodin for pain be sure to eat prior to taking as these medications as they may cause nausea and vomiting on an empty stomach.    DO NOT take Tylenol with these medication for a fever or for further pain control as these medications already contain Tylenol in them.      Contact your healthcare provider if:   You have a fever over 101°F (38°C) or chills.    You have pain or nausea that is not relieved by medicine.    You have redness and swelling around your incisions, or blood or pus is leaking from your incisions.    You are constipated or have diarrhea.    Your skin or eyes are yellow, or your bowel movements are pale.    You have questions or concerns about your surgery, condition, or care.  Seek care immediately or call 911 if:   You cannot stop vomiting.    Your bowel movements are black or bloody.    You have pain in your abdomen and it is swollen or hard.    Your arm or leg feels warm, tender, and painful. It may look swollen and red.   You feel lightheaded, short of breath, and have chest pain.    You cough up blood.

## 2024-02-26 NOTE — INTERVAL H&P NOTE
H&P reviewed. After examining the patient I find no changes in the patients condition since the H&P had been written.    Vitals:    02/26/24 1054   BP: 130/62   Pulse: 63   Resp: 18   Temp: (!) 97.1 °F (36.2 °C)   SpO2: 100%

## 2024-03-20 ENCOUNTER — NURSE TRIAGE (OUTPATIENT)
Age: 44
End: 2024-03-20

## 2024-03-20 ENCOUNTER — TELEPHONE (OUTPATIENT)
Age: 44
End: 2024-03-20

## 2024-03-20 ENCOUNTER — OFFICE VISIT (OUTPATIENT)
Dept: SURGERY | Facility: CLINIC | Age: 44
End: 2024-03-20

## 2024-03-20 DIAGNOSIS — K80.10 CALCULUS OF GALLBLADDER WITH CHRONIC CHOLECYSTITIS WITHOUT OBSTRUCTION: ICD-10-CM

## 2024-03-20 DIAGNOSIS — R10.9 ABDOMINAL PAIN: Primary | ICD-10-CM

## 2024-03-20 PROCEDURE — 99024 POSTOP FOLLOW-UP VISIT: CPT | Performed by: SURGERY

## 2024-03-20 NOTE — LETTER
March 20, 2024     Lea Reyes, MD  [address] [facility] [address]    Patient: Michelle Costa   YOB: 1980   Date of Visit: 3/20/2024       Dear Dr. Reyes:    Thank you for referring Michelle Costa to me for evaluation. Below are my notes for this consultation.    If you have questions, please do not hesitate to call me. I look forward to following your patient along with you.         Sincerely,        Angel Mear DO        CC: No Recipients    Angel Mera DO  3/20/2024  3:47 PM  Sign when Signing Visit  Assessment/Plan:    Diagnoses and all orders for this visit:    Abdominal pain  -     Comprehensive metabolic panel; Future  -     Lipase; Future    Calculus of gallbladder with chronic cholecystitis without obstruction    Weeks status post laparoscopic cholecystectomy.  Overall doing well until 48 hours ago.  Will check CMP and lipase.  Recommend Prilosec OTC daily for 1 week.  She has met with GI just before the surgery.  States a colonoscopy is potentially being scheduled    Pathology: Reviewed with patient, all questions answered.       Postoperative restrictions reviewed. All questions answered.       ______________________________________________________  HPI: Patient presents post operatively.  Laparoscopic cholecystectomy 2/26/2024   Final Diagnosis  A. Gallbladder:  - Chronic cholecystitis with cholelithiasis.  - Negative for malignancy.    Laparoscopic cholecystectomy 3 weeks ago.  Has been doing well until 48 hours ago when she complains of a lot of epigastric pain and burning.  Complains of some back discomfort as well.  No fevers or chills.  Had some yogurt this morning but vomited that up.  Denies any ill contacts.  Denies any dark urine.  No signs of jaundice on today's exam.  Is any urinary complaints.                   ROS:  General ROS: negative for - chills, fatigue, fever or night sweats, weight loss  Respiratory ROS: no cough, shortness of breath, or  wheezing  Cardiovascular ROS: no chest pain or dyspnea on exertion  Genito-Urinary ROS: no dysuria, trouble voiding, or hematuria  Musculoskeletal ROS: negative for - gait disturbance, joint pain or muscle pain  Neurological ROS: no TIA or stroke symptoms  GI ROS: see HPI  Skin ROS: no new rashes or lesions   Lymphatic ROS: no new adenopathy noted by pt.   GYN ROS: see HPI, no new GYN history or bleeding noted  Psy ROS: no new mental or behavioral disturbances         Patient Active Problem List   Diagnosis   • Herpes simplex type 2 infection   • Anxiety   • Class 2 obesity due to excess calories without serious comorbidity with body mass index (BMI) of 35.0 to 35.9 in adult   • Tinnitus of left ear   • Annual physical exam   • Smoking   • Encounter for smoking cessation counseling   • Calculus of gallbladder with chronic cholecystitis without obstruction       Allergies:  Mucinex cough for kids [dextromethorphan-guaifenesin] and Other      Current Outpatient Medications:   •  albuterol (Proventil HFA) 90 mcg/act inhaler, Inhale 2 puffs every 6 (six) hours as needed for wheezing, Disp: 6.7 g, Rfl: 0  •  escitalopram (LEXAPRO) 10 mg tablet, Take 10 mg by mouth every morning, Disp: , Rfl:   •  hydrOXYzine pamoate (VISTARIL) 50 mg capsule, Take 50 mg by mouth in the morning One additional dose if needed, Disp: , Rfl:   •  nystatin (MYCOSTATIN) cream, Apply 1 Application topically 2 (two) times a day To affected area, Disp: , Rfl:   •  pantoprazole (PROTONIX) 40 mg tablet, Take 1 tablet (40 mg total) by mouth daily (Patient not taking: Reported on 2/21/2024), Disp: 30 tablet, Rfl: 0  •  triamcinolone (KENALOG) 0.1 % ointment, APPLY TWICE A DAY AS NEEDED FOR UP TO 2 WEEKS, Disp: , Rfl:   •  valACYclovir (VALTREX) 500 mg tablet, Take 1 tablet (500 mg total) by mouth 2 (two) times a day for 3 days PRN outbreak (Patient taking differently: Take 1,000 mg by mouth daily PRN outbreak), Disp: 60 tablet, Rfl: 3    Past Medical  History:   Diagnosis Date   • Acute non-recurrent maxillary sinusitis 01/27/2020   • Bronchitis    • Encounter for consultation 08/18/2020   • Exposure to COVID-19 virus 11/24/2020   • GERD (gastroesophageal reflux disease)    • Kidney stone    • Ovarian cyst    • Pancreatitis    • Psychiatric disorder    • Upper respiratory tract infection 10/18/2019       Past Surgical History:   Procedure Laterality Date   • APPENDECTOMY     • [place] LAPAROSCOPY SURG CHOLECYSTECTOMY N/A 2/26/2024    Procedure: CHOLECYSTECTOMY LAPAROSCOPIC;  Surgeon: Angel Mera DO;  Location: [facility] MAIN OR;  Service: General   • TUBAL LIGATION         Family History   Problem Relation Age of Onset   • Depression Family    • Diabetes Family    • Hypertension Family    • Schizophrenia Family    • No Known Problems Mother    • No Known Problems Father    • No Known Problems Sister    • No Known Problems Daughter    • No Known Problems Maternal Grandmother    • No Known Problems Maternal Grandfather    • No Known Problems Paternal Grandmother    • No Known Problems Paternal Grandfather    • No Known Problems Son    • No Known Problems Son    • No Known Problems Son    • No Known Problems Paternal Aunt         reports that she has been smoking cigarettes. She has a 22 pack-year smoking history. She has never used smokeless tobacco. She reports current alcohol use. She reports that she does not use drugs.    PHYSICAL EXAM    LMP 02/22/2024     General: normal, cooperative, no distress  Abdominal: soft, nondistended, or nontender  Incision: clean, dry, and intact and healing well.  Well-healed trocar sites.  No real palpable tenderness on today's exam in the epigastrium or in the flanks.      Angel Mera DO    Date: 3/20/2024 Time: 3:45 PM

## 2024-03-20 NOTE — PROGRESS NOTES
Assessment/Plan:    Diagnoses and all orders for this visit:    Abdominal pain  -     Comprehensive metabolic panel; Future  -     Lipase; Future    Calculus of gallbladder with chronic cholecystitis without obstruction    Weeks status post laparoscopic cholecystectomy.  Overall doing well until 48 hours ago.  Will check CMP and lipase.  Recommend Prilosec OTC daily for 1 week.  She has met with GI just before the surgery.  States a colonoscopy is potentially being scheduled    Pathology: Reviewed with patient, all questions answered.       Postoperative restrictions reviewed. All questions answered.       ______________________________________________________  HPI: Patient presents post operatively.  Laparoscopic cholecystectomy 2/26/2024   Final Diagnosis  A. Gallbladder:  - Chronic cholecystitis with cholelithiasis.  - Negative for malignancy.    Laparoscopic cholecystectomy 3 weeks ago.  Has been doing well until 48 hours ago when she complains of a lot of epigastric pain and burning.  Complains of some back discomfort as well.  No fevers or chills.  Had some yogurt this morning but vomited that up.  Denies any ill contacts.  Denies any dark urine.  No signs of jaundice on today's exam.  Is any urinary complaints.                   ROS:  General ROS: negative for - chills, fatigue, fever or night sweats, weight loss  Respiratory ROS: no cough, shortness of breath, or wheezing  Cardiovascular ROS: no chest pain or dyspnea on exertion  Genito-Urinary ROS: no dysuria, trouble voiding, or hematuria  Musculoskeletal ROS: negative for - gait disturbance, joint pain or muscle pain  Neurological ROS: no TIA or stroke symptoms  GI ROS: see HPI  Skin ROS: no new rashes or lesions   Lymphatic ROS: no new adenopathy noted by pt.   GYN ROS: see HPI, no new GYN history or bleeding noted  Psy ROS: no new mental or behavioral disturbances         Patient Active Problem List   Diagnosis    Herpes simplex type 2 infection     Anxiety    Class 2 obesity due to excess calories without serious comorbidity with body mass index (BMI) of 35.0 to 35.9 in adult    Tinnitus of left ear    Annual physical exam    Smoking    Encounter for smoking cessation counseling    Calculus of gallbladder with chronic cholecystitis without obstruction       Allergies:  Mucinex cough for kids [dextromethorphan-guaifenesin] and Other      Current Outpatient Medications:     albuterol (Proventil HFA) 90 mcg/act inhaler, Inhale 2 puffs every 6 (six) hours as needed for wheezing, Disp: 6.7 g, Rfl: 0    escitalopram (LEXAPRO) 10 mg tablet, Take 10 mg by mouth every morning, Disp: , Rfl:     hydrOXYzine pamoate (VISTARIL) 50 mg capsule, Take 50 mg by mouth in the morning One additional dose if needed, Disp: , Rfl:     nystatin (MYCOSTATIN) cream, Apply 1 Application topically 2 (two) times a day To affected area, Disp: , Rfl:     pantoprazole (PROTONIX) 40 mg tablet, Take 1 tablet (40 mg total) by mouth daily (Patient not taking: Reported on 2/21/2024), Disp: 30 tablet, Rfl: 0    triamcinolone (KENALOG) 0.1 % ointment, APPLY TWICE A DAY AS NEEDED FOR UP TO 2 WEEKS, Disp: , Rfl:     valACYclovir (VALTREX) 500 mg tablet, Take 1 tablet (500 mg total) by mouth 2 (two) times a day for 3 days PRN outbreak (Patient taking differently: Take 1,000 mg by mouth daily PRN outbreak), Disp: 60 tablet, Rfl: 3    Past Medical History:   Diagnosis Date    Acute non-recurrent maxillary sinusitis 01/27/2020    Bronchitis     Encounter for consultation 08/18/2020    Exposure to COVID-19 virus 11/24/2020    GERD (gastroesophageal reflux disease)     Kidney stone     Ovarian cyst     Pancreatitis     Psychiatric disorder     Upper respiratory tract infection 10/18/2019       Past Surgical History:   Procedure Laterality Date    APPENDECTOMY      [place] LAPAROSCOPY SURG CHOLECYSTECTOMY N/A 2/26/2024    Procedure: CHOLECYSTECTOMY LAPAROSCOPIC;  Surgeon: Angel Mera DO;  [facility];  Service: General    TUBAL LIGATION         Family History   Problem Relation Age of Onset    Depression Family     Diabetes Family     Hypertension Family     Schizophrenia Family     No Known Problems Mother     No Known Problems Father     No Known Problems Sister     No Known Problems Daughter     No Known Problems Maternal Grandmother     No Known Problems Maternal Grandfather     No Known Problems Paternal Grandmother     No Known Problems Paternal Grandfather     No Known Problems Son     No Known Problems Son     No Known Problems Son     No Known Problems Paternal Aunt         reports that she has been smoking cigarettes. She has a 22 pack-year smoking history. She has never used smokeless tobacco. She reports current alcohol use. She reports that she does not use drugs.    PHYSICAL EXAM    LMP 02/22/2024     General: normal, cooperative, no distress  Abdominal: soft, nondistended, or nontender  Incision: clean, dry, and intact and healing well.  Well-healed trocar sites.  No real palpable tenderness on today's exam in the epigastrium or in the flanks.      Angel Mera,     Date: 3/20/2024 Time: 3:45 PM

## 2024-03-20 NOTE — TELEPHONE ENCOUNTER
"Pt warm transferred from [place] to this CTS-RN.    Pt POD 23 Procedure: CHOLECYSTECTOMY LAPAROSCOPIC (Abdomen) with Dr. Mera. Pt unable to make 2-week post-op appt due to having to work.    Pt reports constant \"burning\" 8/10 pain across mid abd since Monday. \"It feels like it's on fire.\" +n/v. Pt tried taking pepto bismol & tylenol w/o relief. Pt reports taking prescribed oxycodone only shortly after surgery and none since.     Appt scheduled for today.      Reason for Disposition   Patient wants to be seen    Answer Assessment - Initial Assessment Questions  1. SYMPTOM: \"What's the main symptom you're concerned about?\" (e.g., pain, fever, vomiting)      Abd pain  2. ONSET: \"When did pain  start?\"      Monday  3. SURGERY: \"What surgery was performed?\"      Procedure: CHOLECYSTECTOMY LAPAROSCOPIC (Abdomen)  4. DATE of SURGERY: \"When was surgery performed?\"       2/26/24  5. ANESTHESIA: \" What type of anesthesia did you have?\" (e.g., general, spinal, epidural, local)      general  6. PAIN: \"Is there any pain?\" If Yes, ask: \"How bad is it?\"  (Scale 1-10; or mild, moderate, severe)      Burning, 8/10  7. FEVER: \"Do you have a fever?\" If Yes, ask: \"What is your temperature, how was it measured, and when did it start?\"      denies  8. VOMITING: \"Is there any vomiting?\" If yes, ask: \"How many times?\"      yes  9. BLEEDING: \"Is there any bleeding?\" If Yes, ask: \"How much?\" and \"Where?\"      denies  10. OTHER SYMPTOMS: \"Do you have any other symptoms?\" (e.g., drainage from wound, painful urination, constipation)        denies    Protocols used: Post-Op Symptoms and Questions-ADULT-[place]    "

## 2024-03-20 NOTE — TELEPHONE ENCOUNTER
Patient returning called to [place] from  office, Talk to axel patient coming in for earlier appointment 3:45 pm patient accept the appointment Axel will change patient appointment.

## 2024-03-20 NOTE — TELEPHONE ENCOUNTER
Patient is had surgery 02/26/2024 patient is having lot of pain, warmed transferred to [facility] Abigail was done.

## 2024-03-22 ENCOUNTER — ANESTHESIA (OUTPATIENT)
Dept: ANESTHESIOLOGY | Facility: HOSPITAL | Age: 44
End: 2024-03-22

## 2024-03-22 ENCOUNTER — ANESTHESIA EVENT (OUTPATIENT)
Dept: ANESTHESIOLOGY | Facility: HOSPITAL | Age: 44
End: 2024-03-22

## 2024-05-12 ENCOUNTER — OFFICE VISIT (OUTPATIENT)
Dept: URGENT CARE | Age: 44
End: 2024-05-12
Payer: COMMERCIAL

## 2024-05-12 VITALS
BODY MASS INDEX: 34.46 KG/M2 | DIASTOLIC BLOOD PRESSURE: 70 MMHG | TEMPERATURE: 97.9 F | SYSTOLIC BLOOD PRESSURE: 152 MMHG | OXYGEN SATURATION: 98 % | WEIGHT: 220 LBS | RESPIRATION RATE: 18 BRPM

## 2024-05-12 DIAGNOSIS — J02.9 SORE THROAT: Primary | ICD-10-CM

## 2024-05-12 LAB — S PYO AG THROAT QL: NEGATIVE

## 2024-05-12 PROCEDURE — 87070 CULTURE OTHR SPECIMN AEROBIC: CPT

## 2024-05-12 PROCEDURE — G0383 LEV 4 HOSP TYPE B ED VISIT: HCPCS

## 2024-05-12 PROCEDURE — 87880 STREP A ASSAY W/OPTIC: CPT

## 2024-05-12 NOTE — PATIENT INSTRUCTIONS
Please trial warm salt water gargles, Chloraseptic spray, Cepacol cough drops and warm tea with honey as needed for sore throat.  May trial Motrin as needed for headache.   Follow up with PCP in 3-5 days.  Proceed to  ER if symptoms worsen.    If tests are performed, our office will contact you with results only if changes need to made to the care plan discussed with you at the visit. You can review your full results on [facility] Mychart.

## 2024-05-12 NOTE — PROGRESS NOTES
[facility]        NAME: Michelle Costa is a 43 y.o. female  : 1980    MRN: [id number]  DATE: May 12, 2024  TIME: 6:33 PM    Assessment and Plan   Sore throat [J02.9]  1. Sore throat  POCT rapid ANTIGEN strepA    Throat culture        Rapid strep negative, pending culture results.     Patient Instructions     Please trial warm salt water gargles, Chloraseptic spray, Cepacol cough drops and warm tea with honey as needed for sore throat.  May trial Motrin as needed for headache.   Follow up with PCP in 3-5 days.  Proceed to  ER if symptoms worsen.    If tests are performed, our office will contact you with results only if changes need to made to the care plan discussed with you at the visit. [facility]    Chief Complaint     Chief Complaint   Patient presents with    Sore Throat    Headache     Patient was exposed to strep throat and experiencing a sore throat and a headache that started last night.          History of Present Illness       43-year-old female presenting for evaluation of sore throat.  Patient states that she developed a sore throat associated with headache last night.  She denies any current treatment for symptoms.  She denies fevers, chills, chest pain or shortness of breath.  Patient notes that her granddaughter was recently ill with strep pharyngitis.    Sore Throat   Associated symptoms include headaches. Pertinent negatives include no congestion, diarrhea, shortness of breath or vomiting.   Headache      Review of Systems   Review of Systems   Constitutional:  Negative for chills and fever.   HENT:  Positive for sore throat. Negative for congestion.    Respiratory:  Negative for shortness of breath.    Cardiovascular:  Negative for chest pain.   Gastrointestinal:  Negative for diarrhea, nausea and vomiting.   Neurological:  Positive for headaches.   All other systems reviewed and are negative.        Current Medications       Current  Outpatient Medications:     escitalopram (LEXAPRO) 10 mg tablet, Take 10 mg by mouth every morning, Disp: , Rfl:     hydrOXYzine pamoate (VISTARIL) 50 mg capsule, Take 50 mg by mouth in the morning One additional dose if needed, Disp: , Rfl:     nystatin (MYCOSTATIN) cream, Apply 1 Application topically 2 (two) times a day To affected area, Disp: , Rfl:     triamcinolone (KENALOG) 0.1 % ointment, APPLY TWICE A DAY AS NEEDED FOR UP TO 2 WEEKS, Disp: , Rfl:     albuterol (Proventil HFA) 90 mcg/act inhaler, Inhale 2 puffs every 6 (six) hours as needed for wheezing (Patient not taking: Reported on 5/12/2024), Disp: 6.7 g, Rfl: 0    pantoprazole (PROTONIX) 40 mg tablet, Take 1 tablet (40 mg total) by mouth daily (Patient not taking: Reported on 2/21/2024), Disp: 30 tablet, Rfl: 0    valACYclovir (VALTREX) 500 mg tablet, Take 1 tablet (500 mg total) by mouth 2 (two) times a day for 3 days PRN outbreak (Patient taking differently: Take 1,000 mg by mouth daily PRN outbreak), Disp: 60 tablet, Rfl: 3    Current Allergies     Allergies as of 05/12/2024 - Reviewed 05/12/2024   Allergen Reaction Noted    Mucinex cough for kids [dextromethorphan-guaifenesin] Other (See Comments) 03/23/2022    Other Allergic Rhinitis 03/18/2013            The following portions of the patient's history were reviewed and updated as appropriate: allergies, current medications, past family history, past medical history, past social history, past surgical history and problem list.     Past Medical History:   Diagnosis Date    Acute non-recurrent maxillary sinusitis 01/27/2020    Bronchitis     Encounter for consultation 08/18/2020    Exposure to COVID-19 virus 11/24/2020    GERD (gastroesophageal reflux disease)     Kidney stone     Ovarian cyst     Pancreatitis     Psychiatric disorder     Upper respiratory tract infection 10/18/2019       Past Surgical History:   Procedure Laterality Date    APPENDECTOMY      AK LAPAROSCOPY SURG CHOLECYSTECTOMY N/A  2/26/2024    Procedure: CHOLECYSTECTOMY LAPAROSCOPIC;  Surgeon: Angel Mera DO;  [facility]: [facility] MAIN [facility];  Service: General    TUBAL LIGATION         Family History   Problem Relation Age of Onset    Depression Family     Diabetes Family     Hypertension Family     Schizophrenia Family     No Known Problems Mother     No Known Problems Father     No Known Problems Sister     No Known Problems Daughter     No Known Problems Maternal Grandmother     No Known Problems Maternal Grandfather     No Known Problems Paternal Grandmother     No Known Problems Paternal Grandfather     No Known Problems Son     No Known Problems Son     No Known Problems Son     No Known Problems Paternal Aunt          Medications have been verified.        Objective   /70   Temp 97.9 °F (36.6 °C)   Resp 18   Wt 99.8 kg (220 lb)   SpO2 98%   BMI 34.46 kg/m²        Physical Exam     Physical Exam  Vitals and nursing note reviewed.   Constitutional:       General: She is not in acute distress.     Appearance: Normal appearance. She is normal weight. She is not ill-appearing, toxic-appearing or diaphoretic.   HENT:      Head: Normocephalic and atraumatic.      Right Ear: Tympanic membrane normal.      Left Ear: Tympanic membrane normal.      Nose: Nose normal. No congestion or rhinorrhea.      Mouth/Throat:      Mouth: Mucous membranes are moist.      Pharynx: Oropharynx is clear. Posterior oropharyngeal erythema present. No oropharyngeal exudate.   Eyes:      General:         Right eye: No discharge.         Left eye: No discharge.   Cardiovascular:      Rate and Rhythm: Normal rate and regular rhythm.      Pulses: Normal pulses.      Heart sounds: Normal heart sounds. No murmur heard.     No friction rub. No gallop.   Pulmonary:      Effort: Pulmonary effort is normal. No respiratory distress.      Breath sounds: Normal breath sounds. No stridor. No wheezing, rhonchi or rales.   Chest:      Chest wall: No tenderness.    Abdominal:      General: Bowel sounds are normal.      Palpations: Abdomen is soft.      Tenderness: There is no abdominal tenderness.   Skin:     General: Skin is warm and dry.   Neurological:      Mental Status: She is alert.   Psychiatric:         Mood and Affect: Mood normal.         Behavior: Behavior normal.

## 2024-05-14 LAB — BACTERIA THROAT CULT: NORMAL

## 2024-05-30 ENCOUNTER — NURSE TRIAGE (OUTPATIENT)
Age: 44
End: 2024-05-30

## 2024-05-30 NOTE — TELEPHONE ENCOUNTER
"Scheduled for tomorrow morning. Recent traevel to [facility]   Reason for Disposition   MILD diarrhea (e.g., 1-3 or more stools than normal in past 24 hours) diarrhea without known cause and present > 7 days    Answer Assessment - Initial Assessment Questions  1. DIARRHEA SEVERITY: \"How bad is the diarrhea?\" \"How many extra stools have you had in the past 24 hours than normal?\"     - NO DIARRHEA (SCALE 0)    - MILD (SCALE 1-3): Few loose or mushy BMs; increase of 1-3 stools over normal daily number of stools; mild increase in ostomy output.    -  MODERATE (SCALE 4-7): Increase of 4-6 stools daily over normal; moderate increase in ostomy output.  * SEVERE (SCALE 8-10; OR 'WORST POSSIBLE'): Increase of 7 or more stools daily over normal; moderate increase in ostomy output; incontinence.      Watery. ,moderate   2. ONSET: \"When did the diarrhea begin?\"       Last Thursday , one week   3. BM CONSISTENCY: \"How loose or watery is the diarrhea?\"       Watery   4. VOMITING: \"Are you also vomiting?\" If Yes, ask: \"How many times in the past 24 hours?\"       Denies   5. ABDOMINAL PAIN: \"Are you having any abdominal pain?\" If Yes, ask: \"What does it feel like?\" (e.g., crampy, dull, intermittent, constant)       Intermittent cramping   6. ABDOMINAL PAIN SEVERITY: If present, ask: \"How bad is the pain?\"  (e.g., Scale 1-10; mild, moderate, or severe)    - MILD (1-3): doesn't interfere with normal activities, abdomen soft and not tender to touch     - MODERATE (4-7): interferes with normal activities or awakens from sleep, tender to touch     - SEVERE (8-10): excruciating pain, doubled over, unable to do any normal activities        Moderate intermittent cramping, episodal   7. ORAL INTAKE: If vomiting, \"Have you been able to drink liquids?\" \"How much fluids have you had in the past 24 hours?\"      Been drinking soda , 46 oz , \"not a water drinker\"   8. HYDRATION: \"Any signs of dehydration?\" (e.g., dry mouth [not just dry lips], too " "weak to stand, dizziness, new weight loss) \"When did you last urinate?\"      Weight loss , 6 lbs in 2 weeks   9. EXPOSURE: \"Have you traveled to a foreign country recently?\" \"Have you been exposed to anyone with diarrhea?\" \"Could you have eaten any food that was spoiled?\"      On vacation in [facility] May 13, returned 5/20  10. ANTIBIOTIC USE: \"Are you taking antibiotics now or have you taken antibiotics in the past 2 months?\"        No   11. OTHER SYMPTOMS: \"Do you have any other symptoms?\" (e.g., fever, blood in stool)        Denies any blood , yellow in color , denies fever   12. PREGNANCY: \"Is there any chance you are pregnant?\" \"When was your last menstrual period?\"        denies    Protocols used: Diarrhea-ADULT-[place]    "

## 2024-05-31 ENCOUNTER — OFFICE VISIT (OUTPATIENT)
Dept: INTERNAL MEDICINE CLINIC | Facility: CLINIC | Age: 44
End: 2024-05-31
Payer: COMMERCIAL

## 2024-05-31 VITALS — SYSTOLIC BLOOD PRESSURE: 130 MMHG | DIASTOLIC BLOOD PRESSURE: 82 MMHG | BODY MASS INDEX: 34.24 KG/M2 | WEIGHT: 218.6 LBS

## 2024-05-31 DIAGNOSIS — A09 TRAVELER'S DIARRHEA: Primary | ICD-10-CM

## 2024-05-31 PROCEDURE — 99213 OFFICE O/P EST LOW 20 MIN: CPT | Performed by: INTERNAL MEDICINE

## 2024-05-31 RX ORDER — AZITHROMYCIN 500 MG/1
500 TABLET, FILM COATED ORAL EVERY 24 HOURS
Qty: 3 TABLET | Refills: 0 | Status: SHIPPED | OUTPATIENT
Start: 2024-05-31 | End: 2024-06-03

## 2024-05-31 NOTE — PATIENT INSTRUCTIONS
-Your symptoms are likely due to traveler's diarrhea  -A prescription for azithromycin has been sent to your pharmacy  -You may take Pepto-Bismol as needed for loose stools

## 2024-05-31 NOTE — PROGRESS NOTES
Name: Michelle Costa      : 1980      MRN: [id number]  Encounter Provider: Ean Hardwick MD  Encounter Date: 2024   Encounter department: [facility]    Assessment & Plan     1. Traveler's diarrhea  -     azithromycin (ZITHROMAX) 500 MG tablet; Take 1 tablet (500 mg total) by mouth every 24 hours for 3 days  -Symptoms appear to be consistent with traveler's diarrhea.  Start azithromycin 500 mg x 3 days.  Patient may take over-the-counter Pepto-Bismol as needed for loose stools.  Reviewed red flag signs/symptoms that would warrant re-evaluation.       Subjective      HPI  Patient presents today as an acute visit.  She complains of diarrhea over the past week.  She reports she recently returned from a trip to [facility] on  to .  Around  she began experience cramping and diarrhea.  She denies any associated fever, melena or hematochezia.  She also denies any history of recent antibiotic use.       All other systems negative except for pertinent findings noted in HPI.       Current Outpatient Medications on File Prior to Visit   Medication Sig   • hydrOXYzine pamoate (VISTARIL) 50 mg capsule Take 50 mg by mouth in the morning One additional dose if needed   • nystatin (MYCOSTATIN) cream Apply 1 Application topically 2 (two) times a day To affected area   • triamcinolone (KENALOG) 0.1 % ointment APPLY TWICE A DAY AS NEEDED FOR UP TO 2 WEEKS   • albuterol (Proventil HFA) 90 mcg/act inhaler Inhale 2 puffs every 6 (six) hours as needed for wheezing (Patient not taking: Reported on 2024)   • escitalopram (LEXAPRO) 10 mg tablet Take 10 mg by mouth every morning (Patient not taking: Reported on 2024)   • pantoprazole (PROTONIX) 40 mg tablet Take 1 tablet (40 mg total) by mouth daily (Patient not taking: Reported on 2024)   • valACYclovir (VALTREX) 500 mg tablet Take 1 tablet (500 mg total) by mouth 2 (two) times a day for 3 days PRN outbreak (Patient taking  differently: Take 1,000 mg by mouth daily PRN outbreak)       Objective     /82 (BP Location: Left arm, Patient Position: Sitting, Cuff Size: Large)   Wt 99.2 kg (218 lb 9.6 oz)   BMI 34.24 kg/m²     BP Readings from Last 3 Encounters:   05/31/24 130/82   05/12/24 152/70   02/26/24 114/73        Wt Readings from Last 3 Encounters:   05/31/24 99.2 kg (218 lb 9.6 oz)   05/12/24 99.8 kg (220 lb)   02/26/24 99.8 kg (220 lb)       Physical Exam    General: NAD  HEENT: NCAT, EOMI, normal conjunctiva  Cardiovascular: RRR, normal S1 and S2, no m/r/g  Pulmonary: Normal respiratory effort, no wheezes, rales or rhonchi  GI: Soft, mild epigastric tenderness to palpation, nondistended, hyperactive bowel sounds  Skin: Normal skin color, no rashes     Ean Hardwick MD

## 2024-07-10 DIAGNOSIS — B00.9 HERPES SIMPLEX TYPE 2 INFECTION: ICD-10-CM

## 2024-07-11 RX ORDER — VALACYCLOVIR HYDROCHLORIDE 500 MG/1
500 TABLET, FILM COATED ORAL 2 TIMES DAILY
Qty: 60 TABLET | Refills: 0 | Status: SHIPPED | OUTPATIENT
Start: 2024-07-11 | End: 2024-08-10

## 2024-07-22 ENCOUNTER — OFFICE VISIT (OUTPATIENT)
Dept: INTERNAL MEDICINE CLINIC | Facility: CLINIC | Age: 44
End: 2024-07-22
Payer: COMMERCIAL

## 2024-07-22 VITALS
WEIGHT: 220 LBS | HEIGHT: 67 IN | SYSTOLIC BLOOD PRESSURE: 140 MMHG | BODY MASS INDEX: 34.53 KG/M2 | DIASTOLIC BLOOD PRESSURE: 90 MMHG

## 2024-07-22 DIAGNOSIS — E53.8 B12 DEFICIENCY: ICD-10-CM

## 2024-07-22 DIAGNOSIS — F17.200 SMOKING: ICD-10-CM

## 2024-07-22 DIAGNOSIS — Z71.6 ENCOUNTER FOR SMOKING CESSATION COUNSELING: ICD-10-CM

## 2024-07-22 DIAGNOSIS — E53.8 FOLATE DEFICIENCY: ICD-10-CM

## 2024-07-22 DIAGNOSIS — E78.5 HYPERLIPIDEMIA, UNSPECIFIED HYPERLIPIDEMIA TYPE: ICD-10-CM

## 2024-07-22 DIAGNOSIS — Z00.00 ANNUAL PHYSICAL EXAM: Primary | ICD-10-CM

## 2024-07-22 DIAGNOSIS — E55.9 VITAMIN D DEFICIENCY: ICD-10-CM

## 2024-07-22 DIAGNOSIS — E21.3 HYPERPARATHYROIDISM (HCC): ICD-10-CM

## 2024-07-22 DIAGNOSIS — Z12.4 SCREENING FOR CERVICAL CANCER: ICD-10-CM

## 2024-07-22 DIAGNOSIS — Z11.59 NEED FOR HEPATITIS C SCREENING TEST: ICD-10-CM

## 2024-07-22 DIAGNOSIS — Z12.31 ENCOUNTER FOR SCREENING MAMMOGRAM FOR BREAST CANCER: ICD-10-CM

## 2024-07-22 PROCEDURE — 99213 OFFICE O/P EST LOW 20 MIN: CPT | Performed by: GENERAL ACUTE CARE HOSPITAL

## 2024-07-22 PROCEDURE — 99396 PREV VISIT EST AGE 40-64: CPT | Performed by: GENERAL ACUTE CARE HOSPITAL

## 2024-07-22 RX ORDER — BUPROPION HYDROCHLORIDE 150 MG/1
150 TABLET ORAL EVERY MORNING
Qty: 30 TABLET | Refills: 5 | Status: SHIPPED | OUTPATIENT
Start: 2024-07-22 | End: 2025-01-18

## 2024-07-22 NOTE — ASSESSMENT & PLAN NOTE
Routine blood works:ordered  Vaccines:recommend a dose of pcv 20 given smoking hx. Pt deferred.   Mammogram:ordered  Cervical Cancer screen:Has appointment with obgyn   Wt loss counseling:dicussed. see under separate problem.   Smoking cessation: Counseling provided  Depression screening:neg  Healthy diet and regular exercise  Refer to GI for colon cancer screening    I have her adoption physical form ready.  Just waiting for her to send back her PPD result (done recently at [facility])  Once we receive that and enter the info in the form, she could pick it up.

## 2024-07-22 NOTE — PROGRESS NOTES
Ambulatory Visit  Name: Michelle Costa      : 1980      MRN: [id number]  Encounter Provider: Irma Alba MD  Encounter Date: 2024   [facility] department[facility]    Assessment & Plan   1. Annual physical exam  Assessment & Plan:  Routine blood works:ordered  Vaccines:recommend a dose of pcv 20 given smoking hx. Pt deferred.   Mammogram:ordered  Cervical Cancer screen:Has appointment with obgyn   Wt loss counseling:dicussed. see under separate problem.   Smoking cessation: Counseling provided  Depression screening:neg  Healthy diet and regular exercise  Refer to GI for colon cancer screening    I have her adoption physical form ready.  Just waiting for her to send back her PPD result (done recently at [facility])  Once we receive that and enter the info in the form, she could pick it up.   Orders:  -     Comprehensive metabolic panel; Future  -     CBC and differential; Future  2. Encounter for screening mammogram for breast cancer  -     Mammo screening bilateral w 3d & cad; Future; Expected date: 2024  3. Screening for cervical cancer  -     Ambulatory referral to Obstetrics / Gynecology; Future  4. Need for hepatitis C screening test  5. B12 deficiency  -     Vitamin B12; Future  -     Methylmalonic acid, serum; Future  6. Smoking  Assessment & Plan:  Counseling for smoking cessation provided  Orders:  -     buPROPion (WELLBUTRIN XL) 150 mg 24 hr tablet; Take 1 tablet (150 mg total) by mouth every morning  7. Hyperparathyroidism (HCC)  Assessment & Plan:  We discussed her hypercalcemia over the past 2 years and elevated PTH.  She was referred to Endo in the past but never went.  We discussed the complications from hypercalcemia including osteoporosis and stones.  She also has some nonspecific abdominal pain.  Reentered referral to Endo.  Orders:  -     Ambulatory Referral to Endocrinology; Future  8. Hyperlipidemia, unspecified hyperlipidemia type  -     Lipid Panel With  "Direct LDL; Future  9. Vitamin D deficiency  -     Vitamin D 25 hydroxy; Future  10. Folate deficiency  -     Folate; Future  11. Encounter for smoking cessation counseling  Assessment & Plan:  Counseling for smoking cessation provided.  In the past she was given prescription of Wellbutrin but was not compliant.  She is interested in trying again.  SHe is interested in retrying Wellbutrin.  Prescription sent.       History of Present Illness     HPI  Annual physical.  Has an adoption physical form to fill.  Review of Systems   Constitutional:  Negative for chills, fatigue and fever.   HENT:  Negative for congestion, nosebleeds, postnasal drip, sneezing, sore throat and trouble swallowing.    Eyes:  Negative for pain.   Respiratory:  Negative for cough, chest tightness, shortness of breath and wheezing.    Cardiovascular:  Negative for chest pain, palpitations and leg swelling.   Gastrointestinal:  Negative for abdominal pain, constipation, diarrhea, nausea and vomiting.   Endocrine: Negative for cold intolerance, heat intolerance, polydipsia and polyuria.   Genitourinary:  Negative for difficulty urinating, dysuria, flank pain and hematuria.   Musculoskeletal:  Negative for arthralgias and myalgias.   Skin:  Negative for rash.   Neurological:  Negative for dizziness, tremors, weakness and headaches.   Hematological:  Does not bruise/bleed easily.   Psychiatric/Behavioral:  Negative for confusion and dysphoric mood. The patient is not nervous/anxious.        Objective     /90   Ht 5' 7\" (1.702 m)   Wt 99.8 kg (220 lb)   BMI 34.46 kg/m²     Physical Exam  Constitutional:       General: She is not in acute distress.     Appearance: She is well-developed.   HENT:      Head: Normocephalic and atraumatic.      Right Ear: External ear normal.      Left Ear: External ear normal.   Eyes:      General: No scleral icterus.     Conjunctiva/sclera: Conjunctivae normal.   Neck:      Thyroid: No thyromegaly.      Trachea: " No tracheal deviation.   Cardiovascular:      Rate and Rhythm: Normal rate and regular rhythm.      Heart sounds: Normal heart sounds.   Pulmonary:      Effort: Pulmonary effort is normal. No respiratory distress.      Breath sounds: Normal breath sounds. No wheezing or rales.   Abdominal:      General: Bowel sounds are normal.      Palpations: Abdomen is soft.      Tenderness: There is no abdominal tenderness. There is no guarding or rebound.   Musculoskeletal:      Cervical back: Normal range of motion and neck supple.   Lymphadenopathy:      Cervical: No cervical adenopathy.   Neurological:      Mental Status: She is alert and oriented to person, place, and time.   Psychiatric:         Behavior: Behavior normal.         Thought Content: Thought content normal.         Judgment: Judgment normal.       Administrative Statements

## 2024-07-23 NOTE — ASSESSMENT & PLAN NOTE
We discussed her hypercalcemia over the past 2 years and elevated PTH.  She was referred to Endo in the past but never went.  We discussed the complications from hypercalcemia including osteoporosis and stones.  She also has some nonspecific abdominal pain.  Reentered referral to Endo.

## 2024-07-23 NOTE — ASSESSMENT & PLAN NOTE
Counseling for smoking cessation provided.  In the past she was given prescription of Wellbutrin but was not compliant.  She is interested in trying again.  SHe is interested in retrying Wellbutrin.  Prescription sent.

## 2024-07-24 ENCOUNTER — TELEPHONE (OUTPATIENT)
Dept: INTERNAL MEDICINE CLINIC | Facility: CLINIC | Age: 44
End: 2024-07-24

## 2024-07-24 DIAGNOSIS — Z11.1 SCREENING-PULMONARY TB: Primary | ICD-10-CM

## 2024-07-24 NOTE — TELEPHONE ENCOUNTER
Entered  Her form is in my bin  I will be away for the next 2 weeks  Once it's done can fill in and give back to her

## 2024-07-24 NOTE — TELEPHONE ENCOUNTER
I spoke to the patient and she states that her employer is unable to find the previous PPD documentation so she will need to have it sone again. She is requesting an order for the Quantiferon Gold test and will have done with her other labs. Please put in orders and once completed, we can fill out the necessary information on the form.

## 2024-07-24 NOTE — TELEPHONE ENCOUNTER
----- Message from Irma Alba MD sent at 7/23/2024  3:06 PM EDT -----  I have her adoption physical form ready in my bin.   Just waiting for her to send back her PPD result (done recently at [facility])  Once we receive that and put in the form, she could pick it up.

## 2024-08-07 DIAGNOSIS — B00.9 HERPES SIMPLEX TYPE 2 INFECTION: ICD-10-CM

## 2024-08-07 RX ORDER — VALACYCLOVIR HYDROCHLORIDE 500 MG/1
500 TABLET, FILM COATED ORAL 2 TIMES DAILY
Qty: 60 TABLET | Refills: 0 | Status: SHIPPED | OUTPATIENT
Start: 2024-08-07 | End: 2024-08-21

## 2024-09-09 ENCOUNTER — LAB (OUTPATIENT)
Dept: LAB | Facility: CLINIC | Age: 44
End: 2024-09-09
Payer: COMMERCIAL

## 2024-09-09 DIAGNOSIS — Z00.00 ANNUAL PHYSICAL EXAM: ICD-10-CM

## 2024-09-09 DIAGNOSIS — R10.9 ABDOMINAL PAIN: ICD-10-CM

## 2024-09-09 DIAGNOSIS — E53.8 FOLATE DEFICIENCY: ICD-10-CM

## 2024-09-09 DIAGNOSIS — Z11.1 SCREENING-PULMONARY TB: ICD-10-CM

## 2024-09-09 DIAGNOSIS — E53.8 B12 DEFICIENCY: ICD-10-CM

## 2024-09-09 DIAGNOSIS — E78.5 HYPERLIPIDEMIA, UNSPECIFIED HYPERLIPIDEMIA TYPE: ICD-10-CM

## 2024-09-09 DIAGNOSIS — E55.9 VITAMIN D DEFICIENCY: ICD-10-CM

## 2024-09-09 LAB
25(OH)D3 SERPL-MCNC: <7 NG/ML (ref 30–100)
ALBUMIN SERPL BCG-MCNC: 4 G/DL (ref 3.5–5)
ALP SERPL-CCNC: 117 U/L (ref 34–104)
ALT SERPL W P-5'-P-CCNC: 16 U/L (ref 7–52)
ANION GAP SERPL CALCULATED.3IONS-SCNC: 1 MMOL/L (ref 4–13)
AST SERPL W P-5'-P-CCNC: 11 U/L (ref 13–39)
BASOPHILS # BLD AUTO: 0.01 THOUSANDS/ÂΜL (ref 0–0.1)
BASOPHILS NFR BLD AUTO: 0 % (ref 0–1)
BILIRUB SERPL-MCNC: 0.48 MG/DL (ref 0.2–1)
BUN SERPL-MCNC: 7 MG/DL (ref 5–25)
CALCIUM SERPL-MCNC: 10.5 MG/DL (ref 8.4–10.2)
CHLORIDE SERPL-SCNC: 112 MMOL/L (ref 96–108)
CHOLEST SERPL-MCNC: 206 MG/DL
CO2 SERPL-SCNC: 27 MMOL/L (ref 21–32)
CREAT SERPL-MCNC: 0.64 MG/DL (ref 0.6–1.3)
EOSINOPHIL # BLD AUTO: 0.15 THOUSAND/ÂΜL (ref 0–0.61)
EOSINOPHIL NFR BLD AUTO: 4 % (ref 0–6)
ERYTHROCYTE [DISTWIDTH] IN BLOOD BY AUTOMATED COUNT: 15.9 % (ref 11.6–15.1)
FOLATE SERPL-MCNC: 5.7 NG/ML
GFR SERPL CREATININE-BSD FRML MDRD: 107 ML/MIN/1.73SQ M
GLUCOSE P FAST SERPL-MCNC: 88 MG/DL (ref 65–99)
HCT VFR BLD AUTO: 38.9 % (ref 34.8–46.1)
HDLC SERPL-MCNC: 43 MG/DL
HGB BLD-MCNC: 12.7 G/DL (ref 11.5–15.4)
IMM GRANULOCYTES # BLD AUTO: 0.01 THOUSAND/UL (ref 0–0.2)
IMM GRANULOCYTES NFR BLD AUTO: 0 % (ref 0–2)
LDLC SERPL CALC-MCNC: 146 MG/DL (ref 0–100)
LIPASE SERPL-CCNC: 14 U/L (ref 11–82)
LYMPHOCYTES # BLD AUTO: 1.11 THOUSANDS/ÂΜL (ref 0.6–4.47)
LYMPHOCYTES NFR BLD AUTO: 29 % (ref 14–44)
MCH RBC QN AUTO: 26.9 PG (ref 26.8–34.3)
MCHC RBC AUTO-ENTMCNC: 32.6 G/DL (ref 31.4–37.4)
MCV RBC AUTO: 82 FL (ref 82–98)
MONOCYTES # BLD AUTO: 0.31 THOUSAND/ÂΜL (ref 0.17–1.22)
MONOCYTES NFR BLD AUTO: 8 % (ref 4–12)
NEUTROPHILS # BLD AUTO: 2.23 THOUSANDS/ÂΜL (ref 1.85–7.62)
NEUTS SEG NFR BLD AUTO: 59 % (ref 43–75)
NRBC BLD AUTO-RTO: 0 /100 WBCS
PLATELET # BLD AUTO: 170 THOUSANDS/UL (ref 149–390)
PMV BLD AUTO: 12 FL (ref 8.9–12.7)
POTASSIUM SERPL-SCNC: 3.8 MMOL/L (ref 3.5–5.3)
PROT SERPL-MCNC: 6.4 G/DL (ref 6.4–8.4)
RBC # BLD AUTO: 4.72 MILLION/UL (ref 3.81–5.12)
SODIUM SERPL-SCNC: 140 MMOL/L (ref 135–147)
TRIGL SERPL-MCNC: 85 MG/DL
VIT B12 SERPL-MCNC: 115 PG/ML (ref 180–914)
WBC # BLD AUTO: 3.82 THOUSAND/UL (ref 4.31–10.16)

## 2024-09-09 PROCEDURE — 83690 ASSAY OF LIPASE: CPT

## 2024-09-09 PROCEDURE — 82746 ASSAY OF FOLIC ACID SERUM: CPT

## 2024-09-09 PROCEDURE — 82306 VITAMIN D 25 HYDROXY: CPT

## 2024-09-09 PROCEDURE — 36415 COLL VENOUS BLD VENIPUNCTURE: CPT

## 2024-09-09 PROCEDURE — 80061 LIPID PANEL: CPT

## 2024-09-09 PROCEDURE — 80053 COMPREHEN METABOLIC PANEL: CPT

## 2024-09-09 PROCEDURE — 85025 COMPLETE CBC W/AUTO DIFF WBC: CPT

## 2024-09-09 PROCEDURE — 86480 TB TEST CELL IMMUN MEASURE: CPT

## 2024-09-09 PROCEDURE — 83918 ORGANIC ACIDS TOTAL QUANT: CPT

## 2024-09-09 PROCEDURE — 82607 VITAMIN B-12: CPT

## 2024-09-10 ENCOUNTER — TELEPHONE (OUTPATIENT)
Dept: INTERNAL MEDICINE CLINIC | Facility: CLINIC | Age: 44
End: 2024-09-10

## 2024-09-10 DIAGNOSIS — E53.8 B12 DEFICIENCY: ICD-10-CM

## 2024-09-10 DIAGNOSIS — E55.9 VITAMIN D DEFICIENCY: Primary | ICD-10-CM

## 2024-09-10 DIAGNOSIS — E53.8 FOLATE DEFICIENCY: ICD-10-CM

## 2024-09-10 LAB
GAMMA INTERFERON BACKGROUND BLD IA-ACNC: 0 IU/ML
M TB IFN-G BLD-IMP: NEGATIVE
M TB IFN-G CD4+ BCKGRND COR BLD-ACNC: 0.01 IU/ML
M TB IFN-G CD4+ BCKGRND COR BLD-ACNC: 0.02 IU/ML
MITOGEN IGNF BCKGRD COR BLD-ACNC: 10 IU/ML

## 2024-09-10 RX ORDER — LANOLIN ALCOHOL/MO/W.PET/CERES
1000 CREAM (GRAM) TOPICAL DAILY
Qty: 30 TABLET | Refills: 1 | Status: SHIPPED | OUTPATIENT
Start: 2024-09-10

## 2024-09-10 NOTE — RESULT ENCOUNTER NOTE
Staff please schedule a visit with Dr. Reyes in 2-3 months    Lab showed deficiency in several vitamin levels.   Vit D deficiency: start taking 50,000 IU weekly for the next 8 weeks.   B12 deficiency: start taking B12 1000mcg daily.   Folate deficiency: start taking folate supplement 400mcg daily.     Recheck level after 2-3 months. Order entered.   Then follow up with Dr. Reyes.   If b12 level still low could consider getting B12 injection in office.

## 2024-09-11 ENCOUNTER — CONSULT (OUTPATIENT)
Dept: ENDOCRINOLOGY | Facility: CLINIC | Age: 44
End: 2024-09-11
Payer: COMMERCIAL

## 2024-09-11 ENCOUNTER — TELEPHONE (OUTPATIENT)
Age: 44
End: 2024-09-11

## 2024-09-11 VITALS
HEART RATE: 83 BPM | HEIGHT: 67 IN | BODY MASS INDEX: 34.53 KG/M2 | OXYGEN SATURATION: 99 % | SYSTOLIC BLOOD PRESSURE: 120 MMHG | DIASTOLIC BLOOD PRESSURE: 80 MMHG | WEIGHT: 220 LBS

## 2024-09-11 DIAGNOSIS — E66.09 CLASS 2 OBESITY DUE TO EXCESS CALORIES WITHOUT SERIOUS COMORBIDITY WITH BODY MASS INDEX (BMI) OF 35.0 TO 35.9 IN ADULT: ICD-10-CM

## 2024-09-11 DIAGNOSIS — E21.3 HYPERPARATHYROIDISM (HCC): ICD-10-CM

## 2024-09-11 DIAGNOSIS — E55.9 VITAMIN D DEFICIENCY: ICD-10-CM

## 2024-09-11 DIAGNOSIS — E83.52 HYPERCALCEMIA: Primary | ICD-10-CM

## 2024-09-11 PROCEDURE — 99204 OFFICE O/P NEW MOD 45 MIN: CPT | Performed by: INTERNAL MEDICINE

## 2024-09-11 NOTE — PROGRESS NOTES
Michelle Costa 44 y.o. female MRN: [id number]    Encounter: [id number]      Assessment & Plan     Assessment:  This is a 44 y.o.-year-old female with vitamin D deficiency, primary hyperparathyroidism    Plan:  Diagnoses and all orders for this visit:    Hypercalcemia  Patient has elevated calcium, elevated PTH which is suggestive of primary hyperparathyroidism.  Also ultrasound of thyroid showed suspicious finding of parathyroid adenoma.  We discussed about pathophysiology of primary hyperparathyroidism, clinical manifestations, long-term implications, treatment/management plan, option of surgical management as well as conservative management  Considering patient's age as well as elevated calcium, she would need parathyroid adenoma surgery  Will confirm primary hyperparathyroidism by doing 24-hour urine calcium and creatinine excretion ratio.  Will also obtain parathyroid scan to localize parathyroid adenoma  Repeat blood work    Will make a referral for surgical oncology once we have although workup results back  -     Calcium, urine, 24 hour; Future  -     Creatinine, urine, 24 hour; Future  -     Calcium, ionized; Future  -     Protein electrophoresis, serum; Future  -     NM parathyroid scan w spect; Future  -     Basic metabolic panel; Future  -     PTH, intact; Future    Hyperparathyroidism (HCC)  Obtain following blood work and imaging  -     Ambulatory Referral to Endocrinology  -     PTH, intact; Future  -     Protein electrophoresis, serum; Future  -     NM parathyroid scan w spect; Future  -     PTH, intact; Future    Class 2 obesity due to excess calories without serious comorbidity with body mass index (BMI) of 35.0 to 35.9 in adult  Patient educated on the importance of weight loss and benefits of portion control and dieting.Encouraged whole foods/Mediterranean diet, increasing consumption of vegetables, and avoiding processed/packaged foods/carbs.Patient also educated on the importance of exercise.  Encouraged to engage in moderate intensity exercise for 30-50 min at least 3-5 times per week.    Vitamin D deficiency  Patient was started on 50,000 IU vitamin D2, once a week.  Discussed to complete 4-week course and then start taking vitamin D3 supplementation 2000 IU daily  Also discussed to obtain vitamin D level in 6 weeks  -     Vitamin D 25 hydroxy; Future  -     Vitamin D 25 hydroxy; Future         CC:   Primary  hyperparathyroidism, hypercalcemia    History of Present Illness     HPI:    Michelle Costa is a 44-year-old woman with medical history of hyperparathyroidism, hypercalcemia, vitamin D deficiency is referred here by primary care physician for evaluation of hypercalcemia and elevated PTH.  Review of record shows patient has elevated calcium in the range of 10.5 to 10.9 mg per DL range for past 2 years.  She never had parathyroid hormone level done  She denies history of kidney stones, she denies history of fracture bones  Menstrual period's are regular.  She denies family history of hypercalcemia    Patient had thyroid ultrasound done in October 2023 which showed hypoechoic nodule seen inferior to left thyroid lobe measuring up to 2.5 cm.  This may represent parathyroid adenoma.  Thyroid nodules were seen in right lobe, left lobe or isthmus    Lab Results   Component Value Date    CALCIUM 10.5 (H) 09/09/2024          Review of Systems   Constitutional:  Negative for activity change, diaphoresis, fatigue, fever and unexpected weight change.   HENT: Negative.     Eyes:  Negative for visual disturbance.   Respiratory:  Negative for cough, chest tightness and shortness of breath.    Cardiovascular:  Negative for chest pain, palpitations and leg swelling.   Gastrointestinal:  Negative for abdominal pain, blood in stool, constipation, diarrhea, nausea and vomiting.   Endocrine: Negative for cold intolerance, heat intolerance, polydipsia, polyphagia and polyuria.   Genitourinary:  Negative for dysuria,  enuresis, frequency and urgency.   Musculoskeletal:  Negative for arthralgias and myalgias.   Skin:  Negative for pallor, rash and wound.   Allergic/Immunologic: Negative.    Neurological:  Negative for dizziness, tremors, weakness and numbness.   Hematological: Negative.    Psychiatric/Behavioral: Negative.         Historical Information   Past Medical History:   Diagnosis Date    Acute non-recurrent maxillary sinusitis 01/27/2020    Allergic     Seasonal    Anxiety     Bronchitis     Disease of thyroid gland 2023    Encounter for consultation 08/18/2020    Exposure to COVID-19 virus 11/24/2020    GERD (gastroesophageal reflux disease)     Kidney stone     Ovarian cyst     Pancreatitis     Psychiatric disorder     Upper respiratory tract infection 10/18/2019     Past Surgical History:   Procedure Laterality Date    APPENDECTOMY      CHOLECYSTECTOMY  2/26/24    GALLBLADDER SURGERY  02/2024    removal    [place] LAPAROSCOPY SURG CHOLECYSTECTOMY N/A 02/26/2024    Procedure: CHOLECYSTECTOMY LAPAROSCOPIC;  Surgeon: Angel Mera DO;  Location: [facility] MAIN OR;  Service: General    TUBAL LIGATION       Social History   Social History     Substance and Sexual Activity   Alcohol Use Yes    Comment: occassional     Social History     Substance and Sexual Activity   Drug Use No     Social History     Tobacco Use   Smoking Status Every Day    Current packs/day: 0.50    Average packs/day: 0.7 packs/day for 33.0 years (22.0 ttl pk-yrs)    Types: Cigarettes    Passive exposure: Current   Smokeless Tobacco Never     Family History:   Family History   Problem Relation Age of Onset    Depression Family     Diabetes Family     Hypertension Family     Schizophrenia Family     No Known Problems Mother     Alcohol abuse Father     Substance Abuse Father     Mental illness Father     Depression Father     Diabetes Father     Schizoaffective Disorder  Father     No Known Problems Sister     No Known Problems Daughter     No Known  "Problems Maternal Grandmother     No Known Problems Maternal Grandfather     No Known Problems Paternal Grandmother     No Known Problems Paternal Grandfather     No Known Problems Son     No Known Problems Son     No Known Problems Son     No Known Problems Paternal Aunt        Meds/Allergies   Current Outpatient Medications   Medication Sig Dispense Refill    buPROPion (WELLBUTRIN XL) 150 mg 24 hr tablet Take 1 tablet (150 mg total) by mouth every morning 30 tablet 5    cholecalciferol (VITAMIN D3) 250 MCG (37743 UT) capsule Take 5 capsules (50,000 Units total) by mouth once a week for 8 doses 20 capsule 1    nystatin (MYCOSTATIN) cream Apply 1 Application topically 2 (two) times a day To affected area      triamcinolone (KENALOG) 0.1 % ointment APPLY TWICE A DAY AS NEEDED FOR UP TO 2 WEEKS      valACYclovir (VALTREX) 500 mg tablet Take 1 tablet (500 mg total) by mouth 2 (two) times a day for 14 days 60 tablet 0    vitamin B-12 (VITAMIN B-12) 1,000 mcg tablet Take 1 tablet (1,000 mcg total) by mouth daily 30 tablet 1     No current facility-administered medications for this visit.     Allergies   Allergen Reactions    Mucinex Cough For Kids [Dextromethorphan-Guaifenesin] Other (See Comments)     Per verbal of pt \"It causes body aches\"    Other Allergic Rhinitis       Objective   Vitals: Blood pressure 120/80, pulse 83, height 5' 7\" (1.702 m), weight 99.8 kg (220 lb), SpO2 99%, not currently breastfeeding.    Physical Exam  Vitals reviewed.   Constitutional:       General: She is not in acute distress.     Appearance: Normal appearance. She is not ill-appearing.   HENT:      Head: Normocephalic and atraumatic.      Nose: Nose normal.   Eyes:      Extraocular Movements: Extraocular movements intact.      Conjunctiva/sclera: Conjunctivae normal.   Pulmonary:      Effort: No respiratory distress.   Musculoskeletal:      Cervical back: Normal range of motion.   Neurological:      General: No focal deficit present.      " "Mental Status: She is alert and oriented to person, place, and time.   Psychiatric:         Mood and Affect: Mood normal.         Behavior: Behavior normal.         The history was obtained from the review of the chart, patient.    Lab Results:        Imaging Studies:       Reviewed radiology reports from this admission including: Ultrasound(s).    Portions of the record may have been created with voice recognition software. Occasional wrong word or \"sound a like\" substitutions may have occurred due to the inherent limitations of voice recognition software. Read the chart carefully and recognize, using context, where substitutions have occurred.    "

## 2024-09-11 NOTE — PATIENT INSTRUCTIONS
Take vitamin D, 50,000 IU once a week for 4 weeks, and then start vitamin D3, 2000 IU daily   Please  get the blood work done in 1 month  Please schedule parathyroid scan soon as possible

## 2024-09-11 NOTE — TELEPHONE ENCOUNTER
Patient calling to see if the phys form from [employer] she dropped off- can be uploaded and sent via my chart, she will also be sending a message with another phys form today.  Please advise

## 2024-09-13 LAB — METHYLMALONATE SERPL-SCNC: 108 NMOL/L (ref 0–378)

## 2024-12-26 ENCOUNTER — HOSPITAL ENCOUNTER (EMERGENCY)
Facility: HOSPITAL | Age: 44
Discharge: HOME | End: 2024-12-26
Attending: INTERNAL MEDICINE
Payer: COMMERCIAL

## 2024-12-26 VITALS
WEIGHT: 226 LBS | BODY MASS INDEX: 35.47 KG/M2 | HEART RATE: 85 BPM | RESPIRATION RATE: 18 BRPM | SYSTOLIC BLOOD PRESSURE: 192 MMHG | DIASTOLIC BLOOD PRESSURE: 81 MMHG | TEMPERATURE: 98.1 F | HEIGHT: 67 IN | OXYGEN SATURATION: 100 %

## 2024-12-26 DIAGNOSIS — N39.0 UTI (URINARY TRACT INFECTION): ICD-10-CM

## 2024-12-26 DIAGNOSIS — L05.01 PILONIDAL ABSCESS: Primary | ICD-10-CM

## 2024-12-26 LAB
BACTERIA UR QL AUTO: ABNORMAL /HPF
BILIRUB UR QL STRIP: NEGATIVE
CLARITY UR: ABNORMAL
COLOR UR: YELLOW
GLUCOSE UR STRIP-MCNC: NEGATIVE MG/DL
HGB UR QL STRIP.AUTO: NEGATIVE
KETONES UR STRIP-MCNC: NEGATIVE MG/DL
LEUKOCYTE ESTERASE UR QL STRIP: ABNORMAL
MUCOUS THREADS UR QL AUTO: ABNORMAL
NITRITE UR QL STRIP: POSITIVE
NON-SQ EPI CELLS URNS QL MICRO: ABNORMAL /HPF
PH UR STRIP.AUTO: 6.5 [PH]
PROT UR STRIP-MCNC: NEGATIVE MG/DL
RBC #/AREA URNS AUTO: ABNORMAL /HPF
SP GR UR STRIP.AUTO: 1.01 (ref 1–1.03)
UROBILINOGEN UR QL STRIP.AUTO: 2 E.U./DL
WBC #/AREA URNS AUTO: ABNORMAL /HPF

## 2024-12-26 PROCEDURE — 99284 EMERGENCY DEPT VISIT MOD MDM: CPT | Performed by: INTERNAL MEDICINE

## 2024-12-26 PROCEDURE — 87077 CULTURE AEROBIC IDENTIFY: CPT | Performed by: INTERNAL MEDICINE

## 2024-12-26 PROCEDURE — 81001 URINALYSIS AUTO W/SCOPE: CPT | Performed by: INTERNAL MEDICINE

## 2024-12-26 PROCEDURE — 99283 EMERGENCY DEPT VISIT LOW MDM: CPT

## 2024-12-26 PROCEDURE — 87186 SC STD MICRODIL/AGAR DIL: CPT | Performed by: INTERNAL MEDICINE

## 2024-12-26 PROCEDURE — 87086 URINE CULTURE/COLONY COUNT: CPT | Performed by: INTERNAL MEDICINE

## 2024-12-26 RX ORDER — IBUPROFEN 600 MG/1
600 TABLET, FILM COATED ORAL EVERY 6 HOURS PRN
Qty: 30 TABLET | Refills: 0 | Status: SHIPPED | OUTPATIENT
Start: 2024-12-26

## 2024-12-26 RX ADMIN — AMOXICILLIN AND CLAVULANATE POTASSIUM 1 TABLET: 875; 125 TABLET, FILM COATED ORAL at 23:00

## 2024-12-27 NOTE — ED PROVIDER NOTES
Time reflects when diagnosis was documented in both MDM as applicable and the Disposition within this note       Time User Action Codes Description Comment    2024 10:46 PM Cecilia Graves Add [L05.01] Pilonidal abscess     2024 10:47 PM Cecilia Graves Add [N39.0] UTI (urinary tract infection)           ED Disposition       ED Disposition   Discharge    Condition   Stable    Date/Time   Thu Dec 26, 2024 10:56 PM    Comment   Michelle Costa discharge to home/self care.                   Assessment & Plan       Medical Decision Making  This is 44 years old came for having painful area at the lower back midline for 5 days.  Patient cannot sit because of the pain.  Patient denies any fever and denies being diabetic.  Patient has no previous abscesses in this area.  Patient also complaining of polyuria and foul-smelling urine.  Patient has no abdominal pain no flank pain and she denies nausea vomiting diarrhea.  Physical exam is pertinent for; At the  cleft; there is an area of induration, tenderness, redness, and its about 2 inches x 1 inch.  There is no discharge.  And it is taking very tender.  This is likely a pilonidal abscess.  Discussed with the patient that this abscess needs I&D but she refused and states that she cannot tolerate the needles.  I have explained all the risks of leaving it like this including sepsis fever increase the size of the abscess but patient insisted on refusing and stated that she will try antibiotic.  Pt instructed to sit in sitz bath, and follow up with surgery for likely need I&D.  Pt given a script for Augmentin for 7 days. Which cover infection and UTI.     UA ; WBC 20-30, + Nitrites, + leukocyte.         Amount and/or Complexity of Data Reviewed  Labs: ordered.     Details: UA ; WBC 20-30, + Nitrites, + leukocyte.         Risk  Prescription drug management.             Medications   amoxicillin-clavulanate (AUGMENTIN) 875-125 mg per tablet 1 tablet (1 tablet Oral  Given 12/26/24 2300)       ED Risk Strat Scores                          SBIRT 22yo+      Flowsheet Row Most Recent Value   Initial Alcohol Screen: US AUDIT-C     1. How often do you have a drink containing alcohol? 0 Filed at: 12/26/2024 2225   2. How many drinks containing alcohol do you have on a typical day you are drinking?  0 Filed at: 12/26/2024 2225   3b. FEMALE Any Age, or MALE 65+: How often do you have 4 or more drinks on one occassion? 0 Filed at: 12/26/2024 2225   Audit-C Score 0 Filed at: 12/26/2024 2225   TERRELL: How many times in the past year have you...    Used an illegal drug or used a prescription medication for non-medical reasons? Never Filed at: 12/26/2024 2225                            History of Present Illness       Chief Complaint   Patient presents with    Abscess     Here for possible abscess in gluteal cleft, pt first noticed issue this past Monday but it has worsened since then; denies fevers     Possible UTI     Pt reports increased urinary frequency and foul-smelling urine since this last week       Past Medical History:   Diagnosis Date    Acute non-recurrent maxillary sinusitis 01/27/2020    Allergic     Seasonal    Anxiety     Bronchitis     Disease of thyroid gland 2023    Encounter for consultation 08/18/2020    Exposure to COVID-19 virus 11/24/2020    GERD (gastroesophageal reflux disease)     Kidney stone     Ovarian cyst     Pancreatitis     Psychiatric disorder     Upper respiratory tract infection 10/18/2019      Past Surgical History:   Procedure Laterality Date    APPENDECTOMY      CHOLECYSTECTOMY  2/26/24    GALLBLADDER SURGERY  02/2024    removal    [place] LAPAROSCOPY SURG CHOLECYSTECTOMY N/A 02/26/2024    Procedure: CHOLECYSTECTOMY LAPAROSCOPIC;  Surgeon: Angel Mera DO;  Location: [facility] MAIN OR;  Service: General    TUBAL LIGATION        Family History   Problem Relation Age of Onset    Depression Family     Diabetes Family     Hypertension Family      Schizophrenia Family     No Known Problems Mother     Alcohol abuse Father     Substance Abuse Father     Mental illness Father     Depression Father     Diabetes Father     Schizoaffective Disorder  Father     No Known Problems Sister     No Known Problems Daughter     No Known Problems Maternal Grandmother     No Known Problems Maternal Grandfather     No Known Problems Paternal Grandmother     No Known Problems Paternal Grandfather     No Known Problems Son     No Known Problems Son     No Known Problems Son     No Known Problems Paternal Aunt       Social History     Tobacco Use    Smoking status: Every Day     Current packs/day: 0.50     Average packs/day: 0.7 packs/day for 33.0 years (22.0 ttl pk-yrs)     Types: Cigarettes     Passive exposure: Current    Smokeless tobacco: Never   Vaping Use    Vaping status: Never Used   Substance Use Topics    Alcohol use: Yes     Comment: occassional    Drug use: No      E-Cigarette/Vaping    E-Cigarette Use Never User       E-Cigarette/Vaping Substances    Nicotine No     THC No     CBD No     Flavoring No       I have reviewed and agree with the history as documented.     This is a 44 years old came for having been at the midline lower gluteal area since 4 days.  Patient stated that pain makes sitting is painful.  Patient denies any fever.  Patient denies trauma.  Patient denies being diabetic.  Patient also has polyuria and that the urine smells bad.  Patient denies dysuria hematuria.  Patient denies abdominal pain.  Patient denies nausea vomiting diarrhea.  Patient has no flank pain.  Patient has history of cholecystectomy, appendectomy, tubal ligation.  Patient in no distress.      History provided by:  Patient   used: No    Abscess  Location:  Torso  Torso abscess location:  Lower back  Abscess quality: induration, painful and warmth    Red streaking: no    Duration:  5 days  Progression:  Worsening  Pain details:     Quality:  Throbbing     Severity:  Moderate    Timing:  Constant  Chronicity:  New  Worsened by:  Nothing  Ineffective treatments:  None tried  Associated symptoms: no fever and no vomiting        Review of Systems   Constitutional:  Negative for chills and fever.   HENT:  Negative for congestion, ear pain and sore throat.    Eyes:  Negative for pain and visual disturbance.   Respiratory:  Negative for cough and shortness of breath.    Cardiovascular:  Negative for chest pain and palpitations.   Gastrointestinal:  Negative for abdominal pain and vomiting.   Genitourinary:  Negative for dysuria and hematuria.   Musculoskeletal:  Negative for arthralgias and back pain.   Skin:  Negative for color change and rash.   Neurological:  Negative for seizures and syncope.   Hematological:  Negative for adenopathy. Does not bruise/bleed easily.   Psychiatric/Behavioral:  Negative for agitation and behavioral problems.    All other systems reviewed and are negative.          Objective       ED Triage Vitals [12/26/24 2208]   Temperature Pulse Blood Pressure Respirations SpO2 Patient Position - Orthostatic VS   98.1 °F (36.7 °C) 85 (!) 192/81 18 100 % Sitting      Temp Source Heart Rate Source BP Location FiO2 (%) Pain Score    Oral Monitor Right arm -- 6      Vitals      Date and Time Temp Pulse SpO2 Resp BP Pain Score FACES Pain Rating User   12/26/24 2208 98.1 °F (36.7 °C) 85 100 % 18 192/81 6 -- KLB            Physical Exam  Vitals and nursing note reviewed.   Constitutional:       General: She is not in acute distress.     Appearance: Normal appearance. She is well-developed. She is not ill-appearing, toxic-appearing or diaphoretic.   HENT:      Head: Normocephalic and atraumatic.      Right Ear: Ear canal normal.      Left Ear: Ear canal normal.      Nose: Nose normal. No congestion or rhinorrhea.      Mouth/Throat:      Mouth: Mucous membranes are moist.   Eyes:      Conjunctiva/sclera: Conjunctivae normal.   Neck:      Vascular: No carotid  bruit.   Cardiovascular:      Rate and Rhythm: Normal rate and regular rhythm.      Heart sounds: No murmur heard.     No friction rub. No gallop.   Pulmonary:      Effort: Pulmonary effort is normal. No respiratory distress.      Breath sounds: Normal breath sounds. No stridor. No wheezing, rhonchi or rales.   Chest:      Chest wall: No tenderness.   Abdominal:      General: Abdomen is flat. There is no distension.      Palpations: Abdomen is soft. There is no mass.      Tenderness: There is no abdominal tenderness.   Musculoskeletal:         General: No swelling, tenderness, deformity or signs of injury.      Cervical back: Normal range of motion and neck supple. No rigidity or tenderness.      Right lower leg: No edema.      Left lower leg: No edema.   Lymphadenopathy:      Cervical: No cervical adenopathy.   Skin:     General: Skin is warm and dry.      Capillary Refill: Capillary refill takes less than 2 seconds.      Coloration: Skin is not jaundiced or pale.      Findings: No bruising, erythema, lesion or rash.      Comments: At the  cleft; there is an area of induration, tenderness, redness, and its about 2 inches x 1 inch.  There is no discharge.  And it is taking very tender.  This is likely a pilonidal abscess.   Neurological:      Mental Status: She is alert and oriented to person, place, and time.   Psychiatric:         Mood and Affect: Mood normal.         Results Reviewed       Procedure Component Value Units Date/Time    Urine culture [[id number]]  (Abnormal) Collected: 24    Lab Status: Preliminary result Specimen: Urine, Clean Catch Updated: 24 0819     Urine Culture >100,000 cfu/ml Gram Negative Bandar    Urine Microscopic [[id number]]  (Abnormal) Collected: 24    Lab Status: Final result Specimen: Urine, Clean Catch Updated: 24 2250     RBC, UA 0-5 /hpf      WBC, UA 20-30 /hpf      Epithelial Cells Moderate /hpf      Bacteria, UA Innumerable /hpf      MUCUS  THREADS Occasional    UA w Reflex to Microscopic w Reflex to Culture [[id number]]  (Abnormal) Collected: 12/26/24 2229    Lab Status: Final result Specimen: Urine, Clean Catch Updated: 12/26/24 2240     Color, UA Yellow     Clarity, UA Slightly Cloudy     Specific Gravity, UA 1.015     pH, UA 6.5     Leukocytes, UA 1+     Nitrite, UA Positive     Protein, UA Negative mg/dl      Glucose, UA Negative mg/dl      Ketones, UA Negative mg/dl      Urobilinogen, UA 2.0 E.U./dl      Bilirubin, UA Negative     Occult Blood, UA Negative            No orders to display       Procedures    ED Medication and Procedure Management   Prior to Admission Medications   Prescriptions Last Dose Informant Patient Reported? Taking?   buPROPion (WELLBUTRIN XL) 150 mg 24 hr tablet   No No   Sig: Take 1 tablet (150 mg total) by mouth every morning   cholecalciferol (VITAMIN D3) 250 MCG (46375 UT) capsule   No No   Sig: Take 5 capsules (50,000 Units total) by mouth once a week for 8 doses   nystatin (MYCOSTATIN) cream  Self Yes No   Sig: Apply 1 Application topically 2 (two) times a day To affected area   triamcinolone (KENALOG) 0.1 % ointment  Self Yes No   Sig: APPLY TWICE A DAY AS NEEDED FOR UP TO 2 WEEKS   valACYclovir (VALTREX) 500 mg tablet   No No   Sig: Take 1 tablet (500 mg total) by mouth 2 (two) times a day for 14 days   vitamin B-12 (VITAMIN B-12) 1,000 mcg tablet   No No   Sig: Take 1 tablet (1,000 mcg total) by mouth daily      Facility-Administered Medications: None     Discharge Medication List as of 12/26/2024 10:56 PM        START taking these medications    Details   amoxicillin-clavulanate (AUGMENTIN) 875-125 mg per tablet Take 1 tablet by mouth every 12 (twelve) hours for 7 days, Starting Thu 12/26/2024, Until Thu 1/2/2025, Normal      ibuprofen (MOTRIN) 600 mg tablet Take 1 tablet (600 mg total) by mouth every 6 (six) hours as needed for moderate pain, Starting Thu 12/26/2024, Normal           CONTINUE these medications  which have NOT CHANGED    Details   buPROPion (WELLBUTRIN XL) 150 mg 24 hr tablet Take 1 tablet (150 mg total) by mouth every morning, Starting Mon 7/22/2024, Until Sat 1/18/2025, Normal      cholecalciferol (VITAMIN D3) 250 MCG (81941 UT) capsule Take 5 capsules (50,000 Units total) by mouth once a week for 8 doses, Starting Tue 9/10/2024, Until Wed 10/30/2024, Normal      nystatin (MYCOSTATIN) cream Apply 1 Application topically 2 (two) times a day To affected area, Starting Sun 11/26/2023, Historical Med      triamcinolone (KENALOG) 0.1 % ointment APPLY TWICE A DAY AS NEEDED FOR UP TO 2 WEEKS, Historical Med      valACYclovir (VALTREX) 500 mg tablet Take 1 tablet (500 mg total) by mouth 2 (two) times a day for 14 days, Starting Wed 8/7/2024, Until Wed 9/11/2024, Normal      vitamin B-12 (VITAMIN B-12) 1,000 mcg tablet Take 1 tablet (1,000 mcg total) by mouth daily, Starting Tue 9/10/2024, Normal           No discharge procedures on file.  ED SEPSIS DOCUMENTATION   Time reflects when diagnosis was documented in both MDM as applicable and the Disposition within this note       Time User Action Codes Description Comment    12/26/2024 10:46 PM Cecilia Graves [L05.01] Pilonidal abscess     12/26/2024 10:47 PM Cecilia Graves [N39.0] UTI (urinary tract infection)                  Cecilia Graves MD  12/28/24 0297

## 2024-12-27 NOTE — DISCHARGE INSTRUCTIONS
Follow up with surgery .  Sit in sitz bath twice daily  Follow up with your PMD.  TAKE medications as prescribed.

## 2024-12-29 DIAGNOSIS — B00.9 HERPES SIMPLEX TYPE 2 INFECTION: ICD-10-CM

## 2024-12-29 DIAGNOSIS — E53.8 B12 DEFICIENCY: ICD-10-CM

## 2024-12-29 LAB — BACTERIA UR CULT: ABNORMAL

## 2024-12-30 RX ORDER — OMEGA-3/DHA/EPA/FISH OIL 35-113.5MG
1000 TABLET,CHEWABLE ORAL DAILY
Qty: 30 TABLET | Refills: 5 | Status: SHIPPED | OUTPATIENT
Start: 2024-12-30

## 2024-12-30 RX ORDER — VALACYCLOVIR HYDROCHLORIDE 500 MG/1
TABLET, FILM COATED ORAL
Qty: 60 TABLET | Refills: 0 | Status: SHIPPED | OUTPATIENT
Start: 2024-12-30 | End: 2025-01-13

## 2025-03-04 ENCOUNTER — APPOINTMENT (EMERGENCY)
Dept: VASCULAR ULTRASOUND | Facility: HOSPITAL | Age: 45
End: 2025-03-04
Payer: COMMERCIAL

## 2025-03-04 ENCOUNTER — HOSPITAL ENCOUNTER (EMERGENCY)
Facility: HOSPITAL | Age: 45
Discharge: HOME | End: 2025-03-04
Attending: EMERGENCY MEDICINE
Payer: COMMERCIAL

## 2025-03-04 VITALS
RESPIRATION RATE: 19 BRPM | DIASTOLIC BLOOD PRESSURE: 99 MMHG | OXYGEN SATURATION: 100 % | HEART RATE: 102 BPM | TEMPERATURE: 98.3 F | SYSTOLIC BLOOD PRESSURE: 137 MMHG

## 2025-03-04 DIAGNOSIS — R60.0 LEG EDEMA, LEFT: Primary | ICD-10-CM

## 2025-03-04 PROCEDURE — 99284 EMERGENCY DEPT VISIT MOD MDM: CPT | Performed by: EMERGENCY MEDICINE

## 2025-03-04 PROCEDURE — 93971 EXTREMITY STUDY: CPT | Performed by: SURGERY

## 2025-03-04 PROCEDURE — 93971 EXTREMITY STUDY: CPT

## 2025-03-04 PROCEDURE — 99283 EMERGENCY DEPT VISIT LOW MDM: CPT

## 2025-03-04 NOTE — Clinical Note
Michelle Costa was seen and treated in our emergency department on 3/4/2025.                Diagnosis:     Michelle  may return to work on return date.    She may return on this date: 03/05/2025         If you have any questions or concerns, please don't hesitate to call.      Kay Cantrell MD    ______________________________           _______________          _______________  Hospital Representative                              Date                                Time

## 2025-03-04 NOTE — ED ATTENDING ATTESTATION
3/4/2025  I, Dickson Coned MD, saw and evaluated the patient. I have discussed the patient with the resident/non-physician practitioner and agree with the resident's/non-physician practitioner's findings, Plan of Care, and MDM as documented in the resident's/non-physician practitioner's note, except where noted. All available labs and Radiology studies were reviewed.  I was present for key portions of any procedure(s) performed by the resident/non-physician practitioner and I was immediately available to provide assistance.       At this point I agree with the current assessment done in the Emergency Department.  I have conducted an independent evaluation of this patient a history and physical is as follows:    S:  Chief Complaint   Patient presents with    Leg Swelling     L lower leg swelling and L foot swelling. Has been occurring for weeks. +painful, no redness. +knee pain, no calf pain.      Michelle is a 44 y.o. female who presents with the chief complaint of left lower leg and foot swelling.  Onset was 2 weeks ago.  This has progressively worsened.  She is reporting calf pain (despite triage note).  No chest pain, no shortness of breath.  She has no history of similar symptoms.  No dvt history.  She has no significant past medical history.       O:  ED Triage Vitals [03/04/25 1114]   Temperature Pulse Respirations Blood Pressure SpO2   98.3 °F (36.8 °C) 102 19 137/99 100 %      Temp Source Heart Rate Source Patient Position - Orthostatic VS BP Location FiO2 (%)   Oral Monitor Sitting Right arm --      Pain Score       --         Physical Exam  Vitals and nursing note reviewed.   Constitutional:       General: She is in acute distress.      Appearance: She is well-developed.   HENT:      Head: Normocephalic and atraumatic.   Eyes:      Pupils: Pupils are equal, round, and reactive to light.   Neck:      Vascular: No JVD.   Cardiovascular:      Rate and Rhythm: Normal rate and regular rhythm.      Heart  sounds: Normal heart sounds. No murmur heard.     No friction rub. No gallop.   Pulmonary:      Effort: Pulmonary effort is normal. No respiratory distress.      Breath sounds: Normal breath sounds. No wheezing or rales.   Chest:      Chest wall: No tenderness.   Musculoskeletal:         General: Tenderness (left calf) present. No deformity or signs of injury. Normal range of motion.      Cervical back: Normal range of motion.      Left lower leg: Edema present.   Skin:     General: Skin is warm and dry.   Neurological:      General: No focal deficit present.      Mental Status: She is alert and oriented to person, place, and time.   Psychiatric:         Behavior: Behavior normal.         Thought Content: Thought content normal.         Judgment: Judgment normal.       A/P:  44 y.o. female presents with chief complaint of left  calf pain and swelling.    Differential: dvt, superficial thrombophlebitis, baker's cyst, musculoskeletal calf pain, no evidence for cellulitis, possible lymphadenopathy    Will obtain duplex ultrasound      ED Course  ED Course as of 03/04/25 1846   Tue Mar 04, 2025   1220 Performing ultrasonographer reports that the duplex is negative for dvt, baker's cyst or superficial thrombophlebitis.      VAS VENOUS DUPLEX -LOWER LIMB UNILATERAL   Final Result            Critical Care Time  Procedures    Time reflects when diagnosis was documented in both MDM as applicable and the Disposition within this note       Time User Action Codes Description Comment    3/4/2025 12:22 PM Dickson Conde Add [R60.0] Leg edema, left           ED Disposition       ED Disposition   Discharge    Condition   Stable    Date/Time   Tue Mar 4, 2025 12:22 PM    Comment   Michelle Costa discharge to home/self care.                   Follow-up Information       Follow up With Specialties Details Why Contact Info Additional Information     [facility] Emergency Department Emergency Medicine Go to  As needed, If  symptoms worsen [address] [facility] [id number] [phone] [facility] Emergency Department, [address] [place] [place], [place], [address]

## 2025-03-04 NOTE — DISCHARGE INSTRUCTIONS
Your work-up today was negative for any blood clots in your left leg.    Please follow-up with vascular surgery (referral provided) for outpatient follow-up.

## 2025-03-04 NOTE — ED NOTES
PT awake and alert, no distress noted. No other questions upon d/c.     Angie Louise, RICKY  03/04/25 1524

## 2025-03-04 NOTE — ED PROVIDER NOTES
Time reflects when diagnosis was documented in both MDM as applicable and the Disposition within this note       Time User Action Codes Description Comment    3/4/2025 12:22 PM Dickson Conde Add [R60.0] Leg edema, left           ED Disposition       ED Disposition   Discharge    Condition   Stable    Date/Time   Tue Mar 4, 2025 12:22 PM    Comment   Michelle Costa discharge to home/self care.                   Assessment & Plan       Medical Decision Making  44-year-old female with no relevant PMH presenting for left lower leg swelling and pain for the past 2 weeks.      Differential diagnoses: DVT, superficial thrombophlebitis, Baker's cyst, doubt cellulitis or arterial occlusion.    Patient presents mildly tachycardic, but otherwise hemodynamically stable.  Denies need for any pain medication at this time.  Wells DVT score of 2 indicating moderate risk.  Left lower extremity duplex ultrasound negative for DVT, superficial thrombophlebitis, or Baker's cyst.  Discussed results with the patient.  Unable to provide a definitive answer for the patient's symptoms, however ruled out any emergent abnormalities requiring immediate intervention at this time.  Patient stable and comfortable with discharge home with an outpatient referral to vascular surgery.    Amount and/or Complexity of Data Reviewed  Radiology:  Decision-making details documented in ED Course.        ED Course as of 03/04/25 2014   Tue Mar 04, 2025   1155 Temperature: 98.3 °F (36.8 °C)  Patient presents afebrile and mildly tachycardic, but otherwise hemodynamically stable.   1156 Pulse: 102   1156 Denies any need for any pain medication at this time.   1234 VAS VENOUS DUPLEX -LOWER LIMB UNILATERAL  Negative for DVT, superficial thromphlebitis, and bakers' cyts.        Medications - No data to display    ED Risk Strat Scores                                  Wells' Criteria for DVT      Flowsheet Row Most Recent Value   Wells' Criteria for DVT    Active  cancer Treatment or palliation within 6 months 0 Filed at: 03/04/2025 1200   Bedridden recently >3 days or major surgery within 12 weeks 0 Filed at: 03/04/2025 1200   Calf swelling >3 cm compared to the other leg 0 Filed at: 03/04/2025 1200   Entire leg swollen 0 Filed at: 03/04/2025 1200   Collateral (nonvaricose) superficial veins present 0 Filed at: 03/04/2025 1200   Localized tenderness along the deep venous system 1 Filed at: 03/04/2025 1200   Pitting edema, confined to symptomatic leg 1 Filed at: 03/04/2025 1200   Paralysis, paresis, or recent plaster immobilization of the lower extremity 0 Filed at: 03/04/2025 1200   Previously documented DVT 0 Filed at: 03/04/2025 1200   Alternative diagnosis to DVT as likely or more likely 0 Filed at: 03/04/2025 1200   Andres DVT Critera Score 2 Filed at: 03/04/2025 1200                      History of Present Illness       Chief Complaint   Patient presents with    Leg Swelling     L lower leg swelling and L foot swelling. Has been occurring for weeks. +painful, no redness. +knee pain, no calf pain.        Past Medical History:   Diagnosis Date    Acute non-recurrent maxillary sinusitis 01/27/2020    Allergic     Seasonal    Anxiety     Bronchitis     Disease of thyroid gland 2023    Encounter for consultation 08/18/2020    Exposure to COVID-19 virus 11/24/2020    GERD (gastroesophageal reflux disease)     Kidney stone     Ovarian cyst     Pancreatitis     Psychiatric disorder     Upper respiratory tract infection 10/18/2019      Past Surgical History:   Procedure Laterality Date    APPENDECTOMY      CHOLECYSTECTOMY  2/26/24    GALLBLADDER SURGERY  02/2024    removal    DE LAPAROSCOPY SURG CHOLECYSTECTOMY N/A 02/26/2024    Procedure: CHOLECYSTECTOMY LAPAROSCOPIC;  Surgeon: Angel Mera DO;  [facility]: [facility] MAIN OR;  Service: General    TUBAL LIGATION        Family History   Problem Relation Age of Onset    Depression Family     Diabetes Family     Hypertension  Family     Schizophrenia Family     No Known Problems Mother     Alcohol abuse Father     Substance Abuse Father     Mental illness Father     Depression Father     Diabetes Father     Schizoaffective Disorder  Father     No Known Problems Sister     No Known Problems Daughter     No Known Problems Maternal Grandmother     No Known Problems Maternal Grandfather     No Known Problems Paternal Grandmother     No Known Problems Paternal Grandfather     No Known Problems Son     No Known Problems Son     No Known Problems Son     No Known Problems Paternal Aunt       Social History     Tobacco Use    Smoking status: Every Day     Current packs/day: 0.50     Average packs/day: 0.7 packs/day for 33.0 years (22.0 ttl pk-yrs)     Types: Cigarettes     Passive exposure: Current    Smokeless tobacco: Never   Vaping Use    Vaping status: Never Used   Substance Use Topics    Alcohol use: Yes     Comment: occassional    Drug use: No      E-Cigarette/Vaping    E-Cigarette Use Never User       E-Cigarette/Vaping Substances    Nicotine No     THC No     CBD No     Flavoring No       I have reviewed and agree with the history as documented.     44-year-old female with no relevant PMH presenting for left lower leg swelling and pain for the past 2 weeks.  Patient states that her symptoms initially started 2 weeks ago, with progressively worsening left lower leg swelling along with pain radiating from her knee, down her shin, and into her foot.  Then about a week ago, the pain progressed into her left groin area radiating down into her foot.  Also endorsing calf tenderness and a numbing/tingling sensation in the bottom of her left foot.  Denies any recent long distance travel, prolonged periods of stasis, OCP or estrogen use, or any previous history of DVTs or PEs.  Still able to ambulate, but limited by comfort.  Denies any fevers, chills, chest pain, SOB, nausea, vomiting, abdominal pain, back pain, or any bowel or bladder  incontinence.          Review of Systems   Constitutional:  Negative for chills and fever.   Respiratory:  Negative for shortness of breath.    Cardiovascular:  Negative for chest pain.   Gastrointestinal:  Negative for abdominal pain, diarrhea, nausea and vomiting.   Genitourinary:  Negative for dysuria and hematuria.   Musculoskeletal:  Positive for arthralgias and myalgias. Negative for back pain.        Left lower leg knee, calf, and foot pain and swelling.   Skin:  Negative for rash and wound.   Neurological:  Negative for light-headedness and headaches.   Psychiatric/Behavioral:  Negative for confusion.    All other systems reviewed and are negative.          Objective       ED Triage Vitals [03/04/25 1114]   Temperature Pulse Blood Pressure Respirations SpO2 Patient Position - Orthostatic VS   98.3 °F (36.8 °C) 102 137/99 19 100 % Sitting      Temp Source Heart Rate Source BP Location FiO2 (%) Pain Score    Oral Monitor Right arm -- --      Vitals      Date and Time Temp Pulse SpO2 Resp BP Pain Score FACES Pain Rating User   03/04/25 1114 98.3 °F (36.8 °C) 102 100 % 19 137/99 -- -- EM            Physical Exam  Vitals and nursing note reviewed.   Constitutional:       General: She is not in acute distress.     Appearance: Normal appearance. She is normal weight. She is not ill-appearing, toxic-appearing or diaphoretic.   HENT:      Head: Normocephalic and atraumatic.      Right Ear: External ear normal.      Left Ear: External ear normal.      Nose: Nose normal.      Mouth/Throat:      Mouth: Mucous membranes are moist.   Eyes:      General: No scleral icterus.        Right eye: No discharge.         Left eye: No discharge.      Extraocular Movements: Extraocular movements intact.      Conjunctiva/sclera: Conjunctivae normal.   Cardiovascular:      Rate and Rhythm: Normal rate and regular rhythm.      Heart sounds: Normal heart sounds. No murmur heard.  Pulmonary:      Effort: Pulmonary effort is normal. No  respiratory distress.      Breath sounds: Normal breath sounds. No wheezing, rhonchi or rales.   Abdominal:      General: Abdomen is flat.      Palpations: Abdomen is soft.      Tenderness: There is no abdominal tenderness. There is no guarding or rebound.   Musculoskeletal:         General: Swelling and tenderness present. Normal range of motion.      Cervical back: Normal range of motion.      Right lower leg: No edema.      Left lower leg: Edema present.      Comments: Mild left calf swelling, 1+ pitting edema in left foot.  No left popliteal fossa tenderness.  Left calf tender to palpation.  Left dorsalis pedis and posterior tibialis pulse auscultated on hand-held Doppler, equal with right sided pulses.  Intact and equal sensation to bilateral lower extremities.   Skin:     General: Skin is warm and dry.      Capillary Refill: Capillary refill takes less than 2 seconds.   Neurological:      General: No focal deficit present.      Mental Status: She is alert and oriented to person, place, and time. Mental status is at baseline.   Psychiatric:         Mood and Affect: Mood normal.         Behavior: Behavior normal.         Results Reviewed       None            VAS VENOUS DUPLEX -LOWER LIMB UNILATERAL   ED Interpretation by Kay Cantrell MD (03/04 2004)   CONCLUSION:     Impression:  RIGHT LOWER LIMB LIMITED:  Evaluation shows no evidence of thrombus in the common femoral vein.  Doppler evaluation shows a normal response to augmentation maneuvers.     LEFT LOWER LIMB:  No evidence of acute or chronic deep vein thrombosis  No evidence of superficial thrombophlebitis noted.  Doppler evaluation shows a normal response to augmentation maneuvers.  Popliteal, posterior tibial and anterior tibial arterial Doppler waveforms are  triphasic.     Technical findings were given to Dr. Dickson Conde in the ED at the time of the  exam.     SIGNATURE:  Electronically Signed by: YIFAN WIN MD on 2025-03-04 02:33:03 PM         Final Interpretation by Capo Mckeon MD (03/04 3263)          Procedures    ED Medication and Procedure Management   Prior to Admission Medications   Prescriptions Last Dose Informant Patient Reported? Taking?   buPROPion (WELLBUTRIN XL) 150 mg 24 hr tablet   No No   Sig: Take 1 tablet (150 mg total) by mouth every morning   cholecalciferol (VITAMIN D3) 250 MCG (65085 UT) capsule   No No   Sig: Take 5 capsules (50,000 Units total) by mouth once a week for 8 doses   cyanocobalamin ([employer] Vitamin B-12) 1000 MCG tablet   No No   Sig: TAKE 1 TABLET BY MOUTH EVERY DAY   ibuprofen (MOTRIN) 600 mg tablet   No No   Sig: Take 1 tablet (600 mg total) by mouth every 6 (six) hours as needed for moderate pain   nystatin (MYCOSTATIN) cream  Self Yes No   Sig: Apply 1 Application topically 2 (two) times a day To affected area   triamcinolone (KENALOG) 0.1 % ointment  Self Yes No   Sig: APPLY TWICE A DAY AS NEEDED FOR UP TO 2 WEEKS   valACYclovir (VALTREX) 500 mg tablet   No No   Sig: TAKE 1 TABLET BY MOUTH TWICE A DAY FOR 2 WEEKS      Facility-Administered Medications: None     Discharge Medication List as of 3/4/2025 12:37 PM        CONTINUE these medications which have NOT CHANGED    Details   buPROPion (WELLBUTRIN XL) 150 mg 24 hr tablet Take 1 tablet (150 mg total) by mouth every morning, Starting Mon 7/22/2024, Until Sat 1/18/2025, Normal      cholecalciferol (VITAMIN D3) 250 MCG (07537 UT) capsule Take 5 capsules (50,000 Units total) by mouth once a week for 8 doses, Starting Tue 9/10/2024, Until Wed 10/30/2024, Normal      cyanocobalamin ([employer] Vitamin B-12) 1000 MCG tablet TAKE 1 TABLET BY MOUTH EVERY DAY, Starting Mon 12/30/2024, Normal      ibuprofen (MOTRIN) 600 mg tablet Take 1 tablet (600 mg total) by mouth every 6 (six) hours as needed for moderate pain, Starting Thu 12/26/2024, Normal      nystatin (MYCOSTATIN) cream Apply 1 Application topically 2 (two) times a day To affected area, Starting Sun 11/26/2023,  Historical Med      triamcinolone (KENALOG) 0.1 % ointment APPLY TWICE A DAY AS NEEDED FOR UP TO 2 WEEKS, Historical Med      valACYclovir (VALTREX) 500 mg tablet TAKE 1 TABLET BY MOUTH TWICE A DAY FOR 2 WEEKS, Normal             ED SEPSIS DOCUMENTATION   Time reflects when diagnosis was documented in both MDM as applicable and the Disposition within this note       Time User Action Codes Description Comment    3/4/2025 12:22 PM Dickson Conde Add [R60.0] Leg edema, left                  Kay Cantrell MD  03/04/25 2006       Kay Cantrell MD  03/04/25 2014

## 2025-03-12 ENCOUNTER — NURSE TRIAGE (OUTPATIENT)
Age: 45
End: 2025-03-12

## 2025-03-12 ENCOUNTER — HOSPITAL ENCOUNTER (EMERGENCY)
Facility: HOSPITAL | Age: 45
Discharge: HOME | End: 2025-03-12
Attending: EMERGENCY MEDICINE
Payer: COMMERCIAL

## 2025-03-12 VITALS
OXYGEN SATURATION: 99 % | DIASTOLIC BLOOD PRESSURE: 67 MMHG | TEMPERATURE: 98.2 F | SYSTOLIC BLOOD PRESSURE: 125 MMHG | RESPIRATION RATE: 18 BRPM | HEART RATE: 72 BPM

## 2025-03-12 DIAGNOSIS — L03.116 CELLULITIS OF LEFT FOOT: Primary | ICD-10-CM

## 2025-03-12 PROCEDURE — 99284 EMERGENCY DEPT VISIT MOD MDM: CPT | Performed by: EMERGENCY MEDICINE

## 2025-03-12 PROCEDURE — 99283 EMERGENCY DEPT VISIT LOW MDM: CPT

## 2025-03-12 RX ORDER — CEPHALEXIN 500 MG/1
500 CAPSULE ORAL EVERY 6 HOURS SCHEDULED
Qty: 28 CAPSULE | Refills: 0 | Status: SHIPPED | OUTPATIENT
Start: 2025-03-12 | End: 2025-03-17

## 2025-03-12 RX ADMIN — CEPHALEXIN 500 MG: 250 CAPSULE ORAL at 21:02

## 2025-03-12 NOTE — TELEPHONE ENCOUNTER
"Pt called in stating that she has been having swelling in her left leg for a few weeks now but on 3/4 her swelling increased. She went to ED on 3/4 and had a doppler done and states then she was referred to vascular. Pt was scheduled for 3/27 however she states she would like to be seen sooner. She cannot put shoes on and she has been missing work.     Scheduled pt for an appointment on 3/14. Advised it symptoms become worse she needs to go back to the ED. Pt verbalized understanding.   Answer Assessment - Initial Assessment Questions  1. ONSET: \"When did the swelling start?\" (e.g., minutes, hours, days)      Couple weeks prior- 3/4 getting worse   2. LOCATION: \"What part of the leg is swollen?\"  \"Are both legs swollen or just one leg?\"      Left foot, ankle, up to calf   3. SEVERITY: \"How bad is the swelling?\" (e.g., localized; mild, moderate, severe)      Moderate   4. REDNESS: \"Does the swelling look red or infected?\"      Redness top of foot   5. PAIN: \"Is the swelling painful to touch?\" If Yes, ask: \"How painful is it?\"   (Scale 1-10; mild, moderate or severe)      7 pain level  6. FEVER: \"Do you have a fever?\" If Yes, ask: \"What is it, how was it measured, and when did it start?\"       Denies   7. CAUSE: \"What do you think is causing the leg swelling?\"      Unsure   8. MEDICAL HISTORY: \"Do you have a history of blood clots (e.g., DVT), cancer, heart failure, kidney disease, or liver failure?\"      Denies   9. RECURRENT SYMPTOM: \"Have you had leg swelling before?\" If Yes, ask: \"When was the last time?\" \"What happened that time?\"      Occasionally in the summer   10. OTHER SYMPTOMS: \"Do you have any other symptoms?\" (e.g., chest pain, difficulty breathing)        Denies    Protocols used: Leg Swelling and Edema-Adult-[place]    "

## 2025-03-13 NOTE — ED PROVIDER NOTES
Time reflects when diagnosis was documented in both MDM as applicable and the Disposition within this note       Time User Action Codes Description Comment    3/12/2025  8:33 PM Golden Dillonkristenlyric Add [L03.116] Cellulitis of left foot           ED Disposition       ED Disposition   Discharge    Condition   Stable    Date/Time   Wed Mar 12, 2025  8:33 PM    Comment   Michelle Costa discharge to home/self care.                   Assessment & Plan       Medical Decision Making  Patient is a 44-year-old female presents to the emergency department with left foot pain/edema that is been ongoing for the past 3 weeks and worsened recently.  Patient was evaluated in the ED on 3/4 for similar symptoms.  Workup at that time was negative for Baker's cyst/DVT including negative duplex ultrasound.  Patient was discharged home with outpatient vascular follow-up.  She has an appointment scheduled for 3/27 however her left lower extremity started to become more swollen, red, and painful prompting her to return to the emergency department.  Denies any systemic symptoms such as fevers, chills, nausea, vomiting, abdominal pain/cramping, diarrhea.  No hematuria/dysuria.  No fatigue.  Denies any recent trauma to her left lower extremity.  Denies any recent insect bite that she is aware of.     Clinically, left lower extremity is tender, edematous, and erythematous suggestive of cellulitis.  Patient has upcoming outpatient vascular follow-up which she was encouraged to keep and will be discharged home on p.o. Keflex 500 mg every 6 hours x 7 days.  First dose given here in the ED.    Patient is aware of and agreeable to plan. All questions answered. Patient discharged in stable condition with strict return precautions and instructions to follow up with their PCP.    Risk  Prescription drug management.             Medications   cephalexin (KEFLEX) capsule 500 mg (500 mg Oral Given 3/12/25 2102)       ED Risk Strat Scores                                                 History of Present Illness       Chief Complaint   Patient presents with    Foot Swelling     Left foot swelling for x2 weeks. States it is getting worse. Has yet to see vascular surgery       Past Medical History:   Diagnosis Date    Acute non-recurrent maxillary sinusitis 01/27/2020    Allergic     Seasonal    Anxiety     Bronchitis     Disease of thyroid gland 2023    Encounter for consultation 08/18/2020    Exposure to COVID-19 virus 11/24/2020    GERD (gastroesophageal reflux disease)     Kidney stone     Ovarian cyst     Pancreatitis     Psychiatric disorder     Upper respiratory tract infection 10/18/2019      Past Surgical History:   Procedure Laterality Date    APPENDECTOMY      CHOLECYSTECTOMY  2/26/24    GALLBLADDER SURGERY  02/2024    removal    VT LAPAROSCOPY SURG CHOLECYSTECTOMY N/A 02/26/2024    Procedure: CHOLECYSTECTOMY LAPAROSCOPIC;  Surgeon: Angel Mera DO;  Location: [facility] MAIN OR;  Service: General    TUBAL LIGATION        Family History   Problem Relation Age of Onset    Depression Family     Diabetes Family     Hypertension Family     Schizophrenia Family     No Known Problems Mother     Alcohol abuse Father     Substance Abuse Father     Mental illness Father     Depression Father     Diabetes Father     Schizoaffective Disorder  Father     No Known Problems Sister     No Known Problems Daughter     No Known Problems Maternal Grandmother     No Known Problems Maternal Grandfather     No Known Problems Paternal Grandmother     No Known Problems Paternal Grandfather     No Known Problems Son     No Known Problems Son     No Known Problems Son     No Known Problems Paternal Aunt       Social History     Tobacco Use    Smoking status: Every Day     Current packs/day: 0.50     Average packs/day: 0.7 packs/day for 33.0 years (22.0 ttl pk-yrs)     Types: Cigarettes     Passive exposure: Current    Smokeless tobacco: Never   Vaping Use    Vaping  status: Never Used   Substance Use Topics    Alcohol use: Yes     Comment: occassional    Drug use: No      E-Cigarette/Vaping    E-Cigarette Use Never User       E-Cigarette/Vaping Substances    Nicotine No     THC No     CBD No     Flavoring No       I have reviewed and agree with the history as documented.     Patient is a 44-year-old female presents to the emergency department with left foot pain/edema that is been ongoing for the past 3 weeks and worsened recently.  Patient was evaluated in the ED on 3/4 for similar symptoms.  Workup at that time was negative for Baker's cyst/DVT including negative duplex ultrasound.  Patient was discharged home with outpatient vascular follow-up.  She has an appointment scheduled for 3/27 however her left lower extremity started to become more swollen, red, and painful prompting her to return to the emergency department.  Denies any systemic symptoms such as fevers, chills, nausea, vomiting, abdominal pain/cramping, diarrhea.  No hematuria/dysuria.  No fatigue.  Denies any recent trauma to her left lower extremity.  Denies any recent insect bite that she is aware of.           Review of Systems   Skin:  Positive for color change and rash.   All other systems reviewed and are negative.          Objective       ED Triage Vitals [03/12/25 1912]   Temperature Pulse Blood Pressure Respirations SpO2 Patient Position - Orthostatic VS   98.2 °F (36.8 °C) 72 155/97 18 99 % Sitting      Temp Source Heart Rate Source BP Location FiO2 (%) Pain Score    Oral Monitor Right arm -- --      Vitals      Date and Time Temp Pulse SpO2 Resp BP Pain Score FACES Pain Rating User   03/12/25 2108 -- -- -- -- 125/67 -- -- TK   03/12/25 1912 98.2 °F (36.8 °C) 72 99 % 18 155/97 -- -- KB            Physical Exam  Vitals and nursing note reviewed.   Constitutional:       General: She is not in acute distress.     Appearance: Normal appearance.   HENT:      Head: Normocephalic and atraumatic.   Eyes:       Extraocular Movements: Extraocular movements intact.      Conjunctiva/sclera: Conjunctivae normal.   Cardiovascular:      Rate and Rhythm: Normal rate and regular rhythm.      Pulses: Normal pulses.      Heart sounds: Normal heart sounds. No murmur heard.  Pulmonary:      Effort: Pulmonary effort is normal. No respiratory distress.      Breath sounds: Normal breath sounds.   Abdominal:      General: There is no distension.      Palpations: Abdomen is soft.      Tenderness: There is no abdominal tenderness. There is no guarding or rebound.   Musculoskeletal:         General: Swelling and tenderness present. Normal range of motion.      Cervical back: Normal range of motion.      Right lower leg: No edema.      Left lower leg: Edema present.      Comments: Left lower extremity erythematous, warm, and tender up to the mid calf.  No obvious evidence of trauma or wound appreciated.  No calf tenderness.   Skin:     General: Skin is warm and dry.      Capillary Refill: Capillary refill takes less than 2 seconds.      Findings: No rash.   Neurological:      General: No focal deficit present.      Mental Status: She is alert and oriented to person, place, and time.      Sensory: No sensory deficit.   Psychiatric:         Mood and Affect: Mood normal.         Behavior: Behavior normal.         Results Reviewed       None            No orders to display       Procedures    ED Medication and Procedure Management   Prior to Admission Medications   Prescriptions Last Dose Informant Patient Reported? Taking?   buPROPion (WELLBUTRIN XL) 150 mg 24 hr tablet   No No   Sig: Take 1 tablet (150 mg total) by mouth every morning   cholecalciferol (VITAMIN D3) 250 MCG (63976 UT) capsule   No No   Sig: Take 5 capsules (50,000 Units total) by mouth once a week for 8 doses   cyanocobalamin ([employer] Vitamin B-12) 1000 MCG tablet   No No   Sig: TAKE 1 TABLET BY MOUTH EVERY DAY   ibuprofen (MOTRIN) 600 mg tablet   No No   Sig: Take 1 tablet (600 mg  total) by mouth every 6 (six) hours as needed for moderate pain   nystatin (MYCOSTATIN) cream  Self Yes No   Sig: Apply 1 Application topically 2 (two) times a day To affected area   triamcinolone (KENALOG) 0.1 % ointment  Self Yes No   Sig: APPLY TWICE A DAY AS NEEDED FOR UP TO 2 WEEKS   valACYclovir (VALTREX) 500 mg tablet   No No   Sig: TAKE 1 TABLET BY MOUTH TWICE A DAY FOR 2 WEEKS      Facility-Administered Medications: None     Discharge Medication List as of 3/12/2025  8:55 PM        START taking these medications    Details   cephalexin (KEFLEX) 500 mg capsule Take 1 capsule (500 mg total) by mouth every 6 (six) hours for 7 days, Starting Wed 3/12/2025, Until Wed 3/19/2025, Normal           CONTINUE these medications which have NOT CHANGED    Details   buPROPion (WELLBUTRIN XL) 150 mg 24 hr tablet Take 1 tablet (150 mg total) by mouth every morning, Starting Mon 7/22/2024, Until Sat 1/18/2025, Normal      cholecalciferol (VITAMIN D3) 250 MCG (06033 UT) capsule Take 5 capsules (50,000 Units total) by mouth once a week for 8 doses, Starting Tue 9/10/2024, Until Wed 10/30/2024, Normal      cyanocobalamin ([employer] Vitamin B-12) 1000 MCG tablet TAKE 1 TABLET BY MOUTH EVERY DAY, Starting Mon 12/30/2024, Normal      ibuprofen (MOTRIN) 600 mg tablet Take 1 tablet (600 mg total) by mouth every 6 (six) hours as needed for moderate pain, Starting Thu 12/26/2024, Normal      nystatin (MYCOSTATIN) cream Apply 1 Application topically 2 (two) times a day To affected area, Starting Sun 11/26/2023, Historical Med      triamcinolone (KENALOG) 0.1 % ointment APPLY TWICE A DAY AS NEEDED FOR UP TO 2 WEEKS, Historical Med      valACYclovir (VALTREX) 500 mg tablet TAKE 1 TABLET BY MOUTH TWICE A DAY FOR 2 WEEKS, Normal           No discharge procedures on file.  ED SEPSIS DOCUMENTATION   Time reflects when diagnosis was documented in both MDM as applicable and the Disposition within this note       Time User Action Codes Description  Comment    3/12/2025  8:33 PM Olena Dillon Add [L03.116] Cellulitis of left foot                  Olena Dillon MD  03/12/25 5271

## 2025-03-13 NOTE — DISCHARGE INSTRUCTIONS
Please be sure to keep your follow-up appointments    Please take your antibiotics as directed.  Keflex 500mg every 6 hours for 7 days.    Please return to the Emergency Department for any worsening symptoms or new concerns

## 2025-03-14 ENCOUNTER — OFFICE VISIT (OUTPATIENT)
Dept: VASCULAR SURGERY | Facility: CLINIC | Age: 45
End: 2025-03-14
Payer: COMMERCIAL

## 2025-03-14 ENCOUNTER — TELEPHONE (OUTPATIENT)
Dept: INTERNAL MEDICINE CLINIC | Facility: CLINIC | Age: 45
End: 2025-03-14

## 2025-03-14 VITALS
SYSTOLIC BLOOD PRESSURE: 122 MMHG | DIASTOLIC BLOOD PRESSURE: 74 MMHG | BODY MASS INDEX: 35.79 KG/M2 | HEIGHT: 67 IN | RESPIRATION RATE: 18 BRPM | OXYGEN SATURATION: 99 % | HEART RATE: 71 BPM | WEIGHT: 228 LBS

## 2025-03-14 DIAGNOSIS — L03.119 CELLULITIS OF FOOT: Primary | ICD-10-CM

## 2025-03-14 PROCEDURE — 99204 OFFICE O/P NEW MOD 45 MIN: CPT | Performed by: PHYSICIAN ASSISTANT

## 2025-03-14 NOTE — ASSESSMENT & PLAN NOTE
L foot cellulitis  -Seen in ED 3/4 and 3/12 and placed on Keflex 500 q 6 hours x 7 days. No BW.   -L foot swelling began about 3 weeks ago and developed into cellulitis  -Acute L foot and lower leg pain, redness with difficulty walking; provided ABX and shoe in the ED  -No red streak on leg  -No Hx of historic venous insufficiency  -No fevers, chills    Exam:     -L foot with moderate 2+ edema with slight warmth and tenderness. Mild LE edema 1/2 up the calf. Small water blister over the distal anterior lower leg  -Multiphasic AT/DP/PT signals    -L LE venous du 3/4/25: No DVT or SVT. Triphasic waveforms at the ankle.     Plan:  -L foot cellulitis and seen in the ED 3/12/25, placed on ATB  -Continue with course of antibiotics  -Elevate leg at rest and may use gentle bandage or shoe  -May continue with acetaminophen (Tylenol) as needed for pain (avoid NSAIDS)  -Follow-up with primary care in the next 1 week  -Go to ED for fevers, chills or worsening of the condition  -Return to vascular surgery in about 4 weeks for resolution

## 2025-03-14 NOTE — PATIENT INSTRUCTIONS
L foot cellulitis  -Seen in ED 3/4 and 3/12 and placed on Keflex 500 q 6 hours x 7 days    -Moderate foot well and tenderness. Mild LE edema 1/2 up the calf.    Plan:  -Continue with antibiotics  -Try to take it easy of the foot   -Elevate leg at rest  -May use gentle bandage or surgical shoe  -May continue with acetaminophen (Tylenol) as needed for pain  -Avoid NSAIDS  -Follow-up with primary care  -Go to ED for fevers, chills or worsening of the condition  -Return to vascular surgery in about 4 weeks for resolution and to decide
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
## 2025-03-17 ENCOUNTER — OFFICE VISIT (OUTPATIENT)
Dept: INTERNAL MEDICINE CLINIC | Facility: CLINIC | Age: 45
End: 2025-03-17
Payer: COMMERCIAL

## 2025-03-17 ENCOUNTER — APPOINTMENT (OUTPATIENT)
Dept: LAB | Facility: CLINIC | Age: 45
End: 2025-03-17
Payer: COMMERCIAL

## 2025-03-17 ENCOUNTER — HOSPITAL ENCOUNTER (OUTPATIENT)
Dept: CT IMAGING | Facility: HOSPITAL | Age: 45
Discharge: HOME | End: 2025-03-17
Payer: COMMERCIAL

## 2025-03-17 ENCOUNTER — RESULTS FOLLOW-UP (OUTPATIENT)
Dept: INTERNAL MEDICINE CLINIC | Facility: CLINIC | Age: 45
End: 2025-03-17

## 2025-03-17 VITALS
SYSTOLIC BLOOD PRESSURE: 142 MMHG | TEMPERATURE: 97 F | BODY MASS INDEX: 35.94 KG/M2 | WEIGHT: 229 LBS | HEART RATE: 65 BPM | OXYGEN SATURATION: 100 % | HEIGHT: 67 IN | DIASTOLIC BLOOD PRESSURE: 88 MMHG

## 2025-03-17 DIAGNOSIS — E21.3 HYPERPARATHYROIDISM (HCC): ICD-10-CM

## 2025-03-17 DIAGNOSIS — E83.52 HYPERCALCEMIA: ICD-10-CM

## 2025-03-17 DIAGNOSIS — L03.116 CELLULITIS OF LEFT LEG: Primary | ICD-10-CM

## 2025-03-17 DIAGNOSIS — E53.8 B12 DEFICIENCY: ICD-10-CM

## 2025-03-17 DIAGNOSIS — E55.9 VITAMIN D DEFICIENCY: ICD-10-CM

## 2025-03-17 DIAGNOSIS — E53.8 FOLATE DEFICIENCY: ICD-10-CM

## 2025-03-17 DIAGNOSIS — L03.116 CELLULITIS OF LEFT LEG: ICD-10-CM

## 2025-03-17 LAB
25(OH)D3 SERPL-MCNC: 11 NG/ML (ref 30–100)
ALBUMIN SERPL BCG-MCNC: 4.1 G/DL (ref 3.5–5)
ALP SERPL-CCNC: 128 U/L (ref 34–104)
ALT SERPL W P-5'-P-CCNC: 12 U/L (ref 7–52)
ANION GAP SERPL CALCULATED.3IONS-SCNC: 4 MMOL/L (ref 4–13)
ANISOCYTOSIS BLD QL SMEAR: PRESENT
AST SERPL W P-5'-P-CCNC: 10 U/L (ref 13–39)
BASOPHILS # BLD MANUAL: 0.04 THOUSAND/UL (ref 0–0.1)
BASOPHILS NFR MAR MANUAL: 1 % (ref 0–1)
BILIRUB SERPL-MCNC: 0.34 MG/DL (ref 0.2–1)
BUN SERPL-MCNC: 10 MG/DL (ref 5–25)
CA-I BLD-SCNC: 1.46 MMOL/L (ref 1.12–1.32)
CALCIUM SERPL-MCNC: 10.9 MG/DL (ref 8.4–10.2)
CHLORIDE SERPL-SCNC: 110 MMOL/L (ref 96–108)
CO2 SERPL-SCNC: 27 MMOL/L (ref 21–32)
CREAT SERPL-MCNC: 0.66 MG/DL (ref 0.6–1.3)
EOSINOPHIL # BLD MANUAL: 0.08 THOUSAND/UL (ref 0–0.4)
EOSINOPHIL NFR BLD MANUAL: 2 % (ref 0–6)
ERYTHROCYTE [DISTWIDTH] IN BLOOD BY AUTOMATED COUNT: 15.5 % (ref 11.6–15.1)
FOLATE SERPL-MCNC: 5.4 NG/ML
GFR SERPL CREATININE-BSD FRML MDRD: 106 ML/MIN/1.73SQ M
GIANT PLATELETS BLD QL SMEAR: PRESENT
GLUCOSE SERPL-MCNC: 97 MG/DL (ref 65–140)
HCT VFR BLD AUTO: 37.4 % (ref 34.8–46.1)
HGB BLD-MCNC: 12.4 G/DL (ref 11.5–15.4)
LYMPHOCYTES # BLD AUTO: 1.43 THOUSAND/UL (ref 0.6–4.47)
LYMPHOCYTES # BLD AUTO: 32 % (ref 14–44)
MCH RBC QN AUTO: 27 PG (ref 26.8–34.3)
MCHC RBC AUTO-ENTMCNC: 33.2 G/DL (ref 31.4–37.4)
MCV RBC AUTO: 81 FL (ref 82–98)
MONOCYTES # BLD AUTO: 0.38 THOUSAND/UL (ref 0–1.22)
MONOCYTES NFR BLD: 9 % (ref 4–12)
NEUTROPHILS # BLD MANUAL: 2.27 THOUSAND/UL (ref 1.85–7.62)
NEUTS SEG NFR BLD AUTO: 54 % (ref 43–75)
PLATELET # BLD AUTO: 199 THOUSANDS/UL (ref 149–390)
PLATELET BLD QL SMEAR: ADEQUATE
PMV BLD AUTO: 12.2 FL (ref 8.9–12.7)
POTASSIUM SERPL-SCNC: 3.8 MMOL/L (ref 3.5–5.3)
PROT SERPL-MCNC: 6.7 G/DL (ref 6.4–8.4)
PTH-INTACT SERPL-MCNC: 250.5 PG/ML (ref 12–88)
RBC # BLD AUTO: 4.6 MILLION/UL (ref 3.81–5.12)
RBC MORPH BLD: PRESENT
SODIUM SERPL-SCNC: 141 MMOL/L (ref 135–147)
URATE SERPL-MCNC: 4.9 MG/DL (ref 2–7.5)
VARIANT LYMPHS # BLD AUTO: 2 %
VIT B12 SERPL-MCNC: 128 PG/ML (ref 180–914)
WBC # BLD AUTO: 4.2 THOUSAND/UL (ref 4.31–10.16)

## 2025-03-17 PROCEDURE — 82330 ASSAY OF CALCIUM: CPT

## 2025-03-17 PROCEDURE — 84550 ASSAY OF BLOOD/URIC ACID: CPT | Performed by: INTERNAL MEDICINE

## 2025-03-17 PROCEDURE — 80053 COMPREHEN METABOLIC PANEL: CPT | Performed by: INTERNAL MEDICINE

## 2025-03-17 PROCEDURE — 82607 VITAMIN B-12: CPT

## 2025-03-17 PROCEDURE — 84165 PROTEIN E-PHORESIS SERUM: CPT

## 2025-03-17 PROCEDURE — 73700 CT LOWER EXTREMITY W/O DYE: CPT

## 2025-03-17 PROCEDURE — 85007 BL SMEAR W/DIFF WBC COUNT: CPT | Performed by: INTERNAL MEDICINE

## 2025-03-17 PROCEDURE — 82306 VITAMIN D 25 HYDROXY: CPT

## 2025-03-17 PROCEDURE — 99214 OFFICE O/P EST MOD 30 MIN: CPT | Performed by: INTERNAL MEDICINE

## 2025-03-17 PROCEDURE — 83970 ASSAY OF PARATHORMONE: CPT

## 2025-03-17 PROCEDURE — 82746 ASSAY OF FOLIC ACID SERUM: CPT

## 2025-03-17 PROCEDURE — 36415 COLL VENOUS BLD VENIPUNCTURE: CPT

## 2025-03-17 PROCEDURE — 85027 COMPLETE CBC AUTOMATED: CPT | Performed by: INTERNAL MEDICINE

## 2025-03-17 NOTE — LETTER
March 17, 2025     Patient: Michelle Costa  YOB: 1980  Date of Visit: 3/17/2025      To Whom it May Concern:    Michelle Costa is under my professional care. Michelle was seen in my office on 3/17/2025.     If you have any questions or concerns, please don't hesitate to call.         Sincerely,          Lea Reyes, MD        CC: No Recipients

## 2025-03-17 NOTE — PROGRESS NOTES
Name: Michelle Costa      : 1980      MRN: [id number]  Encounter Provider: Lea Reyes, MD  Encounter Date: 3/17/2025   Encounter department: [facility]    Assessment & Plan  Cellulitis of left leg  Left foot swelling redness without improvement after day 5 of cephalexin  Will switch to Augmentin  Obtain blood work today including uric acid  Will arrange for a stat CT  Advised to go to the emergency room if the symptoms get worse while on the new antibiotic  Orders:    CBC and differential    Comprehensive metabolic panel    Uric acid    CT lower extremity wo contrast left; Future    amoxicillin-clavulanate (AUGMENTIN) 875-125 mg per tablet; Take 1 tablet by mouth every 12 (twelve) hours for 7 days    Manual Differential(PHLEBS Do Not Order)    RBC Morphology Reflex Test    Hyperparathyroidism (HCC)  Schedule parathyroid scan  Follow-up with endocrinology            History of Present Illness     3 weeks ago, started with left foot swelling.  She went to the ER a week after it started.  Lower extremity Doppler negative for DVT.  The swelling got worse with the redness that she developed a blister in the distal shin.  She was seen again in the emergency room and placed on cephalexin.  She is on day 5 without any improvement.  The blister ruptured yesterday.  She denies fever chills shortness of breath.  Denies any trauma to the foot or ankle.        Review of Systems   Constitutional:  Negative for chills and fever.   Respiratory:  Negative for shortness of breath.    Cardiovascular:  Positive for leg swelling. Negative for chest pain and palpitations.   Gastrointestinal:  Negative for abdominal pain, diarrhea, nausea and vomiting.     Past Medical History:   Diagnosis Date    Acute non-recurrent maxillary sinusitis 2020    Allergic     Seasonal    Anxiety     Bronchitis     Disease of thyroid gland     Encounter for consultation 2020    Exposure to COVID-19 virus 2020     GERD (gastroesophageal reflux disease)     Kidney stone     Ovarian cyst     Pancreatitis     Psychiatric disorder     Upper respiratory tract infection 10/18/2019     Past Surgical History:   Procedure Laterality Date    APPENDECTOMY      CHOLECYSTECTOMY  2/26/24    GALLBLADDER SURGERY  02/2024    removal    CO LAPAROSCOPY SURG CHOLECYSTECTOMY N/A 02/26/2024    Procedure: CHOLECYSTECTOMY LAPAROSCOPIC;  Surgeon: Angel Mera DO;  Location: [facility];  Service: General    TUBAL LIGATION       Family History   Problem Relation Age of Onset    Depression Family     Diabetes Family     Hypertension Family     Schizophrenia Family     No Known Problems Mother     Alcohol abuse Father     Substance Abuse Father     Mental illness Father     Depression Father     Diabetes Father     Schizoaffective Disorder  Father     No Known Problems Sister     No Known Problems Daughter     No Known Problems Maternal Grandmother     No Known Problems Maternal Grandfather     No Known Problems Paternal Grandmother     No Known Problems Paternal Grandfather     No Known Problems Son     No Known Problems Son     No Known Problems Son     No Known Problems Paternal Aunt      Social History     Tobacco Use    Smoking status: Every Day     Current packs/day: 0.50     Average packs/day: 0.7 packs/day for 33.0 years (22.0 ttl pk-yrs)     Types: Cigarettes     Passive exposure: Current    Smokeless tobacco: Never   Vaping Use    Vaping status: Never Used   Substance and Sexual Activity    Alcohol use: Yes     Comment: occassional    Drug use: No    Sexual activity: Not Currently     Partners: Male     Birth control/protection: Female Sterilization     Current Outpatient Medications on File Prior to Visit   Medication Sig    cyanocobalamin ([employer] Vitamin B-12) 1000 MCG tablet TAKE 1 TABLET BY MOUTH EVERY DAY    nystatin (MYCOSTATIN) cream Apply 1 Application topically 2 (two) times a day To affected area    triamcinolone  "(KENALOG) 0.1 % ointment APPLY TWICE A DAY AS NEEDED FOR UP TO 2 WEEKS    valACYclovir (VALTREX) 500 mg tablet TAKE 1 TABLET BY MOUTH TWICE A DAY FOR 2 WEEKS    [DISCONTINUED] cephalexin (KEFLEX) 500 mg capsule Take 1 capsule (500 mg total) by mouth every 6 (six) hours for 7 days    cholecalciferol (VITAMIN D3) 250 MCG (38395 UT) capsule Take 5 capsules (50,000 Units total) by mouth once a week for 8 doses    ibuprofen (MOTRIN) 600 mg tablet Take 1 tablet (600 mg total) by mouth every 6 (six) hours as needed for moderate pain (Patient not taking: Reported on 3/17/2025)    [DISCONTINUED] buPROPion (WELLBUTRIN XL) 150 mg 24 hr tablet Take 1 tablet (150 mg total) by mouth every morning     Allergies   Allergen Reactions    Mucinex Cough For Kids [Dextromethorphan-Guaifenesin] Other (See Comments)     Per verbal of pt \"It causes body aches\"    Other Allergic Rhinitis     Immunization History   Administered Date(s) Administered    COVID-19 PFIZER VACCINE 0.3 ML IM 04/12/2021, 05/08/2021    Tdap 06/03/2020    Tuberculin Skin Test-PPD Intradermal 03/18/2013, 06/03/2020     Objective   /88 (BP Location: Left arm, Patient Position: Sitting, Cuff Size: Large)   Pulse 65   Temp (!) 97 °F (36.1 °C) (Tympanic)   Ht 5' 7\" (1.702 m)   Wt 104 kg (229 lb)   LMP 02/16/2025 (Approximate)   SpO2 100%   BMI 35.87 kg/m²     Physical Exam  Constitutional:       Appearance: She is not ill-appearing.   Neck:      Thyroid: Thyromegaly present.   Cardiovascular:      Rate and Rhythm: Regular rhythm.      Heart sounds: Normal heart sounds.   Pulmonary:      Breath sounds: Normal breath sounds.   Abdominal:      Palpations: Abdomen is soft.      Tenderness: There is no abdominal tenderness.   Musculoskeletal:      Cervical back: Neck supple.      Right foot: Normal.      Left foot: Swelling (Erythema, warmth over the dorsum of the left foot up to the distal shin) and tenderness present.         "

## 2025-03-18 ENCOUNTER — RESULTS FOLLOW-UP (OUTPATIENT)
Dept: ENDOCRINOLOGY | Facility: CLINIC | Age: 45
End: 2025-03-18

## 2025-03-18 DIAGNOSIS — E53.8 FOLATE DEFICIENCY: ICD-10-CM

## 2025-03-18 DIAGNOSIS — E55.9 VITAMIN D DEFICIENCY: Primary | ICD-10-CM

## 2025-03-18 NOTE — RESULT ENCOUNTER NOTE
Hi  Your calcium is elevated.  Do not take any calcium supplementation.  Also keep yourself well-hydrated.  Follow-up for your appointment as scheduled

## 2025-03-19 LAB
ALBUMIN SERPL ELPH-MCNC: 3.77 G/DL (ref 3.2–5.1)
ALBUMIN SERPL ELPH-MCNC: 59.9 % (ref 48–70)
ALPHA1 GLOB SERPL ELPH-MCNC: 0.28 G/DL (ref 0.15–0.47)
ALPHA1 GLOB SERPL ELPH-MCNC: 4.5 % (ref 1.8–7)
ALPHA2 GLOB SERPL ELPH-MCNC: 0.64 G/DL (ref 0.42–1.04)
ALPHA2 GLOB SERPL ELPH-MCNC: 10.1 % (ref 5.9–14.9)
BETA GLOB ABNORMAL SERPL ELPH-MCNC: 0.4 G/DL (ref 0.31–0.57)
BETA1 GLOB SERPL ELPH-MCNC: 6.4 % (ref 4.7–7.7)
BETA2 GLOB SERPL ELPH-MCNC: 6.1 % (ref 3.1–7.9)
BETA2+GAMMA GLOB SERPL ELPH-MCNC: 0.38 G/DL (ref 0.2–0.58)
GAMMA GLOB ABNORMAL SERPL ELPH-MCNC: 0.82 G/DL (ref 0.4–1.66)
GAMMA GLOB SERPL ELPH-MCNC: 13 % (ref 6.9–22.3)
IGG/ALB SER: 1.49 {RATIO} (ref 1.1–1.8)
PROT PATTERN SERPL ELPH-IMP: ABNORMAL
PROT SERPL-MCNC: 6.3 G/DL (ref 6.4–8.2)

## 2025-03-19 PROCEDURE — 84165 PROTEIN E-PHORESIS SERUM: CPT | Performed by: PATHOLOGY

## 2025-03-19 NOTE — ED ATTENDING ATTESTATION
3/12/2025  I, Capo Schofield MD, saw and evaluated the patient. I have discussed the patient with the resident/non-physician practitioner and agree with the resident's/non-physician practitioner's findings, Plan of Care, and MDM as documented in the resident's/non-physician practitioner's note, except where noted. All available labs and Radiology studies were reviewed.  I was present for key portions of any procedure(s) performed by the resident/non-physician practitioner and I was immediately available to provide assistance.       At this point I agree with the current assessment done in the Emergency Department.  I have conducted an independent evaluation of this patient a history and physical is as follows:see h and p above - agree with er resident tx plan / dispo     ED Course         Critical Care Time  Procedures

## 2025-03-20 ENCOUNTER — HOSPITAL ENCOUNTER (INPATIENT)
Facility: HOSPITAL | Age: 45
LOS: 1 days | Discharge: HOME | DRG: 603 | End: 2025-03-22
Attending: EMERGENCY MEDICINE | Admitting: INTERNAL MEDICINE
Payer: COMMERCIAL

## 2025-03-20 ENCOUNTER — NURSE TRIAGE (OUTPATIENT)
Age: 45
End: 2025-03-20

## 2025-03-20 DIAGNOSIS — E21.3 HYPERPARATHYROIDISM (HCC): ICD-10-CM

## 2025-03-20 DIAGNOSIS — L03.116 CELLULITIS OF LEFT LOWER LEG: Primary | ICD-10-CM

## 2025-03-20 LAB
ALBUMIN SERPL BCG-MCNC: 4.2 G/DL (ref 3.5–5)
ALP SERPL-CCNC: 129 U/L (ref 34–104)
ALT SERPL W P-5'-P-CCNC: 17 U/L (ref 7–52)
ANION GAP SERPL CALCULATED.3IONS-SCNC: 2 MMOL/L (ref 4–13)
APTT PPP: 27 SECONDS (ref 23–34)
AST SERPL W P-5'-P-CCNC: 13 U/L (ref 13–39)
BILIRUB SERPL-MCNC: 0.36 MG/DL (ref 0.2–1)
BUN SERPL-MCNC: 9 MG/DL (ref 5–25)
CALCIUM SERPL-MCNC: 10.9 MG/DL (ref 8.4–10.2)
CHLORIDE SERPL-SCNC: 110 MMOL/L (ref 96–108)
CK SERPL-CCNC: 64 U/L (ref 26–192)
CO2 SERPL-SCNC: 27 MMOL/L (ref 21–32)
CREAT SERPL-MCNC: 0.68 MG/DL (ref 0.6–1.3)
GFR SERPL CREATININE-BSD FRML MDRD: 105 ML/MIN/1.73SQ M
GLUCOSE SERPL-MCNC: 89 MG/DL (ref 65–140)
HCG SERPL QL: NEGATIVE
INR PPP: 0.93 (ref 0.85–1.19)
LACTATE SERPL-SCNC: 0.9 MMOL/L (ref 0.5–2)
POTASSIUM SERPL-SCNC: 3.8 MMOL/L (ref 3.5–5.3)
PROCALCITONIN SERPL-MCNC: <0.05 NG/ML
PROT SERPL-MCNC: 7 G/DL (ref 6.4–8.4)
PROTHROMBIN TIME: 13.1 SECONDS (ref 12.3–15)
SODIUM SERPL-SCNC: 139 MMOL/L (ref 135–147)

## 2025-03-20 PROCEDURE — 85027 COMPLETE CBC AUTOMATED: CPT | Performed by: STUDENT IN AN ORGANIZED HEALTH CARE EDUCATION/TRAINING PROGRAM

## 2025-03-20 PROCEDURE — 99284 EMERGENCY DEPT VISIT MOD MDM: CPT

## 2025-03-20 PROCEDURE — 85007 BL SMEAR W/DIFF WBC COUNT: CPT | Performed by: STUDENT IN AN ORGANIZED HEALTH CARE EDUCATION/TRAINING PROGRAM

## 2025-03-20 PROCEDURE — 82550 ASSAY OF CK (CPK): CPT | Performed by: STUDENT IN AN ORGANIZED HEALTH CARE EDUCATION/TRAINING PROGRAM

## 2025-03-20 PROCEDURE — 84703 CHORIONIC GONADOTROPIN ASSAY: CPT | Performed by: STUDENT IN AN ORGANIZED HEALTH CARE EDUCATION/TRAINING PROGRAM

## 2025-03-20 PROCEDURE — 80053 COMPREHEN METABOLIC PANEL: CPT | Performed by: STUDENT IN AN ORGANIZED HEALTH CARE EDUCATION/TRAINING PROGRAM

## 2025-03-20 PROCEDURE — 83605 ASSAY OF LACTIC ACID: CPT | Performed by: STUDENT IN AN ORGANIZED HEALTH CARE EDUCATION/TRAINING PROGRAM

## 2025-03-20 PROCEDURE — 85730 THROMBOPLASTIN TIME PARTIAL: CPT | Performed by: STUDENT IN AN ORGANIZED HEALTH CARE EDUCATION/TRAINING PROGRAM

## 2025-03-20 PROCEDURE — 84145 PROCALCITONIN (PCT): CPT | Performed by: STUDENT IN AN ORGANIZED HEALTH CARE EDUCATION/TRAINING PROGRAM

## 2025-03-20 PROCEDURE — 96365 THER/PROPH/DIAG IV INF INIT: CPT

## 2025-03-20 PROCEDURE — 87040 BLOOD CULTURE FOR BACTERIA: CPT | Performed by: STUDENT IN AN ORGANIZED HEALTH CARE EDUCATION/TRAINING PROGRAM

## 2025-03-20 PROCEDURE — 96361 HYDRATE IV INFUSION ADD-ON: CPT

## 2025-03-20 PROCEDURE — 36415 COLL VENOUS BLD VENIPUNCTURE: CPT | Performed by: STUDENT IN AN ORGANIZED HEALTH CARE EDUCATION/TRAINING PROGRAM

## 2025-03-20 PROCEDURE — 85610 PROTHROMBIN TIME: CPT | Performed by: STUDENT IN AN ORGANIZED HEALTH CARE EDUCATION/TRAINING PROGRAM

## 2025-03-20 PROCEDURE — 99285 EMERGENCY DEPT VISIT HI MDM: CPT | Performed by: EMERGENCY MEDICINE

## 2025-03-20 RX ADMIN — PIPERACILLIN SODIUM AND TAZOBACTAM SODIUM 4.5 G: 36; 4.5 INJECTION, POWDER, LYOPHILIZED, FOR SOLUTION INTRAVENOUS at 23:24

## 2025-03-20 RX ADMIN — SODIUM CHLORIDE 1000 ML: 0.9 INJECTION, SOLUTION INTRAVENOUS at 23:21

## 2025-03-20 NOTE — LETTER
[facility] 2ND FLOOR MED SURG UNIT  [address]  [place] [address]  Dept: [phone]    March 22, 2025     Patient: Michelle Costa   YOB: 1980   Date of Visit: 3/20/2025       To Whom it May Concern:    Michelle Costa is under my professional care. She was seen in the hospital from 3/20/2025 to 03/22/25. She may return to work on 3/24/2025 without limitations.    If you have any questions or concerns, please don't hesitate to call.         Sincerely,          Lainey Salazar MD

## 2025-03-20 NOTE — Clinical Note
Case was discussed with RONN and the patient's admission status was agreed to be Admission Status: observation

## 2025-03-20 NOTE — TELEPHONE ENCOUNTER
Regarding: no improvement , Side effects of ABT  ----- Message from Dominique MCFADDEN sent at 3/20/2025 10:34 AM EDT -----  Sent via my chart    I apologize this message did not come out correctly. I started the penicillin on Tuesday. There's no change in the swelling of my foot or leg. I have been feeling nauseous for the last two days and diarrhea. Is this normal with the medication?

## 2025-03-20 NOTE — TELEPHONE ENCOUNTER
If her left foot has not improved on the second antibiotic and she is having side effects from it, I think she needs to be admitted for treatment of the infection.  We discussed that she may need to do this when I saw her if she still does not improve on the second antibiotic.

## 2025-03-20 NOTE — TELEPHONE ENCOUNTER
"FOLLOW UP: Please advise , Cellulitis left foot, no improvement , not worse.     REASON FOR CONVERSATION: Medication Problem    SYMPTOMS: left foot swelling and redness. Side effects from ABT     OTHER: also c/o Diarrhea from ABT, not feeling well     DISPOSITION: Callback by PCP Today  Reason for Disposition   Patient wants to stop the antibiotic    Answer Assessment - Initial Assessment Questions  1. ANTIBIOTIC: \"What antibiotic are you taking?\" \"How many times per day?\"      Augmentin  2. ANTIBIOTIC ONSET: \"When was the antibiotic started?\"      Tuesday   3. DIARRHEA SEVERITY: \"How bad is the diarrhea?\" \"How many more stools have you had in the past 24 hours than normal?\"       4 x   4. ONSET: \"When did the diarrhea begin?\"       Last night   5. BM CONSISTENCY: \"How loose or watery is the diarrhea?\"       watery  6. VOMITING: \"Are you also vomiting?\" If Yes, ask: \"How many times in the past 24 hours?\"       Denies however has nausea  7. ABDOMEN PAIN: \"Are you having any abdomen pain?\" If Yes, ask: \"What does it feel like?\" (e.g., crampy, dull, intermittent, constant)       Crampy , episodal  8. ABDOMEN PAIN SEVERITY: If present, ask: \"How bad is the pain?\"  (e.g., Scale 1-10; mild, moderate, or severe)       mild    10. HYDRATION: \"Any signs of dehydration?\" (e.g., dry mouth [not just dry lips], too weak to stand, dizziness, new weight loss) \"When did you last urinate?\"        Tired , urinated this morning   11. EXPOSURE: \"Have you traveled to a foreign country recently?\" \"Have you been exposed to anyone with diarrhea?\" \"Could you have eaten any food that was spoiled?\"        Denies   12. OTHER SYMPTOMS: \"Do you have any other symptoms?\" (e.g., fever, blood in stool)        Denies    Protocols used: Diarrhea on Antibiotics-Adult-[place]    "

## 2025-03-21 ENCOUNTER — APPOINTMENT (INPATIENT)
Dept: VASCULAR ULTRASOUND | Facility: HOSPITAL | Age: 45
DRG: 603 | End: 2025-03-21
Payer: COMMERCIAL

## 2025-03-21 PROBLEM — E53.8 VITAMIN B12 DEFICIENCY: Status: ACTIVE | Noted: 2025-03-21

## 2025-03-21 PROBLEM — L03.116 CELLULITIS OF LEFT LOWER LEG: Status: ACTIVE | Noted: 2025-03-21

## 2025-03-21 PROBLEM — E55.9 VITAMIN D DEFICIENCY: Status: ACTIVE | Noted: 2025-03-21

## 2025-03-21 LAB
ANION GAP SERPL CALCULATED.3IONS-SCNC: 3 MMOL/L (ref 4–13)
ANISOCYTOSIS BLD QL SMEAR: PRESENT
BACTERIA UR QL AUTO: ABNORMAL /HPF
BASOPHILS # BLD AUTO: 0.02 THOUSANDS/ÂΜL (ref 0–0.1)
BASOPHILS # BLD MANUAL: 0 THOUSAND/UL (ref 0–0.1)
BASOPHILS NFR BLD AUTO: 1 % (ref 0–1)
BASOPHILS NFR MAR MANUAL: 0 % (ref 0–1)
BILIRUB UR QL STRIP: NEGATIVE
BUN SERPL-MCNC: 7 MG/DL (ref 5–25)
CA-I BLD-SCNC: 1.37 MMOL/L (ref 1.12–1.32)
CALCIUM SERPL-MCNC: 9.7 MG/DL (ref 8.4–10.2)
CHLORIDE SERPL-SCNC: 114 MMOL/L (ref 96–108)
CLARITY UR: CLEAR
CO2 SERPL-SCNC: 24 MMOL/L (ref 21–32)
COLOR UR: ABNORMAL
CREAT SERPL-MCNC: 0.6 MG/DL (ref 0.6–1.3)
D DIMER PPP FEU-MCNC: <0.27 UG/ML FEU
EOSINOPHIL # BLD AUTO: 0.16 THOUSAND/ÂΜL (ref 0–0.61)
EOSINOPHIL # BLD MANUAL: 0.14 THOUSAND/UL (ref 0–0.4)
EOSINOPHIL NFR BLD AUTO: 4 % (ref 0–6)
EOSINOPHIL NFR BLD MANUAL: 3 % (ref 0–6)
ERYTHROCYTE [DISTWIDTH] IN BLOOD BY AUTOMATED COUNT: 15.8 % (ref 11.6–15.1)
ERYTHROCYTE [DISTWIDTH] IN BLOOD BY AUTOMATED COUNT: 15.8 % (ref 11.6–15.1)
GFR SERPL CREATININE-BSD FRML MDRD: 109 ML/MIN/1.73SQ M
GIANT PLATELETS BLD QL SMEAR: PRESENT
GLUCOSE SERPL-MCNC: 92 MG/DL (ref 65–140)
GLUCOSE UR STRIP-MCNC: NEGATIVE MG/DL
HCT VFR BLD AUTO: 34.4 % (ref 34.8–46.1)
HCT VFR BLD AUTO: 37.5 % (ref 34.8–46.1)
HGB BLD-MCNC: 11.1 G/DL (ref 11.5–15.4)
HGB BLD-MCNC: 12.3 G/DL (ref 11.5–15.4)
HGB UR QL STRIP.AUTO: NEGATIVE
IMM GRANULOCYTES # BLD AUTO: 0.01 THOUSAND/UL (ref 0–0.2)
IMM GRANULOCYTES NFR BLD AUTO: 0 % (ref 0–2)
KETONES UR STRIP-MCNC: NEGATIVE MG/DL
LEUKOCYTE ESTERASE UR QL STRIP: ABNORMAL
LYMPHOCYTES # BLD AUTO: 0.93 THOUSAND/UL (ref 0.6–4.47)
LYMPHOCYTES # BLD AUTO: 1.44 THOUSANDS/ÂΜL (ref 0.6–4.47)
LYMPHOCYTES # BLD AUTO: 19 % (ref 14–44)
LYMPHOCYTES NFR BLD AUTO: 37 % (ref 14–44)
MAGNESIUM SERPL-MCNC: 1.9 MG/DL (ref 1.9–2.7)
MCH RBC QN AUTO: 26.7 PG (ref 26.8–34.3)
MCH RBC QN AUTO: 26.7 PG (ref 26.8–34.3)
MCHC RBC AUTO-ENTMCNC: 32.3 G/DL (ref 31.4–37.4)
MCHC RBC AUTO-ENTMCNC: 32.8 G/DL (ref 31.4–37.4)
MCV RBC AUTO: 81 FL (ref 82–98)
MCV RBC AUTO: 83 FL (ref 82–98)
MONOCYTES # BLD AUTO: 0.28 THOUSAND/UL (ref 0–1.22)
MONOCYTES # BLD AUTO: 0.37 THOUSAND/ÂΜL (ref 0.17–1.22)
MONOCYTES NFR BLD AUTO: 10 % (ref 4–12)
MONOCYTES NFR BLD: 6 % (ref 4–12)
NEUTROPHILS # BLD AUTO: 1.87 THOUSANDS/ÂΜL (ref 1.85–7.62)
NEUTROPHILS # BLD MANUAL: 3.31 THOUSAND/UL (ref 1.85–7.62)
NEUTS SEG NFR BLD AUTO: 48 % (ref 43–75)
NEUTS SEG NFR BLD AUTO: 71 % (ref 43–75)
NITRITE UR QL STRIP: NEGATIVE
NON-SQ EPI CELLS URNS QL MICRO: ABNORMAL /HPF
NRBC BLD AUTO-RTO: 0 /100 WBCS
PH UR STRIP.AUTO: 7 [PH]
PHOSPHATE SERPL-MCNC: 2.1 MG/DL (ref 2.7–4.5)
PLATELET # BLD AUTO: 167 THOUSANDS/UL (ref 149–390)
PLATELET # BLD AUTO: 212 THOUSANDS/UL (ref 149–390)
PLATELET BLD QL SMEAR: ADEQUATE
PMV BLD AUTO: 12 FL (ref 8.9–12.7)
PMV BLD AUTO: 12.7 FL (ref 8.9–12.7)
POTASSIUM SERPL-SCNC: 3.9 MMOL/L (ref 3.5–5.3)
PROT UR STRIP-MCNC: NEGATIVE MG/DL
RBC # BLD AUTO: 4.16 MILLION/UL (ref 3.81–5.12)
RBC # BLD AUTO: 4.61 MILLION/UL (ref 3.81–5.12)
RBC #/AREA URNS AUTO: ABNORMAL /HPF
RBC MORPH BLD: PRESENT
SODIUM SERPL-SCNC: 141 MMOL/L (ref 135–147)
SP GR UR STRIP.AUTO: 1.01 (ref 1–1.03)
UROBILINOGEN UR STRIP-ACNC: <2 MG/DL
VARIANT LYMPHS # BLD AUTO: 1 %
WBC # BLD AUTO: 3.87 THOUSAND/UL (ref 4.31–10.16)
WBC # BLD AUTO: 4.66 THOUSAND/UL (ref 4.31–10.16)
WBC #/AREA URNS AUTO: ABNORMAL /HPF

## 2025-03-21 PROCEDURE — 87086 URINE CULTURE/COLONY COUNT: CPT | Performed by: EMERGENCY MEDICINE

## 2025-03-21 PROCEDURE — 81001 URINALYSIS AUTO W/SCOPE: CPT | Performed by: STUDENT IN AN ORGANIZED HEALTH CARE EDUCATION/TRAINING PROGRAM

## 2025-03-21 PROCEDURE — 83735 ASSAY OF MAGNESIUM: CPT

## 2025-03-21 PROCEDURE — 84100 ASSAY OF PHOSPHORUS: CPT

## 2025-03-21 PROCEDURE — 82330 ASSAY OF CALCIUM: CPT

## 2025-03-21 PROCEDURE — 93970 EXTREMITY STUDY: CPT

## 2025-03-21 PROCEDURE — 87081 CULTURE SCREEN ONLY: CPT

## 2025-03-21 PROCEDURE — 96365 THER/PROPH/DIAG IV INF INIT: CPT

## 2025-03-21 PROCEDURE — 99223 1ST HOSP IP/OBS HIGH 75: CPT | Performed by: INTERNAL MEDICINE

## 2025-03-21 PROCEDURE — 85379 FIBRIN DEGRADATION QUANT: CPT

## 2025-03-21 PROCEDURE — 85025 COMPLETE CBC W/AUTO DIFF WBC: CPT

## 2025-03-21 PROCEDURE — 80048 BASIC METABOLIC PNL TOTAL CA: CPT

## 2025-03-21 PROCEDURE — 96375 TX/PRO/DX INJ NEW DRUG ADDON: CPT

## 2025-03-21 PROCEDURE — 93970 EXTREMITY STUDY: CPT | Performed by: SURGERY

## 2025-03-21 PROCEDURE — 36415 COLL VENOUS BLD VENIPUNCTURE: CPT

## 2025-03-21 RX ORDER — SODIUM CHLORIDE 9 MG/ML
50 INJECTION, SOLUTION INTRAVENOUS CONTINUOUS
Status: DISCONTINUED | OUTPATIENT
Start: 2025-03-21 | End: 2025-03-21

## 2025-03-21 RX ORDER — ACETAMINOPHEN 325 MG/1
975 TABLET ORAL EVERY 8 HOURS PRN
Status: DISCONTINUED | OUTPATIENT
Start: 2025-03-21 | End: 2025-03-21

## 2025-03-21 RX ORDER — ACETAMINOPHEN 325 MG/1
975 TABLET ORAL EVERY 8 HOURS
Status: DISCONTINUED | OUTPATIENT
Start: 2025-03-21 | End: 2025-03-22 | Stop reason: HOSPADM

## 2025-03-21 RX ORDER — SODIUM CHLORIDE 9 MG/ML
50 INJECTION, SOLUTION INTRAVENOUS CONTINUOUS
Status: DISPENSED | OUTPATIENT
Start: 2025-03-21 | End: 2025-03-21

## 2025-03-21 RX ORDER — ENOXAPARIN SODIUM 100 MG/ML
40 INJECTION SUBCUTANEOUS DAILY
Status: DISCONTINUED | OUTPATIENT
Start: 2025-03-21 | End: 2025-03-22 | Stop reason: HOSPADM

## 2025-03-21 RX ORDER — DIPHENHYDRAMINE HYDROCHLORIDE 50 MG/ML
25 INJECTION, SOLUTION INTRAMUSCULAR; INTRAVENOUS ONCE
Status: COMPLETED | OUTPATIENT
Start: 2025-03-21 | End: 2025-03-21

## 2025-03-21 RX ORDER — NICOTINE 21 MG/24HR
1 PATCH, TRANSDERMAL 24 HOURS TRANSDERMAL DAILY
Status: DISCONTINUED | OUTPATIENT
Start: 2025-03-21 | End: 2025-03-22 | Stop reason: HOSPADM

## 2025-03-21 RX ADMIN — ACETAMINOPHEN 975 MG: 325 TABLET, FILM COATED ORAL at 14:17

## 2025-03-21 RX ADMIN — DOXYCYCLINE 100 MG: 100 INJECTION, POWDER, LYOPHILIZED, FOR SOLUTION INTRAVENOUS at 03:30

## 2025-03-21 RX ADMIN — ACETAMINOPHEN 975 MG: 325 TABLET, FILM COATED ORAL at 06:06

## 2025-03-21 RX ADMIN — NICOTINE 1 PATCH: 21 PATCH, EXTENDED RELEASE TRANSDERMAL at 08:42

## 2025-03-21 RX ADMIN — DIPHENHYDRAMINE HYDROCHLORIDE 25 MG: 50 INJECTION, SOLUTION INTRAMUSCULAR; INTRAVENOUS at 01:40

## 2025-03-21 RX ADMIN — VANCOMYCIN HYDROCHLORIDE 2000 MG: 5 INJECTION, POWDER, LYOPHILIZED, FOR SOLUTION INTRAVENOUS at 00:50

## 2025-03-21 RX ADMIN — CYANOCOBALAMIN TAB 500 MCG 1000 MCG: 500 TAB at 08:42

## 2025-03-21 RX ADMIN — SODIUM CHLORIDE 50 ML/HR: 0.9 INJECTION, SOLUTION INTRAVENOUS at 03:34

## 2025-03-21 RX ADMIN — ENOXAPARIN SODIUM 40 MG: 40 INJECTION SUBCUTANEOUS at 08:42

## 2025-03-21 RX ADMIN — DOXYCYCLINE 100 MG: 100 INJECTION, POWDER, LYOPHILIZED, FOR SOLUTION INTRAVENOUS at 14:20

## 2025-03-21 NOTE — ASSESSMENT & PLAN NOTE
Presents with left leg swelling and pain > 3 weeks duration.  Swelling slowly progressed from the foot to involve the entire leg.  Developed a blister on the shin 1 week into the swelling.  Blister ruptured, open wound dry with no pus.  O/E: Leg appears red, warm, tender, swollen.  Distal pulses intact  Normal range of motion across joints.  No fever or constitutional symptoms.  Vitals remained stable.  Labs unremarkable.  Duplex scan 03/04: No DVTs/superficial thrombophlebitis.  CT scan 3/17: Mild to moderate dorsal subcutaneous edema along the foot and extending into the lateral ankle.  No discrete abscess or soft tissue gas.  No acute osseous abnormality.  Has received 9 days course of antibiotics; keflex and Augmentin with no improvement.    Assessment: Left leg cellulitis failed outpatient treatment, will require MRSA coverage until MRSA is ruled out.  Patient started on IV vancomycin, but developed generalized body itchiness with no rash or difficulty breathing.  Vancomycin stopped, received Benadryl, with resolving symptoms.    PLAN  IV doxycycline 100 mg every 12.  Tylenol 975 mg Q 8 for pain  MRSA swab.  F/u blood cultures  Monitor for fever.  Daily CBC/BMP's.

## 2025-03-21 NOTE — ED ATTENDING ATTESTATION
3/20/2025  I, Duncan Valero MD, saw and evaluated the patient. I have discussed the patient with the resident/non-physician practitioner and agree with the resident's/non-physician practitioner's findings, Plan of Care, and MDM as documented in the resident's/non-physician practitioner's note, except where noted. All available labs and Radiology studies were reviewed.  I was present for key portions of any procedure(s) performed by the resident/non-physician practitioner and I was immediately available to provide assistance.       At this point I agree with the current assessment done in the Emergency Department.  I have conducted an independent evaluation of this patient a history and physical is as follows: Patient is a 44 year old female with several weeks of worsening redness and swelling of left lower extremity. Denies trauma. Had recent blister rupture of left lower leg. Last Tdap was in 2020 as per Epic. Was seen in this ED on 3/12/25 for cellulitis and placed on keflex. Was last seen at [facility] on 3/17/25 for cellulitis of left lower leg and was placed on augmentin and had bloodwork and CT of left lower extremity done which I reviewed. Has gotten N/V/D from augmentin. Was told to come to ED to get admitted for IV abx. [facility] website checked on this patient and last Rx filled was on 2/26/24 for oxycodone for 3 day supply. NCAT. Moist mucous membranes. No scleral icterus. Lungs clear. Heart regular without murmur. Abdomen soft and nontender. Good bowel sounds. (+) L LE swelling, erythema with ruptured vesicle noted anteriorly with swelling of dorsal left foot as well. DDX including but not limited to: cellulitis, doubt osteomyelitis since normal CT recently except for soft tissue edema, lymphangitis, folliculitis, carbuncle, abscess; rhabdomyolysis, adverse effect of augmentin, metabolic abnormality, dehydration; doubt necrotizing fasciitis, allergic reaction, angioedema, DVT  (had prior negative doppler US recently). Will check labs and give IV abx and will need admission.     ED Course         Critical Care Time  Procedures

## 2025-03-21 NOTE — ED NOTES
RN went into room to fix pump occlusion alarm. Patient was scratching her head and c/o generalized itching for the past 15-20 minutes. Vanco had been running for about an hour at this time. No hives present, pt denied CP, SOB, airway irritation and swelling, cough, and other anaphylactic SX. RN requesting benadryl at this time, provider agreeable. VSS. Vanco held. Benadryl administered.      Jada Chambers RN  03/21/25 0151

## 2025-03-21 NOTE — ED PROVIDER NOTES
Time reflects when diagnosis was documented in both MDM as applicable and the Disposition within this note       Time User Action Codes Description Comment    3/21/2025  2:16 AM Dickson Maria Add [L03.116] Cellulitis of left lower leg           ED Disposition       ED Disposition   Admit    Condition   Stable    Date/Time   Fri Mar 21, 2025  2:17 AM    Comment   Case was discussed with RONN and the patient's admission status was agreed to be Admission Status: Inpatient              Assessment & Plan       Medical Decision Making  Patient presents with:  Leg Pain: Pt c/o left lower leg and foot swelling and redness x 3 weeks. Started Amoxicillin on Wednesday which is the 2nd antibiotic. Told by PCP to come to ED for admission for IV antibiotics.   DDx includes soft tissue infection, abscess, osteomyelitis, traumatic injury, or other unknown process  Workup per ED course: Presentation consistent with left lower extremity cellulitis with failed outpatient treatment despite initiation of to oral antibiotics and will require IV antibiotics for treatment.   Patient discussed with [facility] who accepted for inpatient admission for further workup and resuscitation        Amount and/or Complexity of Data Reviewed  Labs: ordered. Decision-making details documented in ED Course.    Risk  Prescription drug management.  Decision regarding hospitalization.        ED Course as of 03/21/25 0919   u Mar 20, 2025   2243 4 weeks progressive LLE swelling with CT LLE evidence of cellulitis. Evaluated by vascular/PCP w/o DVT on Doppler studies. Diagnosed with LLE cellulitis s/p failed OP Abx x2 (Keflex/Augmentin).  Notes mild abdominal discomfort with associated nausea and 1 day loose stools after initiating antibiotics.  No other complaints at this time    Resting comfortably without acute distress  Cardiopulmonary exam reassuring  Abdomen soft nontender protuberant at baseline  Left lower extremity pitting edema to the  knee  Ambulates independently.  Motor/sensory intact distally    C/f soft tissue infection, abscess, osteomyelitis, traumatic injury, or other unknown process    Plan lab work, analgesics, IV antibiotics.   Imaging deferred at this time as patient recently underwent CT left lower extremity 3/17 consistent with cellulitis and recent Doppler study without evidence of DVT   2342 WBC: 4.66   2342 Hemoglobin: 12.3   2352 PT/INR within normal limits   Fri Mar 21, 2025   0053 PREGNANCY, SERUM: Negative   0053 LACTIC ACID: 0.9   0053 Total CK: 64   0053 Procalcitonin: <0.05   0054 ALK PHOS(!): 129   0138 Alerted by nursing that patient experienced itching after initiation of vancomycin infusion.      Patient assessed bedside, denies shortness of breath, chest pain, respiratory distress.  Able to swallow and speak without voice changes.    Plan IV Benadryl for symptomatic management we will continue to monitor   0218 Plan admission for IV antibiotics 2/2 failed OP treatment         Medications   cyanocobalamin (VITAMIN B-12) tablet 1,000 mcg (1,000 mcg Oral Given 3/21/25 0842)   nicotine (NICODERM CQ) 21 mg/24 hr TD 24 hr patch 1 patch (1 patch Transdermal Medication Applied 3/21/25 0842)   enoxaparin (LOVENOX) subcutaneous injection 40 mg (40 mg Subcutaneous Given 3/21/25 0842)   doxycycline (VIBRAMYCIN) 100 mg in sodium chloride 0.9 % 100 mL IVPB (0 mg Intravenous Stopped 3/21/25 0430)   sodium chloride 0.9 % infusion (50 mL/hr Intravenous New Bag 3/21/25 0334)   acetaminophen (TYLENOL) tablet 975 mg (975 mg Oral Given 3/21/25 0606)   piperacillin-tazobactam (ZOSYN) IVPB 4.5 g (0 g Intravenous Stopped 3/21/25 0046)   vancomycin (VANCOCIN) 2,000 mg in sodium chloride 0.9 % 500 mL IVPB (0 mg Intravenous Stopped 3/21/25 0302)   sodium chloride 0.9 % bolus 1,000 mL (0 mL Intravenous Stopped 3/21/25 0021)   diphenhydrAMINE (BENADRYL) injection 25 mg (25 mg Intravenous Given 3/21/25 0140)       ED Risk Strat Scores                             SBIRT 22yo+      Flowsheet Row Most Recent Value   Initial Alcohol Screen: US AUDIT-C     1. How often do you have a drink containing alcohol? 0 Filed at: 03/21/2025 0734   2. How many drinks containing alcohol do you have on a typical day you are drinking?  0 Filed at: 03/21/2025 0734   3b. FEMALE Any Age, or MALE 65+: How often do you have 4 or more drinks on one occassion? 0 Filed at: 03/21/2025 0734   Audit-C Score 0 Filed at: 03/21/2025 0734   TERRELL: How many times in the past year have you...    Used an illegal drug or used a prescription medication for non-medical reasons? Never Filed at: 03/21/2025 0734                            History of Present Illness       Chief Complaint   Patient presents with    Leg Pain     Pt c/o left lower leg and foot swelling and redness x 3 weeks. Started Amoxicillin on Wednesday which is the 2nd antibiotic. Told by PCP to come to ED for admission for IV antibiotics.        Past Medical History:   Diagnosis Date    Acute non-recurrent maxillary sinusitis 01/27/2020    Allergic     Seasonal    Anxiety     Bronchitis     Disease of thyroid gland 2023    Encounter for consultation 08/18/2020    Exposure to COVID-19 virus 11/24/2020    GERD (gastroesophageal reflux disease)     Kidney stone     Ovarian cyst     Pancreatitis     Psychiatric disorder     Upper respiratory tract infection 10/18/2019      Past Surgical History:   Procedure Laterality Date    APPENDECTOMY      CHOLECYSTECTOMY  2/26/24    GALLBLADDER SURGERY  02/2024    removal    [place] LAPAROSCOPY SURG CHOLECYSTECTOMY N/A 02/26/2024    Procedure: CHOLECYSTECTOMY LAPAROSCOPIC;  Surgeon: Angel Mera DO;  Location: [facility] MAIN OR;  Service: General    TUBAL LIGATION        Family History   Problem Relation Age of Onset    Depression Family     Diabetes Family     Hypertension Family     Schizophrenia Family     No Known Problems Mother     Alcohol abuse Father     Substance Abuse Father      Mental illness Father     Depression Father     Diabetes Father     Schizoaffective Disorder  Father     No Known Problems Sister     No Known Problems Daughter     No Known Problems Maternal Grandmother     No Known Problems Maternal Grandfather     No Known Problems Paternal Grandmother     No Known Problems Paternal Grandfather     No Known Problems Son     No Known Problems Son     No Known Problems Son     No Known Problems Paternal Aunt       Social History     Tobacco Use    Smoking status: Every Day     Current packs/day: 0.50     Average packs/day: 0.7 packs/day for 33.0 years (22.0 ttl pk-yrs)     Types: Cigarettes     Passive exposure: Current    Smokeless tobacco: Never   Vaping Use    Vaping status: Never Used   Substance Use Topics    Alcohol use: Yes     Comment: occassional    Drug use: No      E-Cigarette/Vaping    E-Cigarette Use Never User       E-Cigarette/Vaping Substances    Nicotine No     THC No     CBD No     Flavoring No       I have reviewed and agree with the history as documented.     Michelle Costa is a 44 y.o. female p/w 4 weeks progressive LLE swelling with CT LLE evidence of cellulitis. Evaluated by vascular/PCP w/o DVT on Doppler studies. Diagnosed with LLE cellulitis s/p failed OP Abx x2 (Keflex/Augmentin).  Notes mild abdominal discomfort with associated nausea and 1 day loose stools after initiating antibiotics.  No other complaints at this time    All other systems reviewed and are negative    HPI    Review of Systems        Objective       ED Triage Vitals   Temperature Pulse Blood Pressure Respirations SpO2 Patient Position - Orthostatic VS   03/20/25 2217 03/20/25 2217 03/20/25 2217 03/20/25 2217 03/20/25 2217 03/20/25 2217   97.9 °F (36.6 °C) 94 158/78 20 100 % Sitting      Temp Source Heart Rate Source BP Location FiO2 (%) Pain Score    03/20/25 2217 03/20/25 2217 03/20/25 2217 -- 03/20/25 2218    Oral Monitor Right arm  8      Vitals      Date and Time Temp Pulse SpO2 Resp BP  Pain Score FACES Pain Rating User   03/21/25 0645 -- 64 99 % 16 126/72 -- -- JR   03/21/25 0430 -- 75 98 % 16 118/70 -- --    03/21/25 0130 -- 79 99 % -- 124/73 -- --    03/20/25 2328 -- -- -- -- -- 8 --    03/20/25 2218 -- -- -- -- -- 8 -- DLB   03/20/25 2217 97.9 °F (36.6 °C) 94 100 % 20 158/78 -- -- KK            Physical Exam      General appearance: resting comfortably, no acute distress   HENT: Normocephalic, atraumatic, hearing grossly intact, and mucous membranes moist   Eyes: Conjunctiva normal,   Lungs/Chest: Respirations even and unlabored, speaking full sentences  Cardiovascular: Normal rate  Abdomen: soft, protuberant at baseline, non-tender  Musculoskeletal: extremities normal, atraumatic, Left lower extremity pitting edema to the knee  Ambulates independently.  Motor/sensory intact distally  Pulses: Dorsalis-pedis pulses palpable  Skin: warm and dry   Neurologic: Awake and alert, no apparent focal deficit  Psych: Mood consistent with affect     Wound/Incision:   Left lower extremity      Results Reviewed       Procedure Component Value Units Date/Time    Blood culture #1 [[id number]] Collected: 03/20/25 2318    Lab Status: Preliminary result Specimen: Blood from Arm, Right Updated: 03/21/25 0801     Blood Culture Received in Microbiology Lab. Culture in Progress.    Blood culture #2 [[id number]] Collected: 03/20/25 2324    Lab Status: Preliminary result Specimen: Blood from Arm, Left Updated: 03/21/25 0801     Blood Culture Received in Microbiology Lab. Culture in Progress.    Basic metabolic panel [[id number]]  (Abnormal) Collected: 03/21/25 0606    Lab Status: Final result Specimen: Blood from Arm, Right Updated: 03/21/25 0640     Sodium 141 mmol/L      Potassium 3.9 mmol/L      Chloride 114 mmol/L      CO2 24 mmol/L      ANION GAP 3 mmol/L      BUN 7 mg/dL      Creatinine 0.60 mg/dL      Glucose 92 mg/dL      Calcium 9.7 mg/dL      eGFR 109 ml/min/1.73sq m     Narrative:      National Kidney  Disease Foundation guidelines for Chronic Kidney Disease (CKD):     Stage 1 with normal or high GFR (GFR > 90 mL/min/1.73 square meters)    Stage 2 Mild CKD (GFR = 60-89 mL/min/1.73 square meters)    Stage 3A Moderate CKD (GFR = 45-59 mL/min/1.73 square meters)    Stage 3B Moderate CKD (GFR = 30-44 mL/min/1.73 square meters)    Stage 4 Severe CKD (GFR = 15-29 mL/min/1.73 square meters)    Stage 5 End Stage CKD (GFR <15 mL/min/1.73 square meters)  Note: GFR calculation is accurate only with a steady state creatinine    Magnesium [[id number]]  (Normal) Collected: 03/21/25 0606    Lab Status: Final result Specimen: Blood from Arm, Right Updated: 03/21/25 0640     Magnesium 1.9 mg/dL     Phosphorus [[id number]]  (Abnormal) Collected: 03/21/25 0606    Lab Status: Final result Specimen: Blood from Arm, Right Updated: 03/21/25 0640     Phosphorus 2.1 mg/dL     CBC and differential [[id number]]  (Abnormal) Collected: 03/21/25 0606    Lab Status: Final result Specimen: Blood from Arm, Right Updated: 03/21/25 0624     WBC 3.87 Thousand/uL      RBC 4.16 Million/uL      Hemoglobin 11.1 g/dL      Hematocrit 34.4 %      MCV 83 fL      MCH 26.7 pg      MCHC 32.3 g/dL      RDW 15.8 %      MPV 12.0 fL      Platelets 167 Thousands/uL      nRBC 0 /100 WBCs      Segmented % 48 %      Immature Grans % 0 %      Lymphocytes % 37 %      Monocytes % 10 %      Eosinophils Relative 4 %      Basophils Relative 1 %      Absolute Neutrophils 1.87 Thousands/µL      Absolute Immature Grans 0.01 Thousand/uL      Absolute Lymphocytes 1.44 Thousands/µL      Absolute Monocytes 0.37 Thousand/µL      Eosinophils Absolute 0.16 Thousand/µL      Basophils Absolute 0.02 Thousands/µL     MRSA culture [[id number]] Collected: 03/21/25 0335    Lab Status: In process Specimen: Nares from Nose Updated: 03/21/25 0356    Calcium, ionized [[id number]]  (Abnormal) Collected: 03/21/25 0302    Lab Status: Final result Specimen: Blood from Arm, Left Updated: 03/21/25 0316      Calcium, Ionized 1.37 mmol/L     Urine culture [[id number]] Collected: 03/21/25 0145    Lab Status: In process Specimen: Urine, Clean Catch Updated: 03/21/25 0206    Urine Microscopic [[id number]]  (Abnormal) Collected: 03/21/25 0145    Lab Status: Final result Specimen: Urine Updated: 03/21/25 0153     RBC, UA None Seen /hpf      WBC, UA 4-10 /hpf      Epithelial Cells Occasional /hpf      Bacteria, UA None Seen /hpf     UA w Reflex to Microscopic w Reflex to Culture [[id number]]  (Abnormal) Collected: 03/21/25 0145    Lab Status: Final result Specimen: Urine Updated: 03/21/25 0149     Color, UA Light Yellow     Clarity, UA Clear     Specific Gravity, UA 1.012     pH, UA 7.0     Leukocytes, UA Large     Nitrite, UA Negative     Protein, UA Negative mg/dl      Glucose, UA Negative mg/dl      Ketones, UA Negative mg/dl      Urobilinogen, UA <2.0 mg/dl      Bilirubin, UA Negative     Occult Blood, UA Negative    RBC Morphology Reflex Test [[id number]] Collected: 03/20/25 2318    Lab Status: Final result Specimen: Blood from Arm, Right Updated: 03/21/25 0101    CBC and differential [[id number]]  (Abnormal) Collected: 03/20/25 2318    Lab Status: Final result Specimen: Blood from Arm, Right Updated: 03/21/25 0019     WBC 4.66 Thousand/uL      RBC 4.61 Million/uL      Hemoglobin 12.3 g/dL      Hematocrit 37.5 %      MCV 81 fL      MCH 26.7 pg      MCHC 32.8 g/dL      RDW 15.8 %      MPV 12.7 fL      Platelets 212 Thousands/uL     Narrative:      This is an appended report.  These results have been appended to a previously verified report.    Manual Differential(PHLEBS Do Not Order) [[id number]]  (Abnormal) Collected: 03/20/25 2318    Lab Status: Final result Specimen: Blood from Arm, Right Updated: 03/21/25 0019     Segmented % 71 %      Lymphocytes % 19 %      Monocytes % 6 %      Eosinophils % 3 %      Basophils % 0 %      Atypical Lymphocytes % 1 %      Absolute Neutrophils 3.31 Thousand/uL      Absolute Lymphocytes 0.93  Thousand/uL      Absolute Monocytes 0.28 Thousand/uL      Absolute Eosinophils 0.14 Thousand/uL      Absolute Basophils 0.00 Thousand/uL      Total Counted --     RBC Morphology Present     Platelet Estimate Adequate     Giant PLTs Present     Anisocytosis Present    Comprehensive metabolic panel [[id number]]  (Abnormal) Collected: 03/20/25 2318    Lab Status: Final result Specimen: Blood from Arm, Right Updated: 03/20/25 2359     Sodium 139 mmol/L      Potassium 3.8 mmol/L      Chloride 110 mmol/L      CO2 27 mmol/L      ANION GAP 2 mmol/L      BUN 9 mg/dL      Creatinine 0.68 mg/dL      Glucose 89 mg/dL      Calcium 10.9 mg/dL      AST 13 U/L      ALT 17 U/L      Alkaline Phosphatase 129 U/L      Total Protein 7.0 g/dL      Albumin 4.2 g/dL      Total Bilirubin 0.36 mg/dL      eGFR 105 ml/min/1.73sq m     Narrative:      National Kidney Disease Foundation guidelines for Chronic Kidney Disease (CKD):     Stage 1 with normal or high GFR (GFR > 90 mL/min/1.73 square meters)    Stage 2 Mild CKD (GFR = 60-89 mL/min/1.73 square meters)    Stage 3A Moderate CKD (GFR = 45-59 mL/min/1.73 square meters)    Stage 3B Moderate CKD (GFR = 30-44 mL/min/1.73 square meters)    Stage 4 Severe CKD (GFR = 15-29 mL/min/1.73 square meters)    Stage 5 End Stage CKD (GFR <15 mL/min/1.73 square meters)  Note: GFR calculation is accurate only with a steady state creatinine    Procalcitonin [[id number]]  (Normal) Collected: 03/20/25 2318    Lab Status: Final result Specimen: Blood from Arm, Right Updated: 03/20/25 2358     Procalcitonin <0.05 ng/ml     hCG, qualitative pregnancy [[id number]]  (Normal) Collected: 03/20/25 2318    Lab Status: Final result Specimen: Blood from Arm, Right Updated: 03/20/25 2358     Preg, Serum Negative    Lactic acid [[id number]]  (Normal) Collected: 03/20/25 2318    Lab Status: Final result Specimen: Blood from Arm, Right Updated: 03/20/25 2354     LACTIC ACID 0.9 mmol/L     Narrative:      Result may be elevated  if tourniquet was used during collection.    CK [[id number]]  (Normal) Collected: 03/20/25 2318    Lab Status: Final result Specimen: Blood from Arm, Right Updated: 03/20/25 2352     Total CK 64 U/L     Protime-INR [[id number]]  (Normal) Collected: 03/20/25 2318    Lab Status: Final result Specimen: Blood from Arm, Right Updated: 03/20/25 2344     Protime 13.1 seconds      INR 0.93    Narrative:      INR Therapeutic Range    Indication                                             INR Range      Atrial Fibrillation                                               2.0-3.0  Hypercoagulable State                                    2.0.2.3  Left Ventricular Asist Device                            2.0-3.0  Mechanical Heart Valve                                  -    Aortic(with afib, MI, embolism, HF, LA enlargement,    and/or coagulopathy)                                     2.0-3.0 (2.5-3.5)     Mitral                                                             2.5-3.5  Prosthetic/Bioprosthetic Heart Valve               2.0-3.0  Venous thromboembolism (VTE: VT, PE        2.0-3.0    APTT [[id number]]  (Normal) Collected: 03/20/25 2318    Lab Status: Final result Specimen: Blood from Arm, Right Updated: 03/20/25 2344     PTT 27 seconds             No orders to display       Procedures    ED Medication and Procedure Management   Prior to Admission Medications   Prescriptions Last Dose Informant Patient Reported? Taking?   amoxicillin-clavulanate (AUGMENTIN) 875-125 mg per tablet 3/20/2025 at  9:00 PM  No Yes   Sig: Take 1 tablet by mouth every 12 (twelve) hours for 7 days   cholecalciferol (VITAMIN D3) 250 MCG (14096 UT) capsule   No No   Sig: Take 5 capsules (50,000 Units total) by mouth once a week for 8 doses   cyanocobalamin ([facility] Vitamin B-12) 1000 MCG tablet Past Week  No Yes   Sig: TAKE 1 TABLET BY MOUTH EVERY DAY   ibuprofen (MOTRIN) 600 mg tablet Not Taking  No No   Sig: Take 1 tablet (600 mg total) by mouth every 6 (six)  hours as needed for moderate pain   Patient not taking: No sig reported   nystatin (MYCOSTATIN) cream Past Week Self Yes Yes   Sig: Apply 1 Application topically 2 (two) times a day To affected area   triamcinolone (KENALOG) 0.1 % ointment Past Week Self Yes Yes   Sig: APPLY TWICE A DAY AS NEEDED FOR UP TO 2 WEEKS   valACYclovir (VALTREX) 500 mg tablet   No No   Sig: TAKE 1 TABLET BY MOUTH TWICE A DAY FOR 2 WEEKS      Facility-Administered Medications: None     Patient's Medications   Discharge Prescriptions    No medications on file     No discharge procedures on file.  ED SEPSIS DOCUMENTATION   Time reflects when diagnosis was documented in both MDM as applicable and the Disposition within this note       Time User Action Codes Description Comment    3/21/2025  2:16 AM Dickson Maria Add [L03.116] Cellulitis of left lower leg                  Dickson Maria MD  03/21/25 0919

## 2025-03-21 NOTE — H&P
H&P - Hospitalist   Name: Michelle Costa 44 y.o. female I MRN: [id number]  Unit/Bed#: ED-16 I Date of Admission: 3/20/2025   Date of Service: 3/21/2025 I Hospital Day: 0     Assessment & Plan  Cellulitis of left lower leg  Presents with left leg swelling and pain > 3 weeks duration.  Swelling slowly progressed from the foot to involve the entire leg.  Developed a blister on the shin 1 week into the swelling.  Blister ruptured, open wound dry with no pus.  O/E: Leg appears red, warm, tender, swollen.  Distal pulses intact  Normal range of motion across joints.  No fever or constitutional symptoms.  Vitals remained stable.  Labs unremarkable.  Duplex scan 03/04: No DVTs/superficial thrombophlebitis.  CT scan 3/17: Mild to moderate dorsal subcutaneous edema along the foot and extending into the lateral ankle.  No discrete abscess or soft tissue gas.  No acute osseous abnormality.  Has received 9 days course of antibiotics; keflex and Augmentin with no improvement.    Assessment: Left leg cellulitis failed outpatient treatment, will require MRSA coverage until MRSA is ruled out.  Patient started on IV vancomycin, but developed generalized body itchiness with no rash or difficulty breathing.  Vancomycin stopped, received Benadryl, with resolving symptoms.    PLAN  IV doxycycline 100 mg every 12.  Tylenol 975 mg Q 8 for pain  MRSA swab.  F/u blood cultures  Monitor for fever.  Daily CBC/BMP's.  Hyperparathyroidism (HCC)  PMHx hyperparathyroidism.  Suspicious parathyroid adenoma.  Last visit endocrinologist 6 months ago,  Series of labs ordered for patient, but she lost to f/u    POA: Labs significant for mildly elevated calcium 10.9, alk phos 129.    Plan  Ionized calcium  IVF N/S 50 cc/hr for 10 hrs   Consider 24 hr urine calcium  Consider Urine calcium: cr ratio  Vitamin B12 deficiency  PMHx of B12 deficiency  Patient is on B12 1000 mcg daily.  Most recent labs 9/9/24: 115    Plan  Continue B12 1000 mcg daily         VTE  Pharmacologic Prophylaxis: VTE Score: 3 Moderate Risk (Score 3-4) - Pharmacological DVT Prophylaxis Ordered: enoxaparin (Lovenox).  Code Status: No Order full code - level 1   Discussion with family: Patient declined call to .     Anticipated Length of Stay: Patient will be admitted on an inpatient basis with an anticipated length of stay of greater than 2 midnights secondary to cellulitis.    History of Present Illness   Chief Complaint: Left lower extremity swelling    Michelle Costa is a 44 y.o. female with a PMH of hyperparathyroidism, vitamin B12 deficiency, who presents with left lower extremity swelling and pain.    Patient had been in apparent state of health until 3 weeks ago when she suddenly noticed her left foot was swollen, swelling slowly progressed to involve the entire leg.    She first  presented to the ED on 3/4 with complaints of left foot swelling, duplex scan showed no DVT, patient was discharged home.    She represented to the ED on 3/12 which was placed on Keflex and asked to follow-up with vascular surgery.    Patient took Keflex for 5 days and was switched to Augmentin by her PCP which she has been taking for the past 4 days, with a total course of 9-days antibiotic with no improvement.    Swelling is associated with pain, she is able to walk without support, she denies any fevers, chills, endorses nausea, denies vomiting.    She has never had any similar symptom in the past, no history of DVTs, no history of trauma to the leg, no insect sting. Patient is a current smoker of about 1 to half a pack per day.    On examination, left leg appears moderately swollen compared to the right leg.  Swelling extends from the foot to the knee region, there is differential warmth, redness, tenderness to touch, peripheral pulses palpable, range of motion is normal across the joints.  There is an open wound 2 x 2 cm on the anterior lateral lower shin, no draining pus.    Vitals remained stable,  labs are unremarkable.    CT scan 3/17: Mild to moderate dorsal subcutaneous edema along the foot extending into the lateral ankle with no discrete abscess or soft tissue gas.    Doppler 3/04: No evidence of acute or chronic deep venous thrombosis/no evidence of superficial thrombophlebitis.      Review of Systems   Constitutional:  Negative for activity change, appetite change, chills and fever.   HENT:  Negative for congestion, ear discharge, ear pain and sore throat.    Eyes:  Negative for pain and visual disturbance.   Respiratory:  Negative for cough and shortness of breath.    Cardiovascular:  Negative for chest pain and palpitations.   Gastrointestinal:  Negative for abdominal pain and vomiting.   Endocrine: Negative for heat intolerance and polydipsia.   Genitourinary:  Negative for dysuria and hematuria.   Musculoskeletal:  Negative for arthralgias and back pain.   Skin:  Positive for color change (Left leg), pallor (Left leg) and wound (Left leg). Negative for rash.   Neurological:  Negative for seizures and syncope.   All other systems reviewed and are negative.      Historical Information   Past Medical History:   Diagnosis Date    Acute non-recurrent maxillary sinusitis 01/27/2020    Allergic     Seasonal    Anxiety     Bronchitis     Disease of thyroid gland 2023    Encounter for consultation 08/18/2020    Exposure to COVID-19 virus 11/24/2020    GERD (gastroesophageal reflux disease)     Kidney stone     Ovarian cyst     Pancreatitis     Psychiatric disorder     Upper respiratory tract infection 10/18/2019     Past Surgical History:   Procedure Laterality Date    APPENDECTOMY      CHOLECYSTECTOMY  2/26/24    GALLBLADDER SURGERY  02/2024    removal    RI LAPAROSCOPY SURG CHOLECYSTECTOMY N/A 02/26/2024    Procedure: CHOLECYSTECTOMY LAPAROSCOPIC;  Surgeon: Angel Mera DO;  Location: [facility] MAIN OR;  Service: General    TUBAL LIGATION       Social History     Tobacco Use    Smoking status: Every  Day     Current packs/day: 0.50     Average packs/day: 0.7 packs/day for 33.0 years (22.0 ttl pk-yrs)     Types: Cigarettes     Passive exposure: Current    Smokeless tobacco: Never   Vaping Use    Vaping status: Never Used   Substance and Sexual Activity    Alcohol use: Yes     Comment: occassional    Drug use: No    Sexual activity: Not Currently     Partners: Male     Birth control/protection: Female Sterilization     E-Cigarette/Vaping    E-Cigarette Use Never User      E-Cigarette/Vaping Substances    Nicotine No     THC No     CBD No     Flavoring No        Social History:  Marital Status: /Civil Union   Occupation:   Patient Pre-hospital Living Situation: Home, Apartment, With other family member:  and kids   Patient Pre-hospital Level of Mobility: walks  Patient Pre-hospital Diet Restrictions: None    Meds/Allergies   I have reviewed home medications with patient personally.  Prior to Admission medications    Medication Sig Start Date End Date Taking? Authorizing Provider   amoxicillin-clavulanate (AUGMENTIN) 875-125 mg per tablet Take 1 tablet by mouth every 12 (twelve) hours for 7 days 3/17/25 3/24/25 Yes Lea Reyes, MD   cyanocobalamin ([employer] Vitamin B-12) 1000 MCG tablet TAKE 1 TABLET BY MOUTH EVERY DAY 12/30/24  Yes Irma Alba MD   nystatin (MYCOSTATIN) cream Apply 1 Application topically 2 (two) times a day To affected area 11/26/23  Yes Historical Provider, MD   triamcinolone (KENALOG) 0.1 % ointment APPLY TWICE A DAY AS NEEDED FOR UP TO 2 WEEKS 11/26/23  Yes Historical Provider, MD   cholecalciferol (VITAMIN D3) 250 MCG (53358 UT) capsule Take 5 capsules (50,000 Units total) by mouth once a week for 8 doses 9/10/24 10/30/24  Irma Alba MD   ibuprofen (MOTRIN) 600 mg tablet Take 1 tablet (600 mg total) by mouth every 6 (six) hours as needed for moderate pain  Patient not taking: No sig reported 12/26/24   Cecilia Graves MD   valACYclovir (VALTREX) 500 mg tablet TAKE 1 TABLET BY MOUTH  "TWICE A DAY FOR 2 WEEKS 12/30/24 3/17/25  Lea Reyes, MD     Allergies   Allergen Reactions    Mucinex Cough For Kids [Dextromethorphan-Guaifenesin] Other (See Comments)     Per verbal of pt \"It causes body aches\"    Other Allergic Rhinitis       Objective :  Temp:  [97.9 °F (36.6 °C)] 97.9 °F (36.6 °C)  HR:  [79-94] 79  BP: (124-158)/(73-78) 124/73  Resp:  [20] 20  SpO2:  [99 %-100 %] 99 %  O2 Device: None (Room air)    Physical Exam  Vitals and nursing note reviewed.   Constitutional:       General: She is not in acute distress.     Appearance: She is well-developed.   HENT:      Head: Normocephalic and atraumatic.   Eyes:      Conjunctiva/sclera: Conjunctivae normal.   Cardiovascular:      Rate and Rhythm: Normal rate and regular rhythm.      Heart sounds: No murmur heard.  Pulmonary:      Effort: Pulmonary effort is normal. No respiratory distress.      Breath sounds: Normal breath sounds.   Abdominal:      Palpations: Abdomen is soft.      Tenderness: There is no abdominal tenderness.   Musculoskeletal:         General: No swelling.      Cervical back: Neck supple.      Left lower leg: Edema (swelling, redness, warmth, tender, open wound 2x2 cm on ant shin, no draining pus) present.   Skin:     General: Skin is warm and dry.      Capillary Refill: Capillary refill takes less than 2 seconds.   Neurological:      Mental Status: She is alert.   Psychiatric:         Mood and Affect: Mood normal.          Lines/Drains:            Lab Results: I have reviewed the following results:  Results from last 7 days   Lab Units 03/20/25  2318   WBC Thousand/uL 4.66   HEMOGLOBIN g/dL 12.3   HEMATOCRIT % 37.5   PLATELETS Thousands/uL 212   LYMPHO PCT % 19   MONO PCT % 6   EOS PCT % 3     Results from last 7 days   Lab Units 03/20/25  2318   SODIUM mmol/L 139   POTASSIUM mmol/L 3.8   CHLORIDE mmol/L 110*   CO2 mmol/L 27   BUN mg/dL 9   CREATININE mg/dL 0.68   ANION GAP mmol/L 2*   CALCIUM mg/dL 10.9*   ALBUMIN g/dL 4.2   TOTAL " BILIRUBIN mg/dL 0.36   ALK PHOS U/L 129*   ALT U/L 17   AST U/L 13   GLUCOSE RANDOM mg/dL 89     Results from last 7 days   Lab Units 03/20/25  2318   INR  0.93         Lab Results   Component Value Date    HGBA1C 5.5 09/28/2023    HGBA1C 5.3 05/23/2023     Results from last 7 days   Lab Units 03/20/25  2318   LACTIC ACID mmol/L 0.9   PROCALCITONIN ng/ml <0.05             Administrative Statements       ** Please Note: This note has been constructed using a voice recognition system. **

## 2025-03-21 NOTE — UTILIZATION REVIEW
Initial Clinical Review    Admission: Date/Time/Statement:   Admission Orders (From admission, onward)       Ordered        03/21/25 0210  Inpatient Admission  Once                          Orders Placed This Encounter   Procedures    Inpatient Admission     Standing Status:   Standing     Number of Occurrences:   1     Level of Care:   Med Surg [16]     Estimated length of stay:   More than 2 Midnights     Certification:   I certify that inpatient services are medically necessary for this patient for a duration of greater than two midnights. See H&P and MD Progress Notes for additional information about the patient's course of treatment.     ED Arrival Information       Expected   -    Arrival   3/20/2025 22:12    Acuity   Urgent              Means of arrival   Walk-In    Escorted by   Self    Service   Hospitalist    Admission type   Emergency              Arrival complaint   Cellulitis             Chief Complaint   Patient presents with    Leg Pain     Pt c/o left lower leg and foot swelling and redness x 3 weeks. Started Amoxicillin on Wednesday which is the 2nd antibiotic. Told by PCP to come to ED for admission for IV antibiotics.        Initial Presentation: 44 y.o. female with PMH of hyperparathyroidism, vitamin B12 deficiency , smopker  who presents to ED 3/20  from home with  left lower extremity swelling and pain.Per pt,  3 weeks ago when she suddenly noticed her left foot was swollen, swelling slowly progressed to involve the entire leg. presented to the ED on 3/4 with complaints of left foot swelling, duplex scan showed no DVT, patient was discharged home. epresented to the ED on 3/12 which was placed on Keflex and asked to follow-up with vascular surgery. Pt took Keflex for 5 days and was switched to Augmentin by her PCP which she has been taking for the past 4 days, with a total course of 9-days antibiotic with no improvement . On exam,  left leg appears moderately swollen compared to the right leg.   Swelling extends from the foot to the knee region, there is differential warmth, redness, tenderness to touch. Peripheral pulses palpable, range of motion is normal across the joints . Has open wound 2 x 2 cm on the anterior lateral lower shin, no draining pus. Per pt this was a blister that developed and ruptured . Labs: mildly elevated calcium 10.9, alk phos 129.  . CT 3/17 showed  mild to moderate dorsal subcutaneous edema along the foot extending into the lateral ankle with no discrete abscess or soft tissue gas. Pt given IV abx, IVF in ED. Admitted as Inpatient 3/21  with LLE cellulitis with failed outpt treatment . Hyperparathyroidism . PLan- IV Doxycycline , F/U blood cx . MRSA swab . Daily CBC, BMP . Pain control w/ Tylenol scheduled . Obtain ionized Ca . IVF- NSS x 10 h .  Consider 24 hr urine Ca , urine calcium:creat ratio  Anticipated Length of Stay/Certification Statement: Patient will be admitted on an inpatient basis with an anticipated length of stay of greater than 2 midnights secondary to cellulitis.       Date: 3/21 Update    Ionized ca 1.37, Ca improved to  9.7 with IVF . Recommend outpatient endocrinology follow-up. Alk phos 129 today . IVF infusing . LLE  edema up to the mid shin area, no calf tenderness, no erythema. She has a dried blister in the lower shin. No drainage or discharge. Mild increased warmth, pedal pulses difficult to palpate due to swelling. Trace right lower extremity edema   . WBC 3.87.  IV Doxycycline . F/U blood cx . Due to concerns for possible DVT, will check repeat duplex studies this admission. Continue empiric IV antibiotics with doxycycline but anticipate can likely discontinue in the next 24 hours if cultures remain negative, she remains afebrile and without leukocytosis. Procalcitonin also negative. Recommend elevate lower extremity, compression stockings .           Both photos 3/20/24.               ED Treatment-Medication Administration from 03/20/2025 8242 to  03/21/2025 0922         Date/Time Order Dose Route Action     03/20/2025 2324 piperacillin-tazobactam (ZOSYN) IVPB 4.5 g 4.5 g Intravenous New Bag     03/21/2025 0050 vancomycin (VANCOCIN) 2,000 mg in sodium chloride 0.9 % 500 mL IVPB 2,000 mg Intravenous New Bag     03/20/2025 2321 sodium chloride 0.9 % bolus 1,000 mL 1,000 mL Intravenous New Bag     03/21/2025 0140 diphenhydrAMINE (BENADRYL) injection 25 mg 25 mg Intravenous Given     03/21/2025 0842 cyanocobalamin (VITAMIN B-12) tablet 1,000 mcg 1,000 mcg Oral Given     03/21/2025 0842 nicotine (NICODERM CQ) 21 mg/24 hr TD 24 hr patch 1 patch 1 patch Transdermal Medication Applied     03/21/2025 0842 enoxaparin (LOVENOX) subcutaneous injection 40 mg 40 mg Subcutaneous Given     03/21/2025 0330 doxycycline (VIBRAMYCIN) 100 mg in sodium chloride 0.9 % 100 mL IVPB 100 mg Intravenous New Bag     03/21/2025 0334 sodium chloride 0.9 % infusion 50 mL/hr Intravenous New Bag     03/21/2025 0606 acetaminophen (TYLENOL) tablet 975 mg 975 mg Oral Given            Scheduled Medications:  acetaminophen, 975 mg, Oral, Q8H  cyanocobalamin, 1,000 mcg, Oral, Daily  doxycycline, 100 mg, Intravenous, Q12H  enoxaparin, 40 mg, Subcutaneous, Daily  nicotine, 1 patch, Transdermal, Daily      Continuous IV Infusions:  sodium chloride, 50 mL/hr, Intravenous, Continuous      PRN Meds:     ED Triage Vitals   Temperature Pulse Respirations Blood Pressure SpO2 Pain Score   03/20/25 2217 03/20/25 2217 03/20/25 2217 03/20/25 2217 03/20/25 2217 03/20/25 2218   97.9 °F (36.6 °C) 94 20 158/78 100 % 8     Weight (last 2 days)       None            Vital Signs (last 3 days)       Date/Time Temp Pulse Resp BP MAP (mmHg) SpO2 O2 Device Patient Position - Orthostatic VS Aditya Coma Scale Score Pain    03/21/25 0645 -- 64 16 126/72 -- 99 % None (Room air) Lying -- --    03/21/25 0549 -- -- -- -- -- -- -- -- 11 --    03/21/25 0430 -- 75 16 118/70 -- 98 % None (Room air) Lying -- --    03/21/25 0130 --  79 -- 124/73 91 99 % -- -- -- --    03/20/25 2328 -- -- -- -- -- -- -- -- 15 8 03/20/25 2221 -- -- -- -- -- -- None (Room air) -- -- --    03/20/25 2218 -- -- -- -- -- -- -- -- -- 8 03/20/25 2217 97.9 °F (36.6 °C) 94 20 158/78 110 100 % None (Room air) Sitting -- --              Pertinent Labs/Diagnostic Test Results:   Radiology:  No orders to display     Cardiology:  No orders to display     GI:  No orders to display           Results from last 7 days   Lab Units 03/21/25 0606 03/20/25 2318 03/17/25  1506   WBC Thousand/uL 3.87* 4.66 4.20*   HEMOGLOBIN g/dL 11.1* 12.3 12.4   HEMATOCRIT % 34.4* 37.5 37.4   PLATELETS Thousands/uL 167 212 199   TOTAL NEUT ABS Thousands/µL 1.87  --   --          Results from last 7 days   Lab Units 03/21/25 0606 03/21/25  0302 03/20/25 2318 03/17/25  1506   SODIUM mmol/L 141  --  139 141   POTASSIUM mmol/L 3.9  --  3.8 3.8   CHLORIDE mmol/L 114*  --  110* 110*   CO2 mmol/L 24  --  27 27   ANION GAP mmol/L 3*  --  2* 4   BUN mg/dL 7  --  9 10   CREATININE mg/dL 0.60  --  0.68 0.66   EGFR ml/min/1.73sq m 109  --  105 106   CALCIUM mg/dL 9.7  --  10.9* 10.9*   CALCIUM, IONIZED mmol/L  --  1.37*  --  1.46*   MAGNESIUM mg/dL 1.9  --   --   --    PHOSPHORUS mg/dL 2.1*  --   --   --      Results from last 7 days   Lab Units 03/20/25 2318 03/17/25  1506   AST U/L 13 10*   ALT U/L 17 12   ALK PHOS U/L 129* 128*   TOTAL PROTEIN g/dL 7.0 6.7  6.3*   ALBUMIN g/dL 4.2 4.1   TOTAL BILIRUBIN mg/dL 0.36 0.34         Results from last 7 days   Lab Units 03/21/25  0606 03/20/25  2318 03/17/25  1506   GLUCOSE RANDOM mg/dL 92 89 97           Results from last 7 days   Lab Units 03/20/25  2318   CK TOTAL U/L 64             Results from last 7 days   Lab Units 03/20/25  2318   PROTIME seconds 13.1   INR  0.93   PTT seconds 27         Results from last 7 days   Lab Units 03/20/25  2318   PROCALCITONIN ng/ml <0.05     Results from last 7 days   Lab Units 03/20/25  2318   LACTIC ACID mmol/L 0.9                    Results from last 7 days   Lab Units 03/21/25  0145   CLARITY UA  Clear   COLOR UA  Light Yellow   SPEC GRAV UA  1.012   PH UA  7.0   GLUCOSE UA mg/dl Negative   KETONES UA mg/dl Negative   BLOOD UA  Negative   PROTEIN UA mg/dl Negative   NITRITE UA  Negative   BILIRUBIN UA  Negative   UROBILINOGEN UA (BE) mg/dl <2.0   LEUKOCYTES UA  Large*   WBC UA /hpf 4-10*   RBC UA /hpf None Seen   BACTERIA UA /hpf None Seen   EPITHELIAL CELLS WET PREP /hpf Occasional               Results from last 7 days   Lab Units 03/20/25  2324 03/20/25  2318   BLOOD CULTURE  Received in Microbiology Lab. Culture in Progress. Received in Microbiology Lab. Culture in Progress.                   Past Medical History:   Diagnosis Date    Acute non-recurrent maxillary sinusitis 01/27/2020    Allergic     Seasonal    Anxiety     Bronchitis     Disease of thyroid gland 2023    Encounter for consultation 08/18/2020    Exposure to COVID-19 virus 11/24/2020    GERD (gastroesophageal reflux disease)     Kidney stone     Ovarian cyst     Pancreatitis     Psychiatric disorder     Upper respiratory tract infection 10/18/2019     Present on Admission:   Hyperparathyroidism (HCC)      Admitting Diagnosis: Cellulitis [L03.90]  Age/Sex: 44 y.o. female    Network Utilization Review Department  ATTENTION: Please call with any questions or concerns to [phone] and carefully listen to the prompts so that you are directed to the right person. All voicemails are confidential.   For Discharge needs, contact Care Management DC Support Team at [phone] opt. 2  Send all requests for admission clinical reviews, approved or denied determinations and any other requests to dedicated fax number below belonging to the campus where the patient is receiving treatment. List of dedicated fax numbers for the Facilities:  FACILITY NAME UR FAX NUMBER   ADMISSION DENIALS (Administrative/Medical Necessity) [phone]   DISCHARGE SUPPORT TEAM (NETWORK)  [phone]   PARENT CHILD HEALTH (Maternity/NICU/Pediatrics) [phone]   [facility] [phone]   [facility] [phone]   [facility] [phone]   [facility] [phone]   [facility] [phone]   [facility] [phone]   [facility] [phone]   [facility] [phone]   [facility] [phone]   Critical access hospital [phone]   [facility] [phone]   [facility] [phone]

## 2025-03-21 NOTE — ASSESSMENT & PLAN NOTE
Presents with left leg swelling and pain > 3 weeks duration.  Swelling slowly progressed from the foot to involve the entire leg.  Duplex scan 03/04: No DVTs/superficial thrombophlebitis.  CT scan 3/17: Mild to moderate dorsal subcutaneous edema along the foot and extending into the lateral ankle.   Was on doxycycline but discharged prior to discharge  3/21/2025 VAS venous duplex- Small varicosities noted along the shin and in area of wound. No DVT. MRSA-negative   Suspect venous insufficiency/edema/ low suspicion for cellulitis  Symptoms improved        PLAN  Discontinue  doxycycline after 2 days  Compression stocking order   follow-up with primary doctor in 1 week

## 2025-03-21 NOTE — ASSESSMENT & PLAN NOTE
PMHx hyperparathyroidism.  Suspicious parathyroid adenoma.  Last visit endocrinologist 6 months ago,  Series of labs ordered for patient, but she lost to f/u    POA: Labs significant for mildly elevated calcium 10.9, alk phos 129.    Plan  Ionized calcium  IVF N/S 50 cc/hr for 10 hrs   Consider 24 hr urine calcium  Consider Urine calcium: cr ratio

## 2025-03-21 NOTE — ASSESSMENT & PLAN NOTE
PMHx hyperparathyroidism.  Suspicious parathyroid adenoma.  Last visit endocrinologist 6 months ago,  Series of labs ordered for patient, but she lost to f/u    POA: Labs significant for mildly elevated calcium 10.9, alk phos 129.    Plan  Please follow-up with PCP

## 2025-03-21 NOTE — ASSESSMENT & PLAN NOTE
PMHx of B12 deficiency  Patient is on B12 1000 mcg daily.  Most recent labs 9/9/24: 115    Plan  Continue B12 1000 mcg daily

## 2025-03-22 VITALS
HEART RATE: 83 BPM | OXYGEN SATURATION: 100 % | RESPIRATION RATE: 14 BRPM | SYSTOLIC BLOOD PRESSURE: 140 MMHG | TEMPERATURE: 98.5 F | DIASTOLIC BLOOD PRESSURE: 95 MMHG

## 2025-03-22 LAB
ANION GAP SERPL CALCULATED.3IONS-SCNC: 2 MMOL/L (ref 4–13)
BACTERIA UR CULT: NORMAL
BUN SERPL-MCNC: 9 MG/DL (ref 5–25)
CALCIUM SERPL-MCNC: 10.3 MG/DL (ref 8.4–10.2)
CHLORIDE SERPL-SCNC: 115 MMOL/L (ref 96–108)
CO2 SERPL-SCNC: 24 MMOL/L (ref 21–32)
CREAT SERPL-MCNC: 0.57 MG/DL (ref 0.6–1.3)
ERYTHROCYTE [DISTWIDTH] IN BLOOD BY AUTOMATED COUNT: 15.6 % (ref 11.6–15.1)
GFR SERPL CREATININE-BSD FRML MDRD: 111 ML/MIN/1.73SQ M
GLUCOSE SERPL-MCNC: 100 MG/DL (ref 65–140)
HCT VFR BLD AUTO: 35.4 % (ref 34.8–46.1)
HGB BLD-MCNC: 11.3 G/DL (ref 11.5–15.4)
MCH RBC QN AUTO: 26.3 PG (ref 26.8–34.3)
MCHC RBC AUTO-ENTMCNC: 31.9 G/DL (ref 31.4–37.4)
MCV RBC AUTO: 83 FL (ref 82–98)
MRSA NOSE QL CULT: NORMAL
PLATELET # BLD AUTO: 164 THOUSANDS/UL (ref 149–390)
PMV BLD AUTO: 12.8 FL (ref 8.9–12.7)
POTASSIUM SERPL-SCNC: 3.9 MMOL/L (ref 3.5–5.3)
RBC # BLD AUTO: 4.29 MILLION/UL (ref 3.81–5.12)
SODIUM SERPL-SCNC: 141 MMOL/L (ref 135–147)
WBC # BLD AUTO: 3.49 THOUSAND/UL (ref 4.31–10.16)

## 2025-03-22 PROCEDURE — 80048 BASIC METABOLIC PNL TOTAL CA: CPT

## 2025-03-22 PROCEDURE — 99239 HOSP IP/OBS DSCHRG MGMT >30: CPT | Performed by: INTERNAL MEDICINE

## 2025-03-22 PROCEDURE — 85027 COMPLETE CBC AUTOMATED: CPT

## 2025-03-22 RX ADMIN — NICOTINE 1 PATCH: 21 PATCH, EXTENDED RELEASE TRANSDERMAL at 08:11

## 2025-03-22 RX ADMIN — DOXYCYCLINE 100 MG: 100 INJECTION, POWDER, LYOPHILIZED, FOR SOLUTION INTRAVENOUS at 03:56

## 2025-03-22 RX ADMIN — ENOXAPARIN SODIUM 40 MG: 40 INJECTION SUBCUTANEOUS at 08:11

## 2025-03-22 RX ADMIN — CYANOCOBALAMIN TAB 500 MCG 1000 MCG: 500 TAB at 08:12

## 2025-03-22 NOTE — PLAN OF CARE
Problem: SAFETY ADULT  Goal: Patient will remain free of falls  Description: INTERVENTIONS:- Educate patient/family on patient safety including physical limitations- Instruct patient to call for assistance with activity - Consult OT/PT to assist with strengthening/mobility - Keep Call bell within reach- Keep bed low and locked with side rails adjusted as appropriate- Keep care items and personal belongings within reach- Initiate and maintain comfort rounds- Make Fall Risk Sign visible to staff-  Hours, in advance of need- Initiate/Maintain  alarm- Obtain necessary fall risk management equipment:  - Apply yellow socks and bracelet for high fall risk patients- Consider moving patient to room near nurses station  Outcome: Progressing  Goal: Maintain or return to baseline ADL function  Description: INTERVENTIONS:-  Assess patient's ability to carry out ADLs; assess patient's baseline for ADL function and identify physical deficits which impact ability to perform ADLs (bathing, care of mouth/teeth, toileting, grooming, dressing, etc.)- Assess/evaluate cause of self-care deficits - Assess range of motion- Assess patient's mobility; develop plan if impaired- Assess patient's need for assistive devices and provide as appropriate- Encourage maximum independence but intervene and supervise when necessary- Involve family in performance of ADLs- Assess for home care needs following discharge - Consider OT consult to assist with ADL evaluation and planning for discharge- Provide patient education as appropriate  Outcome: Progressing     Problem: PAIN - ADULT  Goal: Verbalizes/displays adequate comfort level or baseline comfort level  Description: Interventions:- Encourage patient to monitor pain and request assistance- Assess pain using appropriate pain scale- Administer analgesics based on type and severity of pain and evaluate response- Implement non-pharmacological measures as appropriate and evaluate response- Consider  cultural and social influences on pain and pain management- Notify physician/advanced practitioner if interventions unsuccessful or patient reports new pain  Outcome: Progressing

## 2025-03-22 NOTE — DISCHARGE SUMMARY
Discharge Summary - Hospitalist   Name: Michelle Costa 44 y.o. female I MRN: [id number]  Unit/Bed#: S -01 I Date of Admission: 3/20/2025   Date of Service: 3/22/2025 I Hospital Day: 1     Assessment & Plan  Cellulitis of left lower leg  Presents with left leg swelling and pain > 3 weeks duration.  Swelling slowly progressed from the foot to involve the entire leg.  Duplex scan 03/04: No DVTs/superficial thrombophlebitis.  CT scan 3/17: Mild to moderate dorsal subcutaneous edema along the foot and extending into the lateral ankle.   Was on doxycycline but discharged prior to discharge  3/21/2025 VAS venous duplex- Small varicosities noted along the shin and in area of wound. No DVT. MRSA-negative   Suspect venous insufficiency/edema/ low suspicion for cellulitis  Symptoms improved        PLAN  Discontinue  doxycycline after 2 days  Compression stocking order   follow-up with primary doctor in 1 week      Hyperparathyroidism (HCC)  PMHx hyperparathyroidism.  Suspicious parathyroid adenoma.  Last visit endocrinologist 6 months ago,  Series of labs ordered for patient, but she lost to f/u    POA: Labs significant for mildly elevated calcium 10.9, alk phos 129.    Plan  Please follow-up with PCP  Vitamin B12 deficiency  PMHx of B12 deficiency  Patient is on B12 1000 mcg daily.  Most recent labs 9/9/24: 115    Plan  Continue B12 1000 mcg daily        Medical Problems       Resolved Problems  Date Reviewed: 3/21/2025   None       Discharging Physician / Practitioner: Lainey Salazar MD  PCP: Lea Reyes, MD  Admission Date:   Admission Orders (From admission, onward)       Ordered        03/21/25 0210  Inpatient Admission  Once                          Discharge Date: 03/22/25    Consultations During Hospital Stay:  None    Procedures Performed:   None    Significant Findings / Test Results:   None    Incidental Findings:    None      Test Results Pending at Discharge (will require follow up):   None     Outpatient Tests  Requested:  None    Complications:  None    Reason for Admission: None    Hospital Course:   Michelle Costa is a 44 y.o. female patient with past medical history of hyperparathyroidism, and B12 deficiency who originally presented to the hospital on 3/20/2025 due to left lower extremity swelling and pain. She was started on vancomycin, however stopped due to generalized body pruritus.Vancomycin was transitioned to doxycycline.  Infectious workup was unremarkable, so antibiotic was stopped.  Patient underwent venous duplex of the lower left leg which showed Small varicosities noted along the shin and in area of wound with no DVT.   Compression stocking was ordered with the recommendation to elevate  the left lower leg while laying in bed. Patient will Follow-up with primary doctor in 1 week.          Please see above list of diagnoses and related plan for additional information.     Condition at Discharge: good    Discharge Day Visit / Exam:   Subjective:  No acute event overnight. This morning, patient had improvement in the lower extremity. She denied fever, chill, abdominal pain, nausea and vomiting.   Vitals: Blood Pressure: 140/95 (03/22/25 0756)  Pulse: 83 (03/22/25 0915)  Temperature: 98.5 °F (36.9 °C) (03/22/25 0756)  Temp Source: Oral (03/22/25 0756)  Respirations: 14 (03/22/25 0915)  SpO2: 100 % (03/22/25 0915)  Physical Exam  Vitals and nursing note reviewed.   Constitutional:       General: She is not in acute distress.     Appearance: She is well-developed.   HENT:      Head: Normocephalic and atraumatic.      Mouth/Throat:      Mouth: Mucous membranes are moist.   Eyes:      Conjunctiva/sclera: Conjunctivae normal.   Cardiovascular:      Rate and Rhythm: Normal rate and regular rhythm.      Heart sounds: No murmur heard.  Pulmonary:      Effort: Pulmonary effort is normal. No respiratory distress.      Breath sounds: Normal breath sounds.   Abdominal:      General: Abdomen is flat. Bowel sounds are normal.       Palpations: Abdomen is soft.      Tenderness: There is no abdominal tenderness.   Musculoskeletal:         General: No swelling.      Cervical back: Neck supple.      Left lower leg: Edema present.   Skin:     General: Skin is warm and dry.      Capillary Refill: Capillary refill takes less than 2 seconds.   Neurological:      Mental Status: She is alert and oriented to person, place, and time.   Psychiatric:         Mood and Affect: Mood normal.          Discussion with Family: Patient declined call to .     Discharge instructions/Information to patient and family:   See after visit summary for information provided to patient and family.      Provisions for Follow-Up Care:  See after visit summary for information related to follow-up care and any pertinent home health orders.      Mobility at time of Discharge:   Basic Mobility Inpatient Raw Score: 24  JH-HLM Goal: 8: Walk 250 feet or more  JH-HLM Achieved: 8: Walk 250 feet ot more  HLM Goal achieved. Continue to encourage appropriate mobility.     Disposition:   Home    Planned Readmission: None    Discharge Medications:  See after visit summary for reconciled discharge medications provided to patient and/or family.      Administrative Statements   Discharge Statement:      **Please Note: This note may have been constructed using a voice recognition system**

## 2025-03-22 NOTE — DISCHARGE INSTR - AVS FIRST PAGE
Dear Michelle Costa,     It was our pleasure to care for you here at [facility].  It is our hope that we were always able to exceed the expected standards for your care during your stay.  You were hospitalized due to edema of the left lower leg.  You were cared for on the second floor by Lainey Salazar MD under the service of Janeth Woody MD with the [facility] who covers for your primary care physician (PCP), Lea Reyes, MD, while you were hospitalized.  If you have any questions or concerns related to this hospitalization, you may contact us at .  For follow up as well as any medication refills, we recommend that you follow up with your primary care physician.  A registered nurse will reach out to you by phone within a few days after your discharge to answer any additional questions that you may have after going home.  However, at this time we provide for you here, the most important instructions / recommendations at discharge:     Notable Medication Adjustments -   None  Compression stocking has been added.  Use when standing for a long time or walking long distance.  Testing Required after Discharge -   None    Important follow up information -   Please follow-up with your primary doctor in 1 week  Other Instructions -   Please return to the emergency department if you are having fever, chills,worsening symptoms of the  lower extremity   Please review this entire after visit summary as additional general instructions including medication list, appointments, activity, diet, any pertinent wound care, and other additional recommendations from your care team that may be provided for you.      Sincerely,     Lainey Salazar MD

## 2025-03-22 NOTE — UTILIZATION REVIEW
Continued Stay Review    SEE INITIAL REVIEW AT BOTTOM    Date: 3/22/25                          Current Patient Class: Inpatient  Current Level of Care: Med Surg    HPI:44 y.o. female initially admitted on 3/21     Current Diagnosis: LLE cellulitis    Assessment/Plan:     Date: 3/22 Day: 2    Internal medicine: Discontinue doxycycline after 2 days.  Patient underwent venous duplex of the lower left leg which showed Small varicosities noted along the shin and in area of wound with no DVT. Compression stocking order. Labs significant for mildly elevated calcium 10.9, alk phos 129. Continue B12 1000 mcg daily.     Medications:   Scheduled Medications:  acetaminophen, 975 mg, Oral, Q8H  cyanocobalamin, 1,000 mcg, Oral, Daily  enoxaparin, 40 mg, Subcutaneous, Daily  nicotine, 1 patch, Transdermal, Daily      Continuous IV Infusions:     PRN Meds:     Discharge Plan: TBD    Vital Signs (last 3 days)       Date/Time Temp Pulse Resp BP MAP (mmHg) SpO2 O2 Device Patient Position - Orthostatic VS [place] Coma Scale Score Pain    03/22/25 0915 -- 83 14 -- -- 100 % None (Room air) -- -- --    03/22/25 0800 -- -- -- -- -- -- -- -- 15 No Pain    03/22/25 07:56:24 98.5 °F (36.9 °C) -- -- 140/95 110 -- None (Room air) -- -- --    03/21/25 22:15:39 98.1 °F (36.7 °C) 79 16 142/86 105 93 % -- -- -- --    03/21/25 2000 -- -- -- -- -- -- -- -- 15 No Pain    03/21/25 17:17:24 97.8 °F (36.6 °C) 59 18 159/94 116 99 % -- -- -- --    03/21/25 1714 -- -- -- -- -- -- -- -- 15 No Pain    03/21/25 1417 -- -- -- -- -- -- -- -- -- 5    03/21/25 1215 -- 71 16 127/74 -- 99 % None (Room air) -- -- 5    03/21/25 0645 -- 64 16 126/72 -- 99 % None (Room air) Lying -- --    03/21/25 0549 -- -- -- -- -- -- -- -- 11 --    03/21/25 0430 -- 75 16 118/70 -- 98 % None (Room air) Lying -- --    03/21/25 0130 -- 79 -- 124/73 91 99 % -- -- -- --    03/20/25 2328 -- -- -- -- -- -- -- -- 15 8    03/20/25 2221 -- -- -- -- -- -- None (Room air) -- -- --    03/20/25  2218 -- -- -- -- -- -- -- -- -- 8    03/20/25 2217 97.9 °F (36.6 °C) 94 20 158/78 110 100 % None (Room air) Sitting -- --          Weight (last 2 days)       None            Pertinent Labs/Diagnostic Results:   Radiology:   VAS VENOUS DUPLEX - LOWER LIMB BILATERAL   Final Interpretation by Capo Mckeon MD (03/21 1420)        Cardiology:  No orders to display     GI:  No orders to display           Results from last 7 days   Lab Units 03/22/25 0421 03/21/25 0606 03/20/25 2318 03/17/25  1506   WBC Thousand/uL 3.49* 3.87* 4.66 4.20*   HEMOGLOBIN g/dL 11.3* 11.1* 12.3 12.4   HEMATOCRIT % 35.4 34.4* 37.5 37.4   PLATELETS Thousands/uL 164 167 212 199   TOTAL NEUT ABS Thousands/µL  --  1.87  --   --          Results from last 7 days   Lab Units 03/22/25 0420 03/21/25 0606 03/21/25 0302 03/20/25 2318 03/17/25  1506   SODIUM mmol/L 141 141  --  139 141   POTASSIUM mmol/L 3.9 3.9  --  3.8 3.8   CHLORIDE mmol/L 115* 114*  --  110* 110*   CO2 mmol/L 24 24  --  27 27   ANION GAP mmol/L 2* 3*  --  2* 4   BUN mg/dL 9 7  --  9 10   CREATININE mg/dL 0.57* 0.60  --  0.68 0.66   EGFR ml/min/1.73sq m 111 109  --  105 106   CALCIUM mg/dL 10.3* 9.7  --  10.9* 10.9*   CALCIUM, IONIZED mmol/L  --   --  1.37*  --  1.46*   MAGNESIUM mg/dL  --  1.9  --   --   --    PHOSPHORUS mg/dL  --  2.1*  --   --   --      Results from last 7 days   Lab Units 03/20/25 2318 03/17/25  1506   AST U/L 13 10*   ALT U/L 17 12   ALK PHOS U/L 129* 128*   TOTAL PROTEIN g/dL 7.0 6.7  6.3*   ALBUMIN g/dL 4.2 4.1   TOTAL BILIRUBIN mg/dL 0.36 0.34         Results from last 7 days   Lab Units 03/22/25  0420 03/21/25  0606 03/20/25  2318 03/17/25  1506   GLUCOSE RANDOM mg/dL 100 92 89 97           Results from last 7 days   Lab Units 03/20/25  2318   CK TOTAL U/L 64         Results from last 7 days   Lab Units 03/21/25  1529   D-DIMER QUANTITATIVE ug/ml FEU <0.27     Results from last 7 days   Lab Units 03/20/25  2318   PROTIME seconds 13.1   INR  0.93    PTT seconds 27         Results from last 7 days   Lab Units 03/20/25 2318   PROCALCITONIN ng/ml <0.05     Results from last 7 days   Lab Units 03/20/25  2318   LACTIC ACID mmol/L 0.9           Results from last 7 days   Lab Units 03/21/25  0145   CLARITY UA  Clear   COLOR UA  Light Yellow   SPEC GRAV UA  1.012   PH UA  7.0   GLUCOSE UA mg/dl Negative   KETONES UA mg/dl Negative   BLOOD UA  Negative   PROTEIN UA mg/dl Negative   NITRITE UA  Negative   BILIRUBIN UA  Negative   UROBILINOGEN UA (BE) mg/dl <2.0   LEUKOCYTES UA  Large*   WBC UA /hpf 4-10*   RBC UA /hpf None Seen   BACTERIA UA /hpf None Seen   EPITHELIAL CELLS WET PREP /hpf Occasional           Results from last 7 days   Lab Units 03/21/25  0145 03/20/25 2324 03/20/25 2318   BLOOD CULTURE   --  No Growth at 24 hrs. No Growth at 24 hrs.   URINE CULTURE  No Growth <1000 cfu/mL  --   --          Network Utilization Review Department  ATTENTION: Please call with any questions or concerns to [phone] and carefully listen to the prompts so that you are directed to the right person. All voicemails are confidential.   For Discharge needs, contact Care Management DC Support Team at [phone] opt. 2  Send all requests for admission clinical reviews, approved or denied determinations and any other requests to dedicated fax number below belonging to the campus where the patient is receiving treatment. List of dedicated fax numbers for the Facilities:  FACILITY NAME UR FAX NUMBER   ADMISSION DENIALS (Administrative/Medical Necessity) [phone]   DISCHARGE SUPPORT TEAM (NETWORK) [phone]   PARENT CHILD HEALTH (Maternity/NICU/Pediatrics) [phone]   [facility] [phone]   [facility] [phone]   [facility] [phone]   [facility] [phone]   [facility] [phone]   [facility]  [phone]   [facility] [phone]   [facility] [phone]   [facility] [phone]   [facility] [phone]   [facility] [phone]   [facility] [phone]

## 2025-03-24 NOTE — UTILIZATION REVIEW
NOTIFICATION OF ADMISSION DISCHARGE   This is a Notification of Discharge from [facility]. Please be advised that this patient has been discharge from our facility. Below you will find the admission and discharge date and time including the patient’s disposition.   UTILIZATION REVIEW CONTACT:  Aparna Bocanegra  Utilization   Network Utilization Review Department  Phone: [phone] x carefully listen to the prompts. All voicemails are confidential.  Email: [email or web address]     ADMISSION INFORMATION  PRESENTATION DATE: 3/20/2025 10:18 PM  OBERVATION ADMISSION DATE: N/A  INPATIENT ADMISSION DATE: 3/21/25  2:10 AM   DISCHARGE DATE: 3/22/2025  3:20 PM   DISPOSITION:Home/Self Care    Network Utilization Review Department  ATTENTION: Please call with any questions or concerns to [phone] and carefully listen to the prompts so that you are directed to the right person. All voicemails are confidential.   For Discharge needs, contact Care Management DC Support Team at [phone] opt. 2  Send all requests for admission clinical reviews, approved or denied determinations and any other requests to dedicated fax number below belonging to the campus where the patient is receiving treatment. List of dedicated fax numbers for the Facilities:  FACILITY NAME UR FAX NUMBER   ADMISSION DENIALS (Administrative/Medical Necessity) [phone]   DISCHARGE SUPPORT TEAM ([facility]) [phone]   PARENT CHILD HEALTH (Maternity/NICU/Pediatrics) [phone]   [facility] [phone]   [facility] [phone]   [facility] [phone]   [facility] [phone]   [facility] [phone]   [facility] [phone]   [facility] [phone]   [facility] [phone]    [facility] [phone]   [facility] [phone]   [facility] [phone]   [facility] [phone]

## 2025-03-25 ENCOUNTER — OFFICE VISIT (OUTPATIENT)
Dept: INTERNAL MEDICINE CLINIC | Facility: CLINIC | Age: 45
End: 2025-03-25
Payer: COMMERCIAL

## 2025-03-25 ENCOUNTER — ANNUAL EXAM (OUTPATIENT)
Dept: OBGYN CLINIC | Facility: CLINIC | Age: 45
End: 2025-03-25
Payer: COMMERCIAL

## 2025-03-25 ENCOUNTER — HOSPITAL ENCOUNTER (OUTPATIENT)
Dept: NUCLEAR MEDICINE | Facility: HOSPITAL | Age: 45
Discharge: HOME | End: 2025-03-25
Attending: INTERNAL MEDICINE
Payer: COMMERCIAL

## 2025-03-25 VITALS
HEART RATE: 90 BPM | OXYGEN SATURATION: 98 % | HEIGHT: 67 IN | SYSTOLIC BLOOD PRESSURE: 126 MMHG | DIASTOLIC BLOOD PRESSURE: 70 MMHG | WEIGHT: 229 LBS | TEMPERATURE: 96.6 F | BODY MASS INDEX: 35.94 KG/M2

## 2025-03-25 VITALS
WEIGHT: 228.8 LBS | BODY MASS INDEX: 35.91 KG/M2 | HEIGHT: 67 IN | DIASTOLIC BLOOD PRESSURE: 90 MMHG | SYSTOLIC BLOOD PRESSURE: 144 MMHG

## 2025-03-25 DIAGNOSIS — N89.8 VAGINAL DISCHARGE: ICD-10-CM

## 2025-03-25 DIAGNOSIS — E21.3 HYPERPARATHYROIDISM (HCC): ICD-10-CM

## 2025-03-25 DIAGNOSIS — L29.2 VULVOVAGINAL ITCHING: ICD-10-CM

## 2025-03-25 DIAGNOSIS — R22.42 LOCALIZED SWELLING OF LEFT FOOT: Primary | ICD-10-CM

## 2025-03-25 DIAGNOSIS — Z11.3 SCREEN FOR STD (SEXUALLY TRANSMITTED DISEASE): ICD-10-CM

## 2025-03-25 DIAGNOSIS — Z01.411 ENCOUNTER FOR GYNECOLOGICAL EXAMINATION (GENERAL) (ROUTINE) WITH ABNORMAL FINDINGS: Primary | ICD-10-CM

## 2025-03-25 DIAGNOSIS — B00.9 RECURRENT HSV (HERPES SIMPLEX VIRUS): ICD-10-CM

## 2025-03-25 DIAGNOSIS — E83.52 HYPERCALCEMIA: ICD-10-CM

## 2025-03-25 DIAGNOSIS — Z12.4 SCREENING FOR CERVICAL CANCER: ICD-10-CM

## 2025-03-25 PROBLEM — L03.116 CELLULITIS OF LEFT LOWER LEG: Status: RESOLVED | Noted: 2025-03-21 | Resolved: 2025-03-25

## 2025-03-25 PROBLEM — L03.119 CELLULITIS OF FOOT: Status: RESOLVED | Noted: 2025-03-14 | Resolved: 2025-03-25

## 2025-03-25 PROBLEM — K80.10 CALCULUS OF GALLBLADDER WITH CHRONIC CHOLECYSTITIS WITHOUT OBSTRUCTION: Status: RESOLVED | Noted: 2024-02-07 | Resolved: 2025-03-25

## 2025-03-25 PROCEDURE — 87491 CHLMYD TRACH DNA AMP PROBE: CPT | Performed by: PHYSICIAN ASSISTANT

## 2025-03-25 PROCEDURE — 99386 PREV VISIT NEW AGE 40-64: CPT | Performed by: PHYSICIAN ASSISTANT

## 2025-03-25 PROCEDURE — 87480 CANDIDA DNA DIR PROBE: CPT | Performed by: PHYSICIAN ASSISTANT

## 2025-03-25 PROCEDURE — 99495 TRANSJ CARE MGMT MOD F2F 14D: CPT | Performed by: INTERNAL MEDICINE

## 2025-03-25 PROCEDURE — 87591 N.GONORRHOEAE DNA AMP PROB: CPT | Performed by: PHYSICIAN ASSISTANT

## 2025-03-25 PROCEDURE — A9500 TC99M SESTAMIBI: HCPCS

## 2025-03-25 PROCEDURE — G0476 HPV COMBO ASSAY CA SCREEN: HCPCS | Performed by: PHYSICIAN ASSISTANT

## 2025-03-25 PROCEDURE — 78072 PARATHYRD PLANAR W/SPECT&CT: CPT

## 2025-03-25 PROCEDURE — G0145 SCR C/V CYTO,THINLAYER,RESCR: HCPCS | Performed by: PHYSICIAN ASSISTANT

## 2025-03-25 PROCEDURE — 87660 TRICHOMONAS VAGIN DIR PROBE: CPT | Performed by: PHYSICIAN ASSISTANT

## 2025-03-25 PROCEDURE — 87510 GARDNER VAG DNA DIR PROBE: CPT | Performed by: PHYSICIAN ASSISTANT

## 2025-03-25 RX ORDER — VALACYCLOVIR HYDROCHLORIDE 500 MG/1
500 TABLET, FILM COATED ORAL 2 TIMES DAILY
Qty: 60 TABLET | Refills: 2 | Status: SHIPPED | OUTPATIENT
Start: 2025-03-25 | End: 2025-04-24

## 2025-03-25 RX ORDER — FOLIC ACID 0.4 MG
400 TABLET ORAL DAILY
Start: 2025-03-25

## 2025-03-25 RX ORDER — ERGOCALCIFEROL 1.25 MG/1
50000 CAPSULE, LIQUID FILLED ORAL WEEKLY
Qty: 4 CAPSULE | Refills: 0 | Status: SHIPPED | OUTPATIENT
Start: 2025-03-25

## 2025-03-25 RX ORDER — PREDNISONE 10 MG/1
TABLET ORAL
Qty: 27 TABLET | Refills: 0 | Status: SHIPPED | OUTPATIENT
Start: 2025-03-25 | End: 2025-04-04

## 2025-03-25 NOTE — LETTER
March 25, 2025     Patient: Michelle Costa  YOB: 1980  Date of Visit: 3/25/2025      To Whom it May Concern:    Michelle Costa is under my professional care. Michelle was seen in my office on 3/25/2025.     If you have any questions or concerns, please don't hesitate to call.         Sincerely,          Lea Reyes, MD        CC: No Recipients

## 2025-03-25 NOTE — PATIENT INSTRUCTIONS
"Patient Education     Low-purine diet   The Basics   Written by the doctors and editors at [facility]   Why do I need a low-purine diet? -- Purines are natural substances found in some foods. When the body digests purines, it makes a waste product called \"uric acid.\"  When too much uric acid builds up in the body, it can lead to health problems. Doctors might recommend following a low-purine diet as part of your treatment if you have:   Uric acid stones - These are a type of kidney stone.   Gout - Gout is a form of arthritis. It can cause attacks of pain and swelling in the joints.  Limiting foods high in purines is only 1 part of a treatment plan. These conditions are also treated with medicines. For people with gout, diet changes alone will probably not help much with symptoms. But doctors do usually recommend avoiding alcohol and any other foods that cause attacks.  What can I eat and drink on a low-purine diet?    Grains - Popcorn, whole-grain breads, pastas, cereals, rice.   Fruits - All fruits.   Vegetables - All vegetables, except those that are moderate and high in purines. Vegetables to avoid or limit are listed below.   Dairy - All types of dairy are low in purines. But low-fat or fat-free milk, yogurt, or cheeses are the best for you.   Meats, poultry, seafood, and proteins - Eggs, nuts, peanut butter.   Other foods and drinks - Water, tea, coffee, cocoa, condiments like salt, herbs, olives, pickles, relishes, vinegar, olive oil, sugar, sweets, gelatin.  What foods and drinks should I avoid or limit on a low-purine diet?    Grains to avoid or limit - Oatmeal, wheat bran, wheat germ.   Vegetables to avoid or limit - Asparagus, spinach, cauliflower, green peas, mushrooms.   Meats, poultry, seafood, and proteins to avoid or limit - Red meat (beef, veal, lamb, pork), poultry, mims, organ meats (sweetbreads, liver, kidneys, heart, brains), fish (mackerel, sardines, anchovies, herring, trout, concepcion, cod, " tuna), seafood (scallops, mussels, shrimp, lobster, crab, oysters), tripe, wild game (goose, duck, venison), mincemeat, lunch meats, turkey, dried beans, peas, lentils.   Other foods and drinks to avoid or limit - Gravies, meat sauces, alcohol, yeast and yeast extracts (taken as supplements). Meat- or fish-based soups, broths, and bouillons.  What else should I know? -- If you have excess body weight and gout, losing weight can help with symptoms. Drinking plenty of water can also help with problems caused by too much uric acid in your body.  All topics are updated as new evidence becomes available and our peer review process is complete.  This topic retrieved from [employer] on: Apr 04, 2024.  Topic 399441 Version 1.0  Release: 32.2.4 - C32.93  © 2024 UpToDate, Inc. and/or its affiliates. All rights reserved.  Consumer Information Use and Disclaimer   Disclaimer: This generalized information is a limited summary of diagnosis, treatment, and/or medication information. It is not meant to be comprehensive and should be used as a tool to help the user understand and/or assess potential diagnostic and treatment options. It does NOT include all information about conditions, treatments, medications, side effects, or risks that may apply to a specific patient. It is not intended to be medical advice or a substitute for the medical advice, diagnosis, or treatment of a health care provider based on the health care provider's examination and assessment of a patient's specific and unique circumstances. Patients must speak with a health care provider for complete information about their health, medical questions, and treatment options, including any risks or benefits regarding use of medications. This information does not endorse any treatments or medications as safe, effective, or approved for treating a specific patient. UpToDate, Inc. and its affiliates disclaim any warranty or liability relating to this information or the use  thereof.The use of this information is governed by the Terms of Use, available at https://www.wolterskluwer.com/en/know/clinical-effectiveness-terms. 2024© UpToDate, Inc. and its affiliates and/or licensors. All rights reserved.  Copyright   © 2024 [employer] and/or its affiliates. All rights reserved.

## 2025-03-25 NOTE — PROGRESS NOTES
ASSESSMENT & PLAN: Michelle Costa is a 44 y.o.  with abnormal gynecologic exam.    1.  Routine well woman exam done today  2.  Pap and HPV:  The patient's last pap and hpv was  (pos HPV, normal PAP).    It was abnormal.    Pap and cotesting was not done today.    Current ASCCP Guidelines reviewed.   3.  Mammogram ordered by PCP and scheduled.   Colonoscopy screen for colon cancer again discussed and encouraged - pt previously referred to GI by her PCP  4. The following were reviewed in today's visit: breast self exam, menopause, exercise, healthy diet, tobacco cessation, and age appropriate recommendations regarding screenings and prevention.  5.  Patient complaining of some irritation of the labia/vulva intermittently.  She does note occasional bumps in her suprapubic region that she is concern for recurrent HSV however based on description does not seem as though it is HSV breakout.  She is concerned as Valtrex was removed from her med list from her recent ER visit.  Patient desired to take Valtrex for suppression which was prescribed for her today.  I cannot appreciate any distinct rash or lesions consistent with HSV outbreak today.  I do question possible folliculitis or inflamed hair follicles by her description however again no abnormality seen today.  Patient will monitor.  Vulva inspected without any visible erythema, excoriation, lesions, rash, asymmetry or abnormal pigment.  There is large amount of discharge noted at vaginal introitus and within vaginal canal concerning for possible infection.  Vaginosis probe, GC CT screening obtained today.  Patient agreeable to await results to determine recommendations for management of symptoms.    RTO 1 year annual exam, sooner problems arise in the interim or appropriate follow-up as needed for current problems.  Patient encouraged to continue close management with specialist and PCP for her medical conditions including endocrine for her hypercalcemia and  parathyroid conditions.    CC:  Annual Gynecologic Examination    HPI: Michelle Costa is a 44 y.o.  who presents for annual gynecologic examination.    Former patient at [facility] last seen .    She has the following concerns:  states hx of genital HSV with infrequent outbreaks. Previously prescribed valtrex taken prn.  Concerned for more frequent outbreaks.   She has been getting irritation mainly external within the pubic hairs and on the lips.  She notes pimple type lesions within pubic hair, diffuse rather than in a cluster.  She had been on valtrex previously prn, but states was in hospital and they removed medication from list.    She currently feels some irritation externally more so on labia and vaginal opening today, no abnormal discharge, odor or intravaginal sxs.   She had been using hydrocortisone which is not helping.    She is following specialists for primary hyperparathyroidism and parathyroid adenoma, vit D and B12 def.    Healthy diet No; drinks mainly soda  Vitamins Yes; vit B12  Exercise No    Patient's last menstrual period was 03/10/2025 (approximate).    Menses frequency: regular once a month  Length of bleedin days  Bleeding quality: heavy first few days then tapers.   Pain with menses: moderate cramping first 2-3 days. Taking motrin or tylenol.     Last Mammogram:   Last Colon Cancer Screening: never had      Health Maintenance:      She does not perform regular monthly self breast exams.  Denies breast pain, lump, skin changes or nipple discharge.    She feels safe at home. Feels safe in relationship with her partner.     Patient Active Problem List   Diagnosis    Herpes simplex type 2 infection    Anxiety    Class 2 obesity due to excess calories without serious comorbidity with body mass index (BMI) of 35.0 to 35.9 in adult    Tinnitus of left ear    Annual physical exam    Smoking    Encounter for smoking cessation counseling    Hyperparathyroidism (HCC)    Vitamin B12  deficiency    Vitamin D deficiency    Recurrent HSV (herpes simplex virus)       Past Medical History:   Diagnosis Date    Acute non-recurrent maxillary sinusitis 2020    Allergic     Seasonal    Anxiety     Bronchitis     Calculus of gallbladder with chronic cholecystitis without obstruction 2024    Cellulitis of foot 2025    Cellulitis of left lower leg 2025    Disease of thyroid gland     Encounter for consultation 2020    Exposure to COVID-19 virus 2020    GERD (gastroesophageal reflux disease)     Kidney stone     Ovarian cyst     Pancreatitis     Psychiatric disorder     Upper respiratory tract infection 10/18/2019       Past Surgical History:   Procedure Laterality Date    APPENDECTOMY      CHOLECYSTECTOMY  24    GALLBLADDER SURGERY  2024    removal    HI LAPAROSCOPY SURG CHOLECYSTECTOMY N/A 2024    Procedure: CHOLECYSTECTOMY LAPAROSCOPIC;  Surgeon: Angel Mera DO;  Location: [facility] MAIN OR;  Service: General    TUBAL LIGATION         Past OB/Gyn History:  OB History          4    Para   4    Term   3       1    AB        Living             SAB        IAB        Ectopic        Multiple        Live Births                      Pt does not have menstrual issues. .  History of abnormal pap smears: Yes pos HPV, CELINA 1.    Patient is currently sexually active.  Monogamous with partner. The current method of family planning is tubal ligation.    Family History   Problem Relation Age of Onset    Depression Family     Diabetes Family     Hypertension Family     Schizophrenia Family     No Known Problems Mother     Alcohol abuse Father     Substance Abuse Father     Mental illness Father     Depression Father     Diabetes Father     Schizoaffective Disorder  Father     No Known Problems Sister     No Known Problems Daughter     No Known Problems Maternal Grandmother     No Known Problems Maternal Grandfather     No Known Problems Paternal  Grandmother     No Known Problems Paternal Grandfather     No Known Problems Son     No Known Problems Son     No Known Problems Son     No Known Problems Paternal Aunt        Family History of Breast/ Uterine/Ovarian/Colon Cancer:  denies    Social History:  Social History     Socioeconomic History    Marital status: /Civil Union     Spouse name: Not on file    Number of children: Not on file    Years of education: Not on file    Highest education level: Not on file   Occupational History    Not on file   Tobacco Use    Smoking status: Every Day     Current packs/day: 0.50     Average packs/day: 0.7 packs/day for 33.0 years (22.0 ttl pk-yrs)     Types: Cigarettes     Passive exposure: Current    Smokeless tobacco: Never   Vaping Use    Vaping status: Never Used   Substance and Sexual Activity    Alcohol use: Yes     Comment: occassional    Drug use: No    Sexual activity: Not Currently     Partners: Male     Birth control/protection: Female Sterilization   Other Topics Concern    Not on file   Social History Narrative    Not on file     Social Drivers of Health     Financial Resource Strain: Low Risk  (9/27/2023)    [facility]    Overall Financial Resource Strain (CARDIA)     Difficulty of Paying Living Expenses: Not very hard   Food Insecurity: No Food Insecurity (3/21/2025)    Nursing - Inadequate Food Risk Classification     Worried About Running Out of Food in the Last Year: Not on file     Ran Out of Food in the Last Year: Not on file     Ran Out of Food in the Last Year: Never true   Transportation Needs: No Transportation Needs (3/21/2025)    Nursing - Transportation Risk Classification     Lack of Transportation: Not on file     Lack of Transportation: No   Physical Activity: Not on file   Stress: Not on file   Social Connections: Not on file   Intimate Partner Violence: Unknown (3/21/2025)    Nursing IPS     Feels Physically and  "Emotionally Safe: Not on file     Physically Hurt by Someone: Not on file     Humiliated or Emotionally Abused by Someone: Not on file     Physically Hurt by Someone: No     Hurt or Threatened by Someone: No   Housing Stability: Unknown (3/21/2025)    Nursing: Inadequate Housing Risk Classification     Has Housing: Not on file     Worried About Losing Housing: Not on file     Unable to Get Utilities: Not on file     Unable to Pay for Housing in the Last Year: No     Has Housin     Allergies   Allergen Reactions    Mucinex Cough For Kids [Dextromethorphan-Guaifenesin] Other (See Comments)     Per verbal of pt \"It causes body aches\"    Other Allergic Rhinitis    Vancomycin Itching       Current Outpatient Medications:     cyanocobalamin ([employer] Vitamin B-12) 1000 MCG tablet, TAKE 1 TABLET BY MOUTH EVERY DAY, Disp: 30 tablet, Rfl: 5    predniSONE 10 mg tablet, Take 4 tablets (40 mg total) by mouth daily for 3 days, THEN 3 tablets (30 mg total) daily for 3 days, THEN 2 tablets (20 mg total) daily for 2 days, THEN 1 tablet (10 mg total) daily for 2 days., Disp: 27 tablet, Rfl: 0    triamcinolone (KENALOG) 0.1 % ointment, APPLY TWICE A DAY AS NEEDED FOR UP TO 2 WEEKS, Disp: , Rfl:     valACYclovir (VALTREX) 500 mg tablet, Take 1 tablet (500 mg total) by mouth 2 (two) times a day, Disp: 60 tablet, Rfl: 2    cholecalciferol (VITAMIN D3) 250 MCG (38889 UT) capsule, Take 5 capsules (50,000 Units total) by mouth once a week for 8 doses (Patient not taking: Reported on 3/25/2025), Disp: 20 capsule, Rfl: 1    ergocalciferol (VITAMIN D2) 50,000 units, Take 1 capsule (50,000 Units total) by mouth once a week, Disp: 4 capsule, Rfl: 0    folic acid (FOLVITE) 400 mcg tablet, Take 1 tablet (400 mcg total) by mouth daily, Disp: , Rfl:     nystatin (MYCOSTATIN) cream, Apply 1 Application topically 2 (two) times a day To affected area (Patient not taking: Reported on 3/25/2025), Disp: , Rfl:     Review of Systems:    Review of " "Systems  Constitutional :no fever, feels well, no tiredness, no recent weight gain or loss  ENT: no ear ache, no loss of hearing, no nosebleeds or nasal discharge, no sore throat or hoarseness.  Cardiovascular: ongoing left foot/lower leg edema, managed by vascular and PCP. Trial of prednisone. no complaints of slow or fast heart beat, no chest pain, no palpitations, no leg claudication. Respiratory: no complaints of shortness of shortness of breath, no [place]  Breasts:no complaints of breast pain, breast lump, or nipple discharge  Gastrointestinal: no complaints of abdominal pain, constipation, nausea, vomiting, or diarrhea or bloody stools  Genitourinary : vulvovaginal sxs as in HPI. no complaints of dysuria, incontinence, pelvic pain, no dysmenorrhea, vaginal discharge or abnormal vaginal bleeding and as noted in HPI.  Musculoskeletal: no complaints of arthralgia, no myalgia, no joint swelling or stiffness, no limb pain or swelling.  Integumentary: no complaints of skin rash or lesion, itching or dry skin  Neurological: no complaints of headache, no confusion, no numbness or tingling, no dizziness or fainting  Mental health: denies worsening anxiety, depression or SI    Objective      /90 (BP Location: Left arm, Patient Position: Sitting, Cuff Size: Adult)   Ht 5' 7\" (1.702 m)   Wt 104 kg (228 lb 12.8 oz)   LMP 03/10/2025 (Approximate)   BMI 35.84 kg/m²     General:   appears stated age, cooperative, alert normal mood and affect.  BMI 35.84   Neck: normal, supple,trachea midline, no masses   Heart: regular rate and rhythm, S1, S2 normal, no murmur, click, rub or gallop   Lungs: clear to auscultation bilaterally   Breasts: normal appearance, no masses or tenderness, No nipple retraction or dimpling, No nipple discharge or bleeding, No axillary or supraclavicular adenopathy, Normal to palpation without dominant masses   Abdomen: soft, non-tender, without masses or organomegaly   Vulva: normal female " genitalia, no lesions   Vagina: Increased amount of white thin discharge noted within vaginal canal without visible vaginal erythema, visible lesion or abnormal bleeding.   Urethra: normal   Cervix: Normal, no discharge. PAP done. Nontender.   Uterus: normal size, contour, position, consistency, mobility, non-tender   Adnexa: no mass, fullness, tenderness   Lymphatic palpation of lymph nodes in neck, axilla, groin and/or other locations: no lymphadenopathy or masses noted   Skin normal skin turgor and no rashes.   Psychiatric orientation to person, place, and time: normal. mood and affect: normal

## 2025-03-25 NOTE — PROGRESS NOTES
Transition of Care Visit  Name: Michelle Costa      : 1980      MRN: [id number]  Encounter Provider: Lea Reyes, MD  Encounter Date: 3/25/2025   [facility]    Assessment & Plan  Localized swelling of left foot  No response to Keflex , Augmentin. She received 1-2 days of doxycycline in the hospital without improvement. Observation off abx recommended. Venous doppler did not show a DVT. She has reflux in the GSV at the mid calf and unclear if this is causing the swelling that is mainly in the left foot. She will continue trying compression stockings. Uric acid is normal but can still have acute gout. Unable to take NSAIDs. I will have her try a prednisone taper.  Follow-up in a week  Orders:  •  predniSONE 10 mg tablet; Take 4 tablets (40 mg total) by mouth daily for 3 days, THEN 3 tablets (30 mg total) daily for 3 days, THEN 2 tablets (20 mg total) daily for 2 days, THEN 1 tablet (10 mg total) daily for 2 days.         History of Present Illness     Transitional Care Management Review:   Michelle Costa is a 44 y.o. female here for TCM follow up.     During the TCM phone call patient stated:  TCM Call (since 3/11/2025)     Date and time call was made  3/24/2025 10:22 AM    Hospital care reviewed  Records reviewed    Patient was hospitialized at  [facility]    Date of Admission  25    Date of discharge  25    Diagnosis  Cellulitis of left lower leg    Disposition  Home    Were the patients medications reviewed and updated  No      TCM Call (since 3/11/2025)     Scheduled for follow up?  Yes    Did you obtain your prescribed medications  Yes    Do you need help managing your prescriptions or medications  No    I have advised the patient to call PCP with any new or worsening symptoms  Taryn Pennington MR    Are you recieving home care services  No        > 3 weeks of pain swelling and redness on the left foot up to the mid shin; no DVT.  She was started on  "Keflex  Blister on the distal shin developed later  She saw vascular who recommended treatment for cellulitis   CT showed soft tissue swelling, no abscess  I placed her on Augmentin without improvement so she was sent to the ER.  She and placed initially on vancomycin but had itching.  She was then placed on doxycycline that did not help so was recommended to observe off antibiotics.  Venous doppler showed venous reflux and compression stockings recommended which she has tried to use but it was so tight and pain was worse.  No fever, chills              Review of Systems   Constitutional:  Negative for chills and fever.   Gastrointestinal:  Negative for abdominal pain, diarrhea, nausea and vomiting.     Objective   /70 (BP Location: Left arm, Patient Position: Sitting, Cuff Size: Large)   Pulse 90   Temp (!) 96.6 °F (35.9 °C) (Tympanic)   Ht 5' 7\" (1.702 m)   Wt 104 kg (229 lb)   LMP 02/16/2025 (Approximate)   SpO2 98%   BMI 35.87 kg/m²     Physical Exam  Constitutional:       Appearance: She is not ill-appearing.   Pulmonary:      Effort: No respiratory distress.   Musculoskeletal:      Left lower leg: Edema (Swelling of the foot up to the mid shin with mild erythema) present.   Psychiatric:         Mood and Affect: Mood normal.         Behavior: Behavior normal.       Medications have been reviewed by provider in current encounter      "

## 2025-03-26 ENCOUNTER — RESULTS FOLLOW-UP (OUTPATIENT)
Dept: OBGYN CLINIC | Facility: CLINIC | Age: 45
End: 2025-03-26

## 2025-03-26 ENCOUNTER — NURSE TRIAGE (OUTPATIENT)
Age: 45
End: 2025-03-26

## 2025-03-26 DIAGNOSIS — N76.0 BV (BACTERIAL VAGINOSIS): ICD-10-CM

## 2025-03-26 DIAGNOSIS — N76.0 BV (BACTERIAL VAGINOSIS): Primary | ICD-10-CM

## 2025-03-26 DIAGNOSIS — B96.89 BV (BACTERIAL VAGINOSIS): ICD-10-CM

## 2025-03-26 DIAGNOSIS — B96.89 BV (BACTERIAL VAGINOSIS): Primary | ICD-10-CM

## 2025-03-26 PROBLEM — E21.3 HYPERPARATHYROIDISM (HCC): Status: ACTIVE | Noted: 2023-09-30

## 2025-03-26 PROBLEM — F31.81 BIPOLAR 2 DISORDER, MAJOR DEPRESSIVE EPISODE (HCC): Status: RESOLVED | Noted: 2023-09-28 | Resolved: 2025-03-26

## 2025-03-26 PROBLEM — F31.81 BIPOLAR 2 DISORDER, MAJOR DEPRESSIVE EPISODE (HCC): Status: ACTIVE | Noted: 2023-09-28

## 2025-03-26 PROBLEM — E83.52 HYPERCALCEMIA: Status: ACTIVE | Noted: 2025-03-26

## 2025-03-26 PROBLEM — F39 MOOD DISORDER (HCC): Status: ACTIVE | Noted: 2023-09-27

## 2025-03-26 PROBLEM — F39 MOOD DISORDER (HCC): Status: RESOLVED | Noted: 2023-09-27 | Resolved: 2025-03-26

## 2025-03-26 PROBLEM — E21.3 HYPERPARATHYROIDISM (HCC): Status: RESOLVED | Noted: 2024-07-22 | Resolved: 2025-03-26

## 2025-03-26 PROBLEM — F41.9 ANXIETY: Status: RESOLVED | Noted: 2019-10-18 | Resolved: 2025-03-26

## 2025-03-26 LAB
BACTERIA BLD CULT: NORMAL
BACTERIA BLD CULT: NORMAL
C TRACH DNA SPEC QL NAA+PROBE: NEGATIVE
CANDIDA RRNA VAG QL PROBE: NOT DETECTED
G VAGINALIS RRNA GENITAL QL PROBE: DETECTED
HPV HR 12 DNA CVX QL NAA+PROBE: POSITIVE
HPV16 DNA CVX QL NAA+PROBE: NEGATIVE
HPV18 DNA CVX QL NAA+PROBE: NEGATIVE
N GONORRHOEA DNA SPEC QL NAA+PROBE: NEGATIVE
T VAGINALIS RRNA GENITAL QL PROBE: NOT DETECTED

## 2025-03-26 RX ORDER — CLINDAMYCIN PHOSPHATE 20 MG/G
1 CREAM VAGINAL
Qty: 40 G | Refills: 0 | Status: SHIPPED | OUTPATIENT
Start: 2025-03-26 | End: 2025-03-27 | Stop reason: SDUPTHER

## 2025-03-26 RX ORDER — CLINDAMYCIN PHOSPHATE 20 MG/G
1 CREAM VAGINAL
Qty: 40 G | Refills: 0 | Status: SHIPPED | OUTPATIENT
Start: 2025-03-26 | End: 2025-03-26 | Stop reason: SDUPTHER

## 2025-03-26 NOTE — TELEPHONE ENCOUNTER
"FOLLOW UP: Pharmacy updated, medication resent.    REASON FOR CONVERSATION: Medication Problem    SYMPTOMS: Using different pharmacy    OTHER: None    DISPOSITION: Order Prescription (overriding Home Care)      Reason for Disposition   Prescription prescribed recently is not at pharmacy and triager has access to patient's EMR and prescription is recorded in the EMR    Answer Assessment - Initial Assessment Questions  1. NAME of MEDICINE: \"What medicine(s) are you calling about?\"        clindamycin (CLEOCIN) 2 % vaginal cream     2. QUESTION: \"What is your question?\" (e.g., double dose of medicine, side effect)      Sent to wrong pharmacy  3. PRESCRIBER: \"Who prescribed the medicine?\" Reason: if prescribed by specialist, call should be referred to that group.      Ginna Martinez    Protocols used: Medication Question Call-Adult-[place]    "

## 2025-03-27 ENCOUNTER — TELEPHONE (OUTPATIENT)
Dept: ENDOCRINOLOGY | Facility: CLINIC | Age: 45
End: 2025-03-27

## 2025-03-27 ENCOUNTER — RESULTS FOLLOW-UP (OUTPATIENT)
Dept: ENDOCRINOLOGY | Facility: CLINIC | Age: 45
End: 2025-03-27

## 2025-03-27 DIAGNOSIS — B96.89 BV (BACTERIAL VAGINOSIS): ICD-10-CM

## 2025-03-27 DIAGNOSIS — E21.3 HYPERPARATHYROIDISM (HCC): ICD-10-CM

## 2025-03-27 DIAGNOSIS — D35.1 PARATHYROID ADENOMA: Primary | ICD-10-CM

## 2025-03-27 DIAGNOSIS — N76.0 BV (BACTERIAL VAGINOSIS): ICD-10-CM

## 2025-03-27 RX ORDER — CLINDAMYCIN PHOSPHATE 20 MG/G
1 CREAM VAGINAL
Qty: 40 G | Refills: 0 | Status: SHIPPED | OUTPATIENT
Start: 2025-03-27 | End: 2025-04-03

## 2025-03-27 NOTE — RESULT ENCOUNTER NOTE
Joey Costa   I have reivewed parathyroid scan report, it showed suspicion for a parathyroid adenoma .  I will put the referral for surgical oncology.  Please make appointment with surgical oncology as he will benefit from parathyroid adenoma surgery  Take care    Dr. Mckayla Vallecillo

## 2025-03-27 NOTE — TELEPHONE ENCOUNTER
Unclear why pharmacy did not receive prescription from yesterday - I reviewed E prescribing status and we did receive electronic confirmation from pharmacy 3/26/25 at 1123AM, but then script says D/C by one of the POD nurses in afternoon, so not sure what happened.   Please let pt know new script sent this AM to primary pharmacy on file in [facility] chart kameron winkler again.

## 2025-03-28 LAB
LAB AP GYN PRIMARY INTERPRETATION: NORMAL
Lab: NORMAL

## 2025-04-01 ENCOUNTER — OFFICE VISIT (OUTPATIENT)
Dept: INTERNAL MEDICINE CLINIC | Facility: CLINIC | Age: 45
End: 2025-04-01
Payer: COMMERCIAL

## 2025-04-01 VITALS
OXYGEN SATURATION: 100 % | HEART RATE: 88 BPM | SYSTOLIC BLOOD PRESSURE: 154 MMHG | WEIGHT: 229.6 LBS | BODY MASS INDEX: 35.96 KG/M2 | TEMPERATURE: 98.7 F | DIASTOLIC BLOOD PRESSURE: 88 MMHG

## 2025-04-01 DIAGNOSIS — E21.3 HYPERPARATHYROIDISM (HCC): ICD-10-CM

## 2025-04-01 DIAGNOSIS — R22.42 LOCALIZED SWELLING OF LEFT FOOT: Primary | ICD-10-CM

## 2025-04-01 DIAGNOSIS — I87.2 VENOUS INSUFFICIENCY OF LEFT LEG: ICD-10-CM

## 2025-04-01 DIAGNOSIS — R03.0 ELEVATED BLOOD PRESSURE READING IN OFFICE WITHOUT DIAGNOSIS OF HYPERTENSION: ICD-10-CM

## 2025-04-01 PROCEDURE — 99213 OFFICE O/P EST LOW 20 MIN: CPT | Performed by: INTERNAL MEDICINE

## 2025-04-01 NOTE — PROGRESS NOTES
Name: Michelle Costa      : 1980      MRN: [id number]  Encounter Provider: Lea Reyes, MD  Encounter Date: 2025   [facility]: [facility]    Assessment & Plan  Localized swelling of left foot  5 to 6 weeks of swelling in the left foot/ankle, started suddenly  Negative for DVT,+ reflux, no improvement from cephalexin followed by Augmentin  CT showed subcutaneous edema, no abscess  Continues to have swelling in the left foot/ankle, no significant improvement on prednisone  She will finish the rest of the steroid course  She has a follow-up with vascular surgery and I am referring her to podiatry  Continue using compression  Orders:  •  Ambulatory Referral to Podiatry; Future    Venous insufficiency of left leg  Continue using compression       Elevated blood pressure reading in office without diagnosis of hypertension  Blood pressure high today and recommended to start checking on her own  Low-salt diet       Hyperparathyroidism ([facility])  Appointment with surgery due to suspicious parathyroid adenoma            History of Present Illness     No significant improvement of left foot/ankle swelling on prednisone  It has been 5 to 6 weeks now left foot and ankle swelling  No fever, chills  She has a new pair of shoes she started using 3 days ago which seems to reduce the swelling because it is tighter but when she removes her shoes, the  foot are swollen  There is improvement of swelling when she has her legs elevated in which she wakes up in the morning but it gets worse as the day goes on      Review of Systems   Constitutional:  Negative for chills and fever.   Cardiovascular:  Positive for leg swelling.     Past Medical History:   Diagnosis Date   • Acute non-recurrent maxillary sinusitis 2020   • Allergic     Seasonal   • Anxiety    • Bipolar 2 disorder, major depressive episode ([facility]) 2023   • Bronchitis    • Calculus of gallbladder with chronic cholecystitis without  obstruction 02/07/2024   • Cellulitis of foot 03/14/2025   • Cellulitis of left lower leg 03/21/2025   • Cervical high risk human papillomavirus (HPV) DNA test positive 03/20/2015   • Cervical radiculopathy 08/08/2012   • Disease of thyroid gland 2023   • Dysplasia of cervix, low grade (CELINA 1) 03/11/2016    2014: ASCUS, HPV neg  2015: CELINA 1  2016: pap & HPV neg  2018: pap & HPV neg     • Encounter for consultation 08/18/2020   • Exposure to COVID-19 virus 11/24/2020   • External hemorrhoids 01/06/2014   • Gastroesophageal reflux disease 10/11/2012   • GERD (gastroesophageal reflux disease)    • Hemorrhagic cyst of ovary 08/21/2015   • Kidney stone    • Mood disorder (HCC) 09/27/2023   • Ovarian cyst    • Pancreatitis    • Psychiatric disorder    • Upper respiratory tract infection 10/18/2019     Past Surgical History:   Procedure Laterality Date   • APPENDECTOMY     • CHOLECYSTECTOMY  2/26/24   • GALLBLADDER SURGERY  02/2024    removal   • ND LAPAROSCOPY SURG CHOLECYSTECTOMY N/A 02/26/2024    Procedure: CHOLECYSTECTOMY LAPAROSCOPIC;  Surgeon: Angel Mera DO;  [facility];  Service: General   • TUBAL LIGATION       Family History   Problem Relation Age of Onset   • Depression Family    • Diabetes Family    • Hypertension Family    • Schizophrenia Family    • No Known Problems Mother    • Alcohol abuse Father    • Substance Abuse Father    • Mental illness Father    • Depression Father    • Diabetes Father    • Schizoaffective Disorder  Father    • No Known Problems Sister    • No Known Problems Daughter    • No Known Problems Maternal Grandmother    • No Known Problems Maternal Grandfather    • No Known Problems Paternal Grandmother    • No Known Problems Paternal Grandfather    • No Known Problems Son    • No Known Problems Son    • No Known Problems Son    • No Known Problems Paternal Aunt      Social History     Tobacco Use   • Smoking status: Every Day     Current packs/day: 0.50      "Average packs/day: 0.7 packs/day for 33.0 years (22.0 ttl pk-yrs)     Types: Cigarettes     Passive exposure: Current   • Smokeless tobacco: Never   Vaping Use   • Vaping status: Never Used   Substance and Sexual Activity   • Alcohol use: Yes     Comment: occassional   • Drug use: No   • Sexual activity: Not Currently     Partners: Male     Birth control/protection: Female Sterilization     Current Outpatient Medications on File Prior to Visit   Medication Sig   • clindamycin (CLEOCIN) 2 % vaginal cream Insert 1 applicator into the vagina daily at bedtime for 7 days   • cyanocobalamin ([employer] Vitamin B-12) 1000 MCG tablet TAKE 1 TABLET BY MOUTH EVERY DAY   • ergocalciferol (VITAMIN D2) 50,000 units Take 1 capsule (50,000 Units total) by mouth once a week   • folic acid (FOLVITE) 400 mcg tablet Take 1 tablet (400 mcg total) by mouth daily   • predniSONE 10 mg tablet Take 4 tablets (40 mg total) by mouth daily for 3 days, THEN 3 tablets (30 mg total) daily for 3 days, THEN 2 tablets (20 mg total) daily for 2 days, THEN 1 tablet (10 mg total) daily for 2 days.   • triamcinolone (KENALOG) 0.1 % ointment APPLY TWICE A DAY AS NEEDED FOR UP TO 2 WEEKS   • valACYclovir (VALTREX) 500 mg tablet Take 1 tablet (500 mg total) by mouth 2 (two) times a day     Allergies   Allergen Reactions   • Mucinex Cough For Kids [Dextromethorphan-Guaifenesin] Other (See Comments)     Per verbal of pt \"It causes body aches\"   • Other Allergic Rhinitis   • Vancomycin Itching     Immunization History   Administered Date(s) Administered   • COVID-19 PFIZER VACCINE 0.3 ML IM 04/12/2021, 05/08/2021   • Tdap 06/03/2020   • Tuberculin Skin Test-PPD Intradermal 03/18/2013, 06/03/2020     Objective   /88 (BP Location: Left arm, Patient Position: Standing, Cuff Size: Standard)   Pulse 88   Temp 98.7 °F (37.1 °C) (Tympanic)   Wt 104 kg (229 lb 9.6 oz)   LMP 03/10/2025 (Approximate)   SpO2 100%   BMI 35.96 kg/m²     Physical Exam  Constitutional: "       General: She is not in acute distress.     Appearance: She is well-developed. She is not ill-appearing, toxic-appearing or diaphoretic.   Eyes:      Conjunctiva/sclera: Conjunctivae normal.   Pulmonary:      Effort: No respiratory distress.   Musculoskeletal:      Right lower leg: No edema.      Left lower leg: Edema (Swelling to left foot/ankle) present.   Neurological:      Mental Status: She is alert and oriented to person, place, and time.   Psychiatric:         Mood and Affect: Mood normal.         Behavior: Behavior normal.         Thought Content: Thought content normal.         Judgment: Judgment normal.

## 2025-04-11 ENCOUNTER — HOSPITAL ENCOUNTER (OUTPATIENT)
Dept: ULTRASOUND IMAGING | Facility: HOSPITAL | Age: 45
End: 2025-04-11
Attending: SURGERY
Payer: COMMERCIAL

## 2025-04-11 ENCOUNTER — HOSPITAL ENCOUNTER (OUTPATIENT)
Dept: CT IMAGING | Facility: HOSPITAL | Age: 45
End: 2025-04-11
Attending: SURGERY
Payer: COMMERCIAL

## 2025-04-11 ENCOUNTER — OFFICE VISIT (OUTPATIENT)
Dept: SURGICAL ONCOLOGY | Facility: CLINIC | Age: 45
End: 2025-04-11
Payer: COMMERCIAL

## 2025-04-11 VITALS
BODY MASS INDEX: 35.94 KG/M2 | WEIGHT: 229 LBS | DIASTOLIC BLOOD PRESSURE: 84 MMHG | SYSTOLIC BLOOD PRESSURE: 118 MMHG | OXYGEN SATURATION: 100 % | HEIGHT: 67 IN | HEART RATE: 87 BPM

## 2025-04-11 DIAGNOSIS — D35.1 PARATHYROID ADENOMA: ICD-10-CM

## 2025-04-11 DIAGNOSIS — E21.3 HYPERPARATHYROIDISM (HCC): ICD-10-CM

## 2025-04-11 PROCEDURE — 76536 US EXAM OF HEAD AND NECK: CPT

## 2025-04-11 PROCEDURE — 99205 OFFICE O/P NEW HI 60 MIN: CPT | Performed by: SURGERY

## 2025-04-11 PROCEDURE — 70492 CT SFT TSUE NCK W/O & W/DYE: CPT

## 2025-04-11 RX ADMIN — IOHEXOL 75 ML: 350 INJECTION, SOLUTION INTRAVENOUS at 17:27

## 2025-04-11 NOTE — PROGRESS NOTES
Name: Michelle Costa      : 1980      MRN: [id number]  Encounter Provider: Becky Blanton MD  Encounter Date: 2025   Encounter department: [facility]  :  Assessment & Plan  Hyperparathyroidism (HCC)    Orders:    Ambulatory Referral to Surgical Oncology    US thyroid; Future    CT parathyroid study w wo contrast; Future    Parathyroid adenoma    Orders:    Ambulatory Referral to Surgical Oncology    US thyroid; Future    CT parathyroid study w wo contrast; Future  Patient has been referred for surgical oncology consultation by endocrinologist with primary hyperparathyroidism symptomatic  This is a 44-year-old female with longstanding hypercalcemia and hyperparathyroidism.  Patient had a nuclear scan which demonstrated possible enlarged single parathyroid adenoma.  Patient has been referred to us for further workup and management.  Her most recent parathyroid hormone is 250 mcg with a calcium of 10.9.  Nuclear scan demonstrated radiotracer uptake immediately inferior to the left thyroid lobe likely parathyroid adenoma.  We did review and discuss pathogenesis of parathyroid adenoma and further workup and management.  Most likely she will need surgery.  In the meantime we will also obtain CT parathyroid as a confirmatory study.  Patient was instructed to keep well-hydrated.  We will see her within 2 weeks with CT parathyroid studies.    History of Present Illness   Chief Complaint   Patient presents with    Consult     NP-Parathyroid adenoma     No follow-ups on file.    Pertinent Medical History   Hyperparathyroidism possibly primary           Review of Systems   Constitutional:  Negative for chills and fever.   HENT:  Negative for ear pain and sore throat.    Eyes:  Negative for pain and visual disturbance.   Respiratory:  Negative for cough and shortness of breath.    Cardiovascular:  Negative for chest pain and palpitations.   Gastrointestinal:  Negative  "for abdominal pain and vomiting.   Genitourinary:  Negative for dysuria and hematuria.   Musculoskeletal:  Negative for arthralgias and back pain.   Skin:  Negative for color change and rash.   Neurological:  Negative for seizures and syncope.   All other systems reviewed and are negative.   A complete review of systems is negative other than that noted above in the HPI.    Medical History Reviewed by provider this encounter:  Tobacco  Allergies  Meds  Problems  Med Hx  Surg Hx  Fam Hx     .     Current Outpatient Medications   Medication Sig Dispense Refill    cyanocobalamin ([employer] Vitamin B-12) 1000 MCG tablet TAKE 1 TABLET BY MOUTH EVERY DAY 30 tablet 5    ergocalciferol (VITAMIN D2) 50,000 units Take 1 capsule (50,000 Units total) by mouth once a week 4 capsule 0    folic acid (FOLVITE) 400 mcg tablet Take 1 tablet (400 mcg total) by mouth daily      triamcinolone (KENALOG) 0.1 % ointment APPLY TWICE A DAY AS NEEDED FOR UP TO 2 WEEKS      valACYclovir (VALTREX) 500 mg tablet Take 1 tablet (500 mg total) by mouth 2 (two) times a day 60 tablet 2     No current facility-administered medications for this visit.      Objective   /84 (BP Location: Right arm, Patient Position: Sitting)   Pulse 87   Ht 5' 7\" (1.702 m)   Wt 104 kg (229 lb)   LMP 03/10/2025 (Approximate)   SpO2 100%   BMI 35.87 kg/m²     Pain Screening:     ECOG    Physical Exam  Constitutional:       Appearance: Normal appearance.   HENT:      Head: Normocephalic and atraumatic.      Nose: Nose normal.      Mouth/Throat:      Mouth: Mucous membranes are moist.   Eyes:      Pupils: Pupils are equal, round, and reactive to light.   Neck:      Comments: Neck is supple trachea is midline no palpable mass or masses.  No adenopathy.  No JVD.  Cardiovascular:      Rate and Rhythm: Normal rate.      Pulses: Normal pulses.      Heart sounds: Normal heart sounds.   Pulmonary:      Effort: Pulmonary effort is normal.      Breath sounds: Normal " breath sounds.   Abdominal:      General: Bowel sounds are normal.      Palpations: Abdomen is soft.   Musculoskeletal:         General: Normal range of motion.      Cervical back: Normal range of motion and neck supple.   Skin:     General: Skin is warm.   Neurological:      General: No focal deficit present.      Mental Status: She is alert and oriented to person, place, and time.   Psychiatric:         Mood and Affect: Mood normal.         Behavior: Behavior normal.         Thought Content: Thought content normal.         Judgment: Judgment normal.        Constitutional: General appearance: The Patient is well-developed and well-nourished who appears the stated age in no acute distress. Patient is pleasant and talkative.  HEENT: Normocephalic. Sclerae are anicteric. Mucous membranes are moist. Neck is supple without adenopathy. No JVD.  Chest: The lungs are clear to auscultation.  Cardiac: Heart is regular rate.  Abdomen: Abdomen is soft, non-tender, non-distended and without masses.  Extremities: There is no clubbing or cyanosis. There is no edema. Symmetric.  Neuro: Grossly nonfocal. Gait is normal.  Lymphatic: No evidence of cervical adenopathy bilaterally.  No evidence of axillary adenopathy bilaterally. No evidence of inguinal adenopathy bilaterally.  Skin: Warm, anicteric.  Psych: Patient is pleasant and talkative    Labs: I have reviewed pertinent labs.   Lab Results   Component Value Date/Time    WBC 3.49 (L) 03/22/2025 04:21 AM    RBC 4.29 03/22/2025 04:21 AM    Hemoglobin 11.3 (L) 03/22/2025 04:21 AM    Hematocrit 35.4 03/22/2025 04:21 AM    MCV 83 03/22/2025 04:21 AM    MCH 26.3 (L) 03/22/2025 04:21 AM    RDW 15.6 (H) 03/22/2025 04:21 AM    Platelets 164 03/22/2025 04:21 AM    Segmented % 48 03/21/2025 06:06 AM    Lymphocytes % 37 03/21/2025 06:06 AM    Monocytes % 10 03/21/2025 06:06 AM    Eosinophils Relative 4 03/21/2025 06:06 AM    Basophils Relative 1 03/21/2025 06:06 AM    Immature Grans % 0  03/21/2025 06:06 AM    Absolute Neutrophils 1.87 03/21/2025 06:06 AM      Lab Results   Component Value Date/Time    Sodium 141 03/22/2025 04:20 AM    Potassium 3.9 03/22/2025 04:20 AM    Chloride 115 (H) 03/22/2025 04:20 AM    CO2 24 03/22/2025 04:20 AM    ANION GAP 2 (L) 03/22/2025 04:20 AM    BUN 9 03/22/2025 04:20 AM    Creatinine 0.57 (L) 03/22/2025 04:20 AM    Glucose 100 03/22/2025 04:20 AM    Glucose, Fasting 88 09/09/2024 08:39 AM    Calcium 10.3 (H) 03/22/2025 04:20 AM    AST 13 03/20/2025 11:18 PM    ALT 17 03/20/2025 11:18 PM    Alkaline Phosphatase 129 (H) 03/20/2025 11:18 PM    Total Protein 7.0 03/20/2025 11:18 PM    Albumin 4.2 03/20/2025 11:18 PM    Total Bilirubin 0.36 03/20/2025 11:18 PM    eGFR 111 03/22/2025 04:20 AM      Results for orders placed or performed in visit on 03/25/25   Chlamydia/GC amplified DNA by PCR    Specimen: Cervix; Genital   Result Value Ref Range    N gonorrhoeae, DNA Probe Negative Negative    Chlamydia trachomatis, DNA Probe Negative Negative   Vaginosis DNA Probe    Specimen: Vaginal; Genital   Result Value Ref Range    Trichomonas vaginalis Not Detected Not Detected    Gardnerella vaginalis Detected (A) Not Detected    Candida Species Not Detected Not Detected   HPV High Risk    Specimen: Cervix; Thin-Prep Vial   Result Value Ref Range    HPV Other HR Positive (A) Negative    HPV16 Negative Negative    HPV18 Negative Negative   Liquid-based pap, screening   Result Value Ref Range    Case Report       Gynecologic Cytology Report                       Case: [id number]                                  Authorizing Provider:  Ginna Martinez PA-C    Collected:           03/25/2025 1626              Ordering Location:     [facility] [place]    Received:            03/25/2025 1626                                     [facility]                                                             First Screen:          Trinh Garcia, CT                                                        Specimen:    LIQUID-BASED PAP, SCREENING, Cervix                                                        Primary Interpretation Negative for intraepithelial lesion or malignancy     Specimen Adequacy       Satisfactory for evaluation. Endocervical/transformation zone component present.    Note       Screening performed at [facility], [address] [facility] [address].        Additional Information       [employer] FDA approved ,  and ThinPrep Imaging Duo System are utilized with strict adherence to the 's instruction manual to prepare gynecologic and non-gynecologic cytology specimens for the production of ThinPrep slides as well as for gynecologic ThinPrep imaging. These processes have been validated by our laboratory and/or by the .  The Pap test is not a diagnostic procedure and should not be used as the sole means to detect cervical cancer. It is only a screening procedure to aid in the detection of cervical cancer and its precursors. Both false-negative and false-positive results have been experienced. Your patient's test result should be interpreted in this context together with the history and clinical findings.      Gross Description       A. 20 ml , colorless, cloudy received in a ThinPrep vial.          Radiology Results Review: I personally reviewed the following image studies in PACS and associated radiology reports: Parathyroid sestamibi scan. My interpretation of the radiology images/reports is: Possible single left inferior parathyroid adenoma will obtain CT parathyroid to confirm.      Administrative Statements   I have spent a total time of 65 minutes in caring for this patient on the day of the visit/encounter including Diagnostic results, Prognosis, Risks and benefits of tx options, Instructions for management, Patient and family education, Importance of tx compliance, Risk factor reductions, Impressions, Documenting in the medical  record, Reviewing/placing orders in the medical record (including tests, medications, and/or procedures), and Obtaining or reviewing history  .

## 2025-04-11 NOTE — ASSESSMENT & PLAN NOTE
Orders:    Ambulatory Referral to Surgical Oncology    US thyroid; Future    CT parathyroid study w wo contrast; Future

## 2025-04-13 PROBLEM — I87.2 VENOUS INSUFFICIENCY: Status: ACTIVE | Noted: 2025-04-13

## 2025-04-20 DIAGNOSIS — E55.9 VITAMIN D DEFICIENCY: ICD-10-CM

## 2025-04-21 RX ORDER — ERGOCALCIFEROL 1.25 MG/1
50000 CAPSULE, LIQUID FILLED ORAL WEEKLY
Qty: 4 CAPSULE | Refills: 0 | OUTPATIENT
Start: 2025-04-21

## 2025-04-25 ENCOUNTER — TELEPHONE (OUTPATIENT)
Dept: SURGICAL ONCOLOGY | Facility: CLINIC | Age: 45
End: 2025-04-25

## 2025-05-23 ENCOUNTER — TELEPHONE (OUTPATIENT)
Dept: SURGICAL ONCOLOGY | Facility: CLINIC | Age: 45
End: 2025-05-23

## 2025-05-23 NOTE — TELEPHONE ENCOUNTER
Called patient and spoke to Michelle, she would like to reschedule her appointment with Dr. Blanton but was requesting a virtual visit as she can not take off work at this time due to starting a new job. I reached out to Dr. Blanton's nurse Yulisa to see if a virtual would be allowed. I told patient the unfortunately, it would need to be an in person appointment as it would be to discuss possible surgery. Patient stated she will need to call back to reschedule as she is unable to take off work at this time. I expressed understanding and did let her know that we do appointments as late as 3:45 if that would help her. She was thankful for the call.

## 2025-06-30 ENCOUNTER — PATIENT OUTREACH (OUTPATIENT)
Facility: HOSPITAL | Age: 45
End: 2025-06-30

## 2025-06-30 NOTE — PROGRESS NOTES
"Program details reviewed (Yes/No):Yes    Patient lives in [place]: (Yes/No):Yes    Uninsured/Underinsured(List):Uninsured    Patient Agreeable to Participation(Yes/No):Yes    Verbal Consent Given (Yes/No):Yes    [employer] Consent form signed \"verba consent\"(Yes/No):Yes    Patient Entered into [facility] (Yes/No):Yes     "

## 2025-08-19 ENCOUNTER — APPOINTMENT (EMERGENCY)
Dept: RADIOLOGY | Facility: HOSPITAL | Age: 45
End: 2025-08-19

## 2025-08-19 ENCOUNTER — HOSPITAL ENCOUNTER (EMERGENCY)
Facility: HOSPITAL | Age: 45
Discharge: HOME | End: 2025-08-20
Attending: EMERGENCY MEDICINE | Admitting: EMERGENCY MEDICINE

## 2025-08-19 VITALS
SYSTOLIC BLOOD PRESSURE: 166 MMHG | OXYGEN SATURATION: 100 % | DIASTOLIC BLOOD PRESSURE: 93 MMHG | HEART RATE: 103 BPM | RESPIRATION RATE: 18 BRPM | TEMPERATURE: 99.7 F

## 2025-08-19 DIAGNOSIS — J06.9 VIRAL URI WITH COUGH: Primary | ICD-10-CM

## 2025-08-19 LAB
ALBUMIN SERPL BCG-MCNC: 4.1 G/DL (ref 3.5–5)
ALP SERPL-CCNC: 119 U/L (ref 34–104)
ALT SERPL W P-5'-P-CCNC: 21 U/L (ref 7–52)
ANION GAP SERPL CALCULATED.3IONS-SCNC: 7 MMOL/L (ref 4–13)
AST SERPL W P-5'-P-CCNC: 16 U/L (ref 13–39)
BASOPHILS # BLD AUTO: 0.01 THOUSANDS/ÂΜL (ref 0–0.1)
BASOPHILS NFR BLD AUTO: 0 % (ref 0–1)
BILIRUB SERPL-MCNC: 0.37 MG/DL (ref 0.2–1)
BUN SERPL-MCNC: 7 MG/DL (ref 5–25)
CALCIUM SERPL-MCNC: 11.1 MG/DL (ref 8.4–10.2)
CARDIAC TROPONIN I PNL SERPL HS: <2 NG/L (ref ?–50)
CHLORIDE SERPL-SCNC: 111 MMOL/L (ref 96–108)
CO2 SERPL-SCNC: 23 MMOL/L (ref 21–32)
CREAT SERPL-MCNC: 0.77 MG/DL (ref 0.6–1.3)
EOSINOPHIL # BLD AUTO: 0.06 THOUSAND/ÂΜL (ref 0–0.61)
EOSINOPHIL NFR BLD AUTO: 1 % (ref 0–6)
ERYTHROCYTE [DISTWIDTH] IN BLOOD BY AUTOMATED COUNT: 15.9 % (ref 11.6–15.1)
FLUAV AG UPPER RESP QL IA.RAPID: NEGATIVE
FLUBV AG UPPER RESP QL IA.RAPID: NEGATIVE
GFR SERPL CREATININE-BSD FRML MDRD: 92 ML/MIN/1.73SQ M
GLUCOSE SERPL-MCNC: 98 MG/DL (ref 65–140)
HCT VFR BLD AUTO: 36.4 % (ref 34.8–46.1)
HGB BLD-MCNC: 11.8 G/DL (ref 11.5–15.4)
IMM GRANULOCYTES # BLD AUTO: 0.01 THOUSAND/UL (ref 0–0.2)
IMM GRANULOCYTES NFR BLD AUTO: 0 % (ref 0–2)
LIPASE SERPL-CCNC: 11 U/L (ref 11–82)
LYMPHOCYTES # BLD AUTO: 1.18 THOUSANDS/ÂΜL (ref 0.6–4.47)
LYMPHOCYTES NFR BLD AUTO: 20 % (ref 14–44)
MAGNESIUM SERPL-MCNC: 1.8 MG/DL (ref 1.9–2.7)
MCH RBC QN AUTO: 26.6 PG (ref 26.8–34.3)
MCHC RBC AUTO-ENTMCNC: 32.4 G/DL (ref 31.4–37.4)
MCV RBC AUTO: 82 FL (ref 82–98)
MONOCYTES # BLD AUTO: 0.52 THOUSAND/ÂΜL (ref 0.17–1.22)
MONOCYTES NFR BLD AUTO: 9 % (ref 4–12)
NEUTROPHILS # BLD AUTO: 4.23 THOUSANDS/ÂΜL (ref 1.85–7.62)
NEUTS SEG NFR BLD AUTO: 70 % (ref 43–75)
NRBC BLD AUTO-RTO: 0 /100 WBCS
PLATELET # BLD AUTO: 153 THOUSANDS/UL (ref 149–390)
PMV BLD AUTO: 12.6 FL (ref 8.9–12.7)
POTASSIUM SERPL-SCNC: 4 MMOL/L (ref 3.5–5.3)
PROT SERPL-MCNC: 6.5 G/DL (ref 6.4–8.4)
RBC # BLD AUTO: 4.43 MILLION/UL (ref 3.81–5.12)
SARS-COV+SARS-COV-2 AG RESP QL IA.RAPID: NEGATIVE
SODIUM SERPL-SCNC: 141 MMOL/L (ref 135–147)
WBC # BLD AUTO: 6.01 THOUSAND/UL (ref 4.31–10.16)

## 2025-08-19 PROCEDURE — 96375 TX/PRO/DX INJ NEW DRUG ADDON: CPT

## 2025-08-19 PROCEDURE — 85025 COMPLETE CBC W/AUTO DIFF WBC: CPT | Performed by: EMERGENCY MEDICINE

## 2025-08-19 PROCEDURE — 87804 INFLUENZA ASSAY W/OPTIC: CPT | Performed by: EMERGENCY MEDICINE

## 2025-08-19 PROCEDURE — 87811 SARS-COV-2 COVID19 W/OPTIC: CPT | Performed by: EMERGENCY MEDICINE

## 2025-08-19 PROCEDURE — 36415 COLL VENOUS BLD VENIPUNCTURE: CPT | Performed by: EMERGENCY MEDICINE

## 2025-08-19 PROCEDURE — 99284 EMERGENCY DEPT VISIT MOD MDM: CPT | Performed by: EMERGENCY MEDICINE

## 2025-08-19 PROCEDURE — 83735 ASSAY OF MAGNESIUM: CPT | Performed by: EMERGENCY MEDICINE

## 2025-08-19 PROCEDURE — 71045 X-RAY EXAM CHEST 1 VIEW: CPT

## 2025-08-19 PROCEDURE — 84484 ASSAY OF TROPONIN QUANT: CPT | Performed by: EMERGENCY MEDICINE

## 2025-08-19 PROCEDURE — 93005 ELECTROCARDIOGRAM TRACING: CPT

## 2025-08-19 PROCEDURE — 99284 EMERGENCY DEPT VISIT MOD MDM: CPT

## 2025-08-19 PROCEDURE — 83690 ASSAY OF LIPASE: CPT | Performed by: EMERGENCY MEDICINE

## 2025-08-19 PROCEDURE — 80053 COMPREHEN METABOLIC PANEL: CPT | Performed by: EMERGENCY MEDICINE

## 2025-08-19 PROCEDURE — 96365 THER/PROPH/DIAG IV INF INIT: CPT

## 2025-08-19 RX ORDER — ACETAMINOPHEN 10 MG/ML
1000 INJECTION, SOLUTION INTRAVENOUS ONCE
Status: COMPLETED | OUTPATIENT
Start: 2025-08-19 | End: 2025-08-20

## 2025-08-19 RX ORDER — ONDANSETRON 2 MG/ML
4 INJECTION INTRAMUSCULAR; INTRAVENOUS ONCE
Status: COMPLETED | OUTPATIENT
Start: 2025-08-19 | End: 2025-08-19

## 2025-08-19 RX ADMIN — ONDANSETRON 4 MG: 2 INJECTION INTRAMUSCULAR; INTRAVENOUS at 23:18

## 2025-08-19 RX ADMIN — ACETAMINOPHEN 1000 MG: 10 INJECTION, SOLUTION INTRAVENOUS at 23:18

## 2025-08-19 RX ADMIN — SODIUM CHLORIDE 1000 ML: 0.9 INJECTION, SOLUTION INTRAVENOUS at 23:18

## 2025-08-22 LAB
ATRIAL RATE: 122 BPM
P AXIS: 75 DEGREES
PR INTERVAL: 154 MS
QRS AXIS: 84 DEGREES
QRSD INTERVAL: 82 MS
QT INTERVAL: 300 MS
QTC INTERVAL: 427 MS
T WAVE AXIS: 56 DEGREES
VENTRICULAR RATE: 122 BPM

## 2025-08-22 PROCEDURE — 93010 ELECTROCARDIOGRAM REPORT: CPT | Performed by: INTERNAL MEDICINE

## (undated) DEVICE — SUT VICRYL 0 UR-6 27 IN J603H

## (undated) DEVICE — TROCAR: Brand: KII FIOS FIRST ENTRY

## (undated) DEVICE — TROCAR: Brand: KII® SLEEVE

## (undated) DEVICE — HARMONIC 1100 SHEARS, 36CM SHAFT LENGTH: Brand: HARMONIC

## (undated) DEVICE — INTENDED FOR TISSUE SEPARATION, AND OTHER PROCEDURES THAT REQUIRE A SHARP SURGICAL BLADE TO PUNCTURE OR CUT.: Brand: BARD-PARKER SAFETY BLADES SIZE 11, STERILE

## (undated) DEVICE — SUT MONOCRYL 4-0 PS-2 27 IN Y426H

## (undated) DEVICE — DECANTER: Brand: UNBRANDED

## (undated) DEVICE — PACK PBDS LAP CHOLE RF

## (undated) DEVICE — NEEDLE 23G X 1 1/2 SAFETY-GLIDE THIN WALL

## (undated) DEVICE — TUBING INSUFFLATION SET ISO CONNECTOR

## (undated) DEVICE — METZENBAUM ADTEC SINGLE USE DISSECTING SCISSORS, SHAFT ONLY, MONOPOLAR, CURVED TO LEFT, WORKING LENGTH: 12 1/4", (310 MM), DIAM. 5 MM, INSULATED, DOUBLE ACTION, STERILE, DISPOSABLE, PACKAGE OF 10 PIECES: Brand: AESCULAP

## (undated) DEVICE — TOWEL SURG XR DETECT GREEN STRL RFD

## (undated) DEVICE — GLOVE SRG BIOGEL ORTHOPEDIC 8

## (undated) DEVICE — ADHESIVE SKIN HIGH VISCOSITY EXOFIN 1ML

## (undated) DEVICE — LIGAMAX 5 MM ENDOSCOPIC MULTIPLE CLIP APPLIER: Brand: LIGAMAX